# Patient Record
Sex: FEMALE | Race: ASIAN | NOT HISPANIC OR LATINO | Employment: OTHER | URBAN - METROPOLITAN AREA
[De-identification: names, ages, dates, MRNs, and addresses within clinical notes are randomized per-mention and may not be internally consistent; named-entity substitution may affect disease eponyms.]

---

## 2024-03-02 ENCOUNTER — HOSPITAL ENCOUNTER (EMERGENCY)
Facility: CLINIC | Age: 29
Discharge: HOME | End: 2024-03-02
Attending: EMERGENCY MEDICINE | Admitting: EMERGENCY MEDICINE
Payer: COMMERCIAL

## 2024-03-02 VITALS
HEART RATE: 95 BPM | BODY MASS INDEX: 26.45 KG/M2 | RESPIRATION RATE: 16 BRPM | TEMPERATURE: 97.7 F | SYSTOLIC BLOOD PRESSURE: 101 MMHG | DIASTOLIC BLOOD PRESSURE: 53 MMHG | WEIGHT: 158.73 LBS | HEIGHT: 65 IN | OXYGEN SATURATION: 98 %

## 2024-03-02 DIAGNOSIS — R11.2 NAUSEA AND VOMITING, UNSPECIFIED VOMITING TYPE: ICD-10-CM

## 2024-03-02 DIAGNOSIS — R10.13 ABDOMINAL PAIN, EPIGASTRIC: ICD-10-CM

## 2024-03-02 LAB
ALBUMIN SERPL BCG-MCNC: 4.4 G/DL (ref 3.5–5.2)
ALBUMIN UR-MCNC: NEGATIVE MG/DL
ALP SERPL-CCNC: 71 U/L (ref 40–150)
ALT SERPL W P-5'-P-CCNC: 21 U/L (ref 0–50)
ANION GAP SERPL CALCULATED.3IONS-SCNC: 11 MMOL/L (ref 7–15)
APPEARANCE UR: ABNORMAL
AST SERPL W P-5'-P-CCNC: 23 U/L (ref 0–45)
BACTERIA #/AREA URNS HPF: ABNORMAL /HPF
BASOPHILS # BLD AUTO: 0 10E3/UL (ref 0–0.2)
BASOPHILS NFR BLD AUTO: 0 %
BILIRUB SERPL-MCNC: <0.2 MG/DL
BILIRUB UR QL STRIP: NEGATIVE
BUN SERPL-MCNC: 16.5 MG/DL (ref 6–20)
CALCIUM SERPL-MCNC: 9.5 MG/DL (ref 8.6–10)
CHLORIDE SERPL-SCNC: 101 MMOL/L (ref 98–107)
COLOR UR AUTO: ABNORMAL
CREAT SERPL-MCNC: 0.83 MG/DL (ref 0.51–0.95)
DEPRECATED HCO3 PLAS-SCNC: 25 MMOL/L (ref 22–29)
EGFRCR SERPLBLD CKD-EPI 2021: >90 ML/MIN/1.73M2
EOSINOPHIL # BLD AUTO: 0.1 10E3/UL (ref 0–0.7)
EOSINOPHIL NFR BLD AUTO: 1 %
ERYTHROCYTE [DISTWIDTH] IN BLOOD BY AUTOMATED COUNT: 13.3 % (ref 10–15)
GLUCOSE SERPL-MCNC: 88 MG/DL (ref 70–99)
GLUCOSE UR STRIP-MCNC: NEGATIVE MG/DL
HCG INTACT+B SERPL-ACNC: <1 MIU/ML
HCT VFR BLD AUTO: 38.6 % (ref 35–47)
HGB BLD-MCNC: 12.3 G/DL (ref 11.7–15.7)
HGB UR QL STRIP: NEGATIVE
IMM GRANULOCYTES # BLD: 0 10E3/UL
IMM GRANULOCYTES NFR BLD: 0 %
KETONES UR STRIP-MCNC: NEGATIVE MG/DL
LEUKOCYTE ESTERASE UR QL STRIP: ABNORMAL
LIPASE SERPL-CCNC: 37 U/L (ref 13–60)
LYMPHOCYTES # BLD AUTO: 3.4 10E3/UL (ref 0.8–5.3)
LYMPHOCYTES NFR BLD AUTO: 38 %
MCH RBC QN AUTO: 27.7 PG (ref 26.5–33)
MCHC RBC AUTO-ENTMCNC: 31.9 G/DL (ref 31.5–36.5)
MCV RBC AUTO: 87 FL (ref 78–100)
MONOCYTES # BLD AUTO: 0.4 10E3/UL (ref 0–1.3)
MONOCYTES NFR BLD AUTO: 5 %
MUCOUS THREADS #/AREA URNS LPF: PRESENT /LPF
NEUTROPHILS # BLD AUTO: 5 10E3/UL (ref 1.6–8.3)
NEUTROPHILS NFR BLD AUTO: 56 %
NITRATE UR QL: NEGATIVE
NRBC # BLD AUTO: 0 10E3/UL
NRBC BLD AUTO-RTO: 0 /100
PH UR STRIP: 7 [PH] (ref 5–7)
PLATELET # BLD AUTO: 248 10E3/UL (ref 150–450)
POTASSIUM SERPL-SCNC: 3.5 MMOL/L (ref 3.4–5.3)
PROT SERPL-MCNC: 7.7 G/DL (ref 6.4–8.3)
RBC # BLD AUTO: 4.44 10E6/UL (ref 3.8–5.2)
RBC URINE: 1 /HPF
SODIUM SERPL-SCNC: 137 MMOL/L (ref 135–145)
SP GR UR STRIP: 1.02 (ref 1–1.03)
SQUAMOUS EPITHELIAL: 10 /HPF
UROBILINOGEN UR STRIP-MCNC: NORMAL MG/DL
WBC # BLD AUTO: 8.9 10E3/UL (ref 4–11)
WBC URINE: 18 /HPF

## 2024-03-02 PROCEDURE — 84702 CHORIONIC GONADOTROPIN TEST: CPT | Performed by: EMERGENCY MEDICINE

## 2024-03-02 PROCEDURE — 36415 COLL VENOUS BLD VENIPUNCTURE: CPT | Performed by: EMERGENCY MEDICINE

## 2024-03-02 PROCEDURE — 81001 URINALYSIS AUTO W/SCOPE: CPT | Performed by: EMERGENCY MEDICINE

## 2024-03-02 PROCEDURE — 85025 COMPLETE CBC W/AUTO DIFF WBC: CPT | Performed by: EMERGENCY MEDICINE

## 2024-03-02 PROCEDURE — 83690 ASSAY OF LIPASE: CPT | Performed by: EMERGENCY MEDICINE

## 2024-03-02 PROCEDURE — 96374 THER/PROPH/DIAG INJ IV PUSH: CPT | Performed by: EMERGENCY MEDICINE

## 2024-03-02 PROCEDURE — 250N000011 HC RX IP 250 OP 636: Performed by: EMERGENCY MEDICINE

## 2024-03-02 PROCEDURE — 99284 EMERGENCY DEPT VISIT MOD MDM: CPT | Mod: 25 | Performed by: EMERGENCY MEDICINE

## 2024-03-02 PROCEDURE — 258N000003 HC RX IP 258 OP 636: Performed by: EMERGENCY MEDICINE

## 2024-03-02 PROCEDURE — 96375 TX/PRO/DX INJ NEW DRUG ADDON: CPT | Performed by: EMERGENCY MEDICINE

## 2024-03-02 PROCEDURE — 99284 EMERGENCY DEPT VISIT MOD MDM: CPT | Performed by: EMERGENCY MEDICINE

## 2024-03-02 PROCEDURE — 80053 COMPREHEN METABOLIC PANEL: CPT | Performed by: EMERGENCY MEDICINE

## 2024-03-02 PROCEDURE — 96361 HYDRATE IV INFUSION ADD-ON: CPT | Performed by: EMERGENCY MEDICINE

## 2024-03-02 PROCEDURE — 87086 URINE CULTURE/COLONY COUNT: CPT | Performed by: EMERGENCY MEDICINE

## 2024-03-02 RX ORDER — ESCITALOPRAM OXALATE 20 MG/1
20 TABLET ORAL DAILY
COMMUNITY
End: 2024-04-04

## 2024-03-02 RX ORDER — KETOROLAC TROMETHAMINE 30 MG/ML
30 INJECTION, SOLUTION INTRAMUSCULAR; INTRAVENOUS ONCE
Status: COMPLETED | OUTPATIENT
Start: 2024-03-02 | End: 2024-03-02

## 2024-03-02 RX ORDER — LEVOTHYROXINE SODIUM 75 UG/1
75 TABLET ORAL DAILY
COMMUNITY
End: 2024-05-24

## 2024-03-02 RX ORDER — ONDANSETRON 2 MG/ML
4 INJECTION INTRAMUSCULAR; INTRAVENOUS ONCE
Status: COMPLETED | OUTPATIENT
Start: 2024-03-02 | End: 2024-03-02

## 2024-03-02 RX ORDER — METOCLOPRAMIDE HYDROCHLORIDE 5 MG/ML
5 INJECTION INTRAMUSCULAR; INTRAVENOUS ONCE
Status: COMPLETED | OUTPATIENT
Start: 2024-03-02 | End: 2024-03-02

## 2024-03-02 RX ORDER — ONDANSETRON 4 MG/1
4 TABLET, ORALLY DISINTEGRATING ORAL EVERY 8 HOURS PRN
Qty: 10 TABLET | Refills: 0 | Status: SHIPPED | OUTPATIENT
Start: 2024-03-02 | End: 2024-03-05

## 2024-03-02 RX ORDER — LACTOBACILLUS RHAMNOSUS GG 10B CELL
1 CAPSULE ORAL 2 TIMES DAILY
COMMUNITY
End: 2024-04-04

## 2024-03-02 RX ORDER — ACETAMINOPHEN 325 MG/1
325-650 TABLET ORAL EVERY 6 HOURS PRN
COMMUNITY

## 2024-03-02 RX ADMIN — SODIUM CHLORIDE 1000 ML: 9 INJECTION, SOLUTION INTRAVENOUS at 03:02

## 2024-03-02 RX ADMIN — ONDANSETRON 4 MG: 2 INJECTION INTRAMUSCULAR; INTRAVENOUS at 03:02

## 2024-03-02 RX ADMIN — KETOROLAC TROMETHAMINE 30 MG: 30 INJECTION, SOLUTION INTRAMUSCULAR at 04:50

## 2024-03-02 RX ADMIN — METOCLOPRAMIDE HYDROCHLORIDE 5 MG: 5 INJECTION INTRAMUSCULAR; INTRAVENOUS at 04:43

## 2024-03-02 ASSESSMENT — COLUMBIA-SUICIDE SEVERITY RATING SCALE - C-SSRS
1. IN THE PAST MONTH, HAVE YOU WISHED YOU WERE DEAD OR WISHED YOU COULD GO TO SLEEP AND NOT WAKE UP?: NO
2. HAVE YOU ACTUALLY HAD ANY THOUGHTS OF KILLING YOURSELF IN THE PAST MONTH?: NO
6. HAVE YOU EVER DONE ANYTHING, STARTED TO DO ANYTHING, OR PREPARED TO DO ANYTHING TO END YOUR LIFE?: NO

## 2024-03-02 ASSESSMENT — ACTIVITIES OF DAILY LIVING (ADL)
ADLS_ACUITY_SCORE: 35

## 2024-03-02 NOTE — ED PROVIDER NOTES
"ED Provider Note  [facility]      History     Chief Complaint   Patient presents with    Abdominal Pain    Nausea & Vomiting     HPI  Cathy Mace is a 28 year old female who presents to Emergency Department with a 1 day history of epigastric abdominal pain with associated nausea and vomiting.  Patient states that the pain is in the epigastrium and is nonradiating.  She states that it is sharp and has been worsening.  She has been vomiting gastric contents.  She had a normal bowel movement yesterday morning.  She denies any fever.  No chest pain or dyspnea.  No history of dyspepsia.  Patient denies any dysuria, urgency, or frequency.  No back pain.  Patient reports a history of an appendectomy last year.  Denies abdominal bloating.    Past Medical History  History reviewed. No pertinent past medical history.  History reviewed. No pertinent surgical history.  acetaminophen (TYLENOL) 325 MG tablet  escitalopram (LEXAPRO) 20 MG tablet  lactobacillus rhamnosus, GG, (CULTURELL) capsule  levothyroxine (SYNTHROID/LEVOTHROID) 75 MCG tablet  ondansetron (ZOFRAN ODT) 4 MG ODT tab      No Known Allergies  Family History  History reviewed. No pertinent family history.  Social History   Social History     Tobacco Use    Smoking status: Never    Smokeless tobacco: Never   Substance Use Topics    Alcohol use: Never    Drug use: Never         A medically appropriate review of systems was performed with pertinent positives and negatives noted in the HPI, and all other systems negative.    Physical Exam   BP: 118/83  Pulse: 95  Temp: 97.5  F (36.4  C)  Resp: 16  Height: 165.1 cm (5' 5\")  Weight: 72 kg (158 lb 11.7 oz)  SpO2: 98 %  Physical Exam  Vitals and nursing note reviewed.   Constitutional:       General: She is in acute distress.      Appearance: She is well-developed.   HENT:      Head: Normocephalic.   Cardiovascular:      Rate and Rhythm: Normal rate and regular rhythm.   Pulmonary:      Effort: " Pulmonary effort is normal.      Breath sounds: Normal breath sounds.   Abdominal:      General: Abdomen is flat.      Palpations: Abdomen is soft.      Tenderness: There is abdominal tenderness in the epigastric area.   Skin:     General: Skin is warm and dry.   Neurological:      General: No focal deficit present.      Mental Status: She is alert.           ED Course, Procedures, & Data      Procedures               Results for orders placed or performed during the hospital encounter of 03/02/24   Comprehensive metabolic panel     Status: Normal   Result Value Ref Range    Sodium 137 135 - 145 mmol/L    Potassium 3.5 3.4 - 5.3 mmol/L    Carbon Dioxide (CO2) 25 22 - 29 mmol/L    Anion Gap 11 7 - 15 mmol/L    Urea Nitrogen 16.5 6.0 - 20.0 mg/dL    Creatinine 0.83 0.51 - 0.95 mg/dL    GFR Estimate >90 >60 mL/min/1.73m2    Calcium 9.5 8.6 - 10.0 mg/dL    Chloride 101 98 - 107 mmol/L    Glucose 88 70 - 99 mg/dL    Alkaline Phosphatase 71 40 - 150 U/L    AST 23 0 - 45 U/L    ALT 21 0 - 50 U/L    Protein Total 7.7 6.4 - 8.3 g/dL    Albumin 4.4 3.5 - 5.2 g/dL    Bilirubin Total <0.2 <=1.2 mg/dL   Lipase     Status: Normal   Result Value Ref Range    Lipase 37 13 - 60 U/L   HCG quantitative pregnancy     Status: Normal   Result Value Ref Range    hCG Quantitative <1 <5 mIU/mL   UA with Microscopic reflex to Culture     Status: Abnormal    Specimen: Urine, Midstream   Result Value Ref Range    Color Urine Light Yellow Colorless, Straw, Light Yellow, Yellow    Appearance Urine Slightly Cloudy (A) Clear    Glucose Urine Negative Negative mg/dL    Bilirubin Urine Negative Negative    Ketones Urine Negative Negative mg/dL    Specific Gravity Urine 1.021 1.003 - 1.035    Blood Urine Negative Negative    pH Urine 7.0 5.0 - 7.0    Protein Albumin Urine Negative Negative mg/dL    Urobilinogen Urine Normal Normal, 2.0 mg/dL    Nitrite Urine Negative Negative    Leukocyte Esterase Urine Large (A) Negative    Bacteria Urine Few (A)  None Seen /HPF    Mucus Urine Present (A) None Seen /LPF    RBC Urine 1 <=2 /HPF    WBC Urine 18 (H) <=5 /HPF    Squamous Epithelials Urine 10 (H) <=1 /HPF    Narrative    Urine Culture ordered based on laboratory criteria   CBC with platelets and differential     Status: None   Result Value Ref Range    WBC Count 8.9 4.0 - 11.0 10e3/uL    RBC Count 4.44 3.80 - 5.20 10e6/uL    Hemoglobin 12.3 11.7 - 15.7 g/dL    Hematocrit 38.6 35.0 - 47.0 %    MCV 87 78 - 100 fL    MCH 27.7 26.5 - 33.0 pg    MCHC 31.9 31.5 - 36.5 g/dL    RDW 13.3 10.0 - 15.0 %    Platelet Count 248 150 - 450 10e3/uL    % Neutrophils 56 %    % Lymphocytes 38 %    % Monocytes 5 %    % Eosinophils 1 %    % Basophils 0 %    % Immature Granulocytes 0 %    NRBCs per 100 WBC 0 <1 /100    Absolute Neutrophils 5.0 1.6 - 8.3 10e3/uL    Absolute Lymphocytes 3.4 0.8 - 5.3 10e3/uL    Absolute Monocytes 0.4 0.0 - 1.3 10e3/uL    Absolute Eosinophils 0.1 0.0 - 0.7 10e3/uL    Absolute Basophils 0.0 0.0 - 0.2 10e3/uL    Absolute Immature Granulocytes 0.0 <=0.4 10e3/uL    Absolute NRBCs 0.0 10e3/uL   CBC with platelets differential     Status: None    Narrative    The following orders were created for panel order CBC with platelets differential.  Procedure                               Abnormality         Status                     ---------                               -----------         ------                     CBC with platelets and d...[[id number]]                      Final result                 Please view results for these tests on the individual orders.     Medications   sodium chloride 0.9% BOLUS 1,000 mL (0 mLs Intravenous Stopped 3/2/24 6343)   ondansetron (ZOFRAN) injection 4 mg (4 mg Intravenous $Given 3/2/24 2127)   metoclopramide (REGLAN) injection 5 mg (5 mg Intravenous $Given 3/2/24 4056)   ketorolac (TORADOL) injection 30 mg (30 mg Intravenous $Given 3/2/24 0450)     Labs Ordered and Resulted from Time of ED Arrival to Time of ED Departure    ROUTINE UA WITH MICROSCOPIC REFLEX TO CULTURE - Abnormal       Result Value    Color Urine Light Yellow      Appearance Urine Slightly Cloudy (*)     Glucose Urine Negative      Bilirubin Urine Negative      Ketones Urine Negative      Specific Gravity Urine 1.021      Blood Urine Negative      pH Urine 7.0      Protein Albumin Urine Negative      Urobilinogen Urine Normal      Nitrite Urine Negative      Leukocyte Esterase Urine Large (*)     Bacteria Urine Few (*)     Mucus Urine Present (*)     RBC Urine 1      WBC Urine 18 (*)     Squamous Epithelials Urine 10 (*)    COMPREHENSIVE METABOLIC PANEL - Normal    Sodium 137      Potassium 3.5      Carbon Dioxide (CO2) 25      Anion Gap 11      Urea Nitrogen 16.5      Creatinine 0.83      GFR Estimate >90      Calcium 9.5      Chloride 101      Glucose 88      Alkaline Phosphatase 71      AST 23      ALT 21      Protein Total 7.7      Albumin 4.4      Bilirubin Total <0.2     LIPASE - Normal    Lipase 37     HCG QUANTITATIVE PREGNANCY - Normal    hCG Quantitative <1     CBC WITH PLATELETS AND DIFFERENTIAL    WBC Count 8.9      RBC Count 4.44      Hemoglobin 12.3      Hematocrit 38.6      MCV 87      MCH 27.7      MCHC 31.9      RDW 13.3      Platelet Count 248      % Neutrophils 56      % Lymphocytes 38      % Monocytes 5      % Eosinophils 1      % Basophils 0      % Immature Granulocytes 0      NRBCs per 100 WBC 0      Absolute Neutrophils 5.0      Absolute Lymphocytes 3.4      Absolute Monocytes 0.4      Absolute Eosinophils 0.1      Absolute Basophils 0.0      Absolute Immature Granulocytes 0.0      Absolute NRBCs 0.0     URINE CULTURE     No orders to display      5:19 AM feeling better.  No ongoing abdominal pain nor nausea.  Abdomen soft and nontender.    Critical care was not performed.     Medical Decision Making  The patient's presentation was of moderate complexity (an undiagnosed new problem with uncertain diagnosis).    The patient's evaluation  involved:  ordering and/or review of 3+ test(s) in this encounter (see separate area of note for details)    The patient's management necessitated moderate risk (prescription drug management including medications given in the ED).    Assessment & Plan    28 year old female treatment department with epigastric abdominal pain associated nausea and vomiting.  Differential diagnosis includes gastritis, enteritis, gastroenteritis, acute liver pathology including biliary obstruction and hepatitis, acute pancreatitis, bowel obstruction, acute cholecystitis, perforated viscus.  Diagnostic evaluation includes normal labs including CBC, comprehensive metabolic panel, and lipase.  Do not suspect acute liver pathology, acute cholecystitis, or acute pancreatitis based on normal labs and no right upper quadrant abdominal tenderness.  Patient's urine has pyuria but she does not have any UTI symptoms.  She has resolved abdominal pain and nausea on recheck after above medications.  With no focal abdominal tenderness, do not suspect acute intra-abdominal pathology including bowel obstruction, perforated viscus, or acute appendicitis.  Patient is now asymptomatic.  She appears clinically well and appropriate for outpatient management.  She will be discharged home ondansetron.  Strict return precautions provided.  Primary care follow-up next week if ongoing symptoms.    I have reviewed the nursing notes. I have reviewed the findings, diagnosis, plan and need for follow up with the patient.    New Prescriptions    ONDANSETRON (ZOFRAN ODT) 4 MG ODT TAB    Take 1 tablet (4 mg) by mouth every 8 hours as needed       Final diagnoses:   Abdominal pain, epigastric   Nausea and vomiting, unspecified vomiting type     Chart documentation was completed with Dragon voice-recognition software. Even though reviewed, this chart may still contain some grammatical, spelling, and word errors.     Phillip Naik Md    [facility] EMERGENCY  DEPARTMENT  3/2/2024     Phillip Naik MD  03/02/24 0580

## 2024-03-02 NOTE — DISCHARGE INSTRUCTIONS
Take ondansetron as needed for nausea.  Clear liquid diet-advance as tolerated.    Return immediately if you develop persistent pain in the right lower portion of the abdomen or the right upper portion of the abdomen.  Also return if fever, vomiting not controlled by ondansetron, worse, or other concerns.    Follow-up with your primary care clinic next week as needed if not improving.

## 2024-03-02 NOTE — ED TRIAGE NOTES
Pt. states started with epigastric pain around 0900 yesterday morning.  Now pain worse with nausea and vomiting.  Pt. states took antiemetic and tylenol at home.   Triage Assessment (Adult)       Row Name 03/02/24 0215          Triage Assessment    Airway WDL WDL        Respiratory WDL    Respiratory WDL WDL        Skin Circulation/Temperature WDL    Skin Circulation/Temperature WDL WDL        Cardiac WDL    Cardiac WDL WDL        Peripheral/Neurovascular WDL    Peripheral Neurovascular WDL WDL        Cognitive/Neuro/Behavioral WDL    Cognitive/Neuro/Behavioral WDL WDL

## 2024-03-03 LAB — BACTERIA UR CULT: NO GROWTH

## 2024-03-10 ENCOUNTER — HOSPITAL ENCOUNTER (EMERGENCY)
Facility: CLINIC | Age: 29
Discharge: HOME | End: 2024-03-10
Attending: EMERGENCY MEDICINE | Admitting: EMERGENCY MEDICINE
Payer: COMMERCIAL

## 2024-03-10 VITALS
HEIGHT: 65 IN | BODY MASS INDEX: 27.66 KG/M2 | RESPIRATION RATE: 18 BRPM | OXYGEN SATURATION: 99 % | TEMPERATURE: 97.7 F | WEIGHT: 166 LBS | SYSTOLIC BLOOD PRESSURE: 103 MMHG | DIASTOLIC BLOOD PRESSURE: 66 MMHG | HEART RATE: 93 BPM

## 2024-03-10 DIAGNOSIS — K60.2 RECTAL FISSURE: ICD-10-CM

## 2024-03-10 DIAGNOSIS — K62.5 RECTAL BLEEDING: ICD-10-CM

## 2024-03-10 LAB
BASOPHILS # BLD AUTO: 0 10E3/UL (ref 0–0.2)
BASOPHILS NFR BLD AUTO: 0 %
EOSINOPHIL # BLD AUTO: 0.1 10E3/UL (ref 0–0.7)
EOSINOPHIL NFR BLD AUTO: 1 %
ERYTHROCYTE [DISTWIDTH] IN BLOOD BY AUTOMATED COUNT: 13.3 % (ref 10–15)
HCT VFR BLD AUTO: 37.4 % (ref 35–47)
HGB BLD-MCNC: 12.3 G/DL (ref 11.7–15.7)
IMM GRANULOCYTES # BLD: 0 10E3/UL
IMM GRANULOCYTES NFR BLD: 0 %
LYMPHOCYTES # BLD AUTO: 3.4 10E3/UL (ref 0.8–5.3)
LYMPHOCYTES NFR BLD AUTO: 38 %
MCH RBC QN AUTO: 27.5 PG (ref 26.5–33)
MCHC RBC AUTO-ENTMCNC: 32.9 G/DL (ref 31.5–36.5)
MCV RBC AUTO: 84 FL (ref 78–100)
MONOCYTES # BLD AUTO: 0.4 10E3/UL (ref 0–1.3)
MONOCYTES NFR BLD AUTO: 5 %
NEUTROPHILS # BLD AUTO: 4.9 10E3/UL (ref 1.6–8.3)
NEUTROPHILS NFR BLD AUTO: 56 %
NRBC # BLD AUTO: 0 10E3/UL
NRBC BLD AUTO-RTO: 0 /100
PLATELET # BLD AUTO: 222 10E3/UL (ref 150–450)
RBC # BLD AUTO: 4.48 10E6/UL (ref 3.8–5.2)
WBC # BLD AUTO: 8.9 10E3/UL (ref 4–11)

## 2024-03-10 PROCEDURE — 99283 EMERGENCY DEPT VISIT LOW MDM: CPT | Performed by: EMERGENCY MEDICINE

## 2024-03-10 PROCEDURE — 85025 COMPLETE CBC W/AUTO DIFF WBC: CPT | Performed by: EMERGENCY MEDICINE

## 2024-03-10 PROCEDURE — 99284 EMERGENCY DEPT VISIT MOD MDM: CPT | Performed by: EMERGENCY MEDICINE

## 2024-03-10 PROCEDURE — 36415 COLL VENOUS BLD VENIPUNCTURE: CPT | Performed by: EMERGENCY MEDICINE

## 2024-03-10 RX ORDER — POLYETHYLENE GLYCOL 3350 17 G/17G
1 POWDER, FOR SOLUTION ORAL DAILY PRN
Qty: 527 G | Refills: 0 | Status: SHIPPED | OUTPATIENT
Start: 2024-03-10 | End: 2024-04-09

## 2024-03-10 RX ORDER — OXYCODONE HYDROCHLORIDE 5 MG/1
5 TABLET ORAL EVERY 6 HOURS PRN
Qty: 10 TABLET | Refills: 0 | Status: SHIPPED | OUTPATIENT
Start: 2024-03-10 | End: 2024-04-04

## 2024-03-10 ASSESSMENT — COLUMBIA-SUICIDE SEVERITY RATING SCALE - C-SSRS
2. HAVE YOU ACTUALLY HAD ANY THOUGHTS OF KILLING YOURSELF IN THE PAST MONTH?: NO
6. HAVE YOU EVER DONE ANYTHING, STARTED TO DO ANYTHING, OR PREPARED TO DO ANYTHING TO END YOUR LIFE?: NO
1. IN THE PAST MONTH, HAVE YOU WISHED YOU WERE DEAD OR WISHED YOU COULD GO TO SLEEP AND NOT WAKE UP?: NO

## 2024-03-10 ASSESSMENT — ACTIVITIES OF DAILY LIVING (ADL)
ADLS_ACUITY_SCORE: 35

## 2024-03-10 NOTE — ED TRIAGE NOTES
Patient states to have had rectal bleeding for the last eight days that is bright red, she states it is getting painful and she is feeling weak and nauseas. States to have a fissure.      Triage Assessment (Adult)       Row Name 03/10/24 1822          Triage Assessment    Airway WDL WDL        Respiratory WDL    Respiratory WDL WDL        Skin Circulation/Temperature WDL    Skin Circulation/Temperature WDL WDL        Cardiac WDL    Cardiac WDL WDL        Peripheral/Neurovascular WDL    Peripheral Neurovascular WDL WDL        Cognitive/Neuro/Behavioral WDL    Cognitive/Neuro/Behavioral WDL WDL

## 2024-03-11 NOTE — ED PROVIDER NOTES
ED Provider Note  [facility]      History     Chief Complaint   Patient presents with    Rectal Bleeding     Patient states to have had rectal bleeding for the last eight days that is bright red, she states it is getting painful and she is feeling weak and nauseas. States to have a fissure.      The history is provided by the patient and medical records.     Cathy Mace is a 28-year-old female who presents to the emergency department today complaining of rectal bleeding.  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  She reports she has had a workup for this in [place] and even had a colonoscopy in 2021.  Patient states that for the past 8 days she has had increasing pain with passing stool, and is noticing bright red blood that is on top of the stool as well as on the toilet paper when she wipes.  As far as she knows she has never had a hemorrhoid in the past.  Prior to this starting about 8 days ago she did have a bout of constipation and was straining to pass a bowel movement.  Since then her stool has become softer, but she still noticing blood.  Sometimes the blood does drip in the toilet after she is finished passing stool.  Patient is currently taking Metamucil as well as a stool softener for this.  She is denying any other bleeding, denies epistaxis, gingival bleeding, or hematuria.  Patient denies any fevers or chills, nasal congestion or sore throat, no cough, shortness of breath, or chest pain.  No upper abdominal pain, has had some lower abdominal pain at times which is sort of cramping.  No diarrhea, no urinary symptoms.  She has had an appendectomy in the past, but otherwise denies previous abdominal surgeries.    Patient recently moved here in February and does not yet have a clinic.     This part of the medical record was transcribed by Sharad Worthington, Medical Scribe, from a dictation done by Shea  "MD Day.     Past Medical History  No past medical history on file.  No past surgical history on file.  oxyCODONE (ROXICODONE) 5 MG tablet  polyethylene glycol (MIRALAX) 17 GM/Dose powder  acetaminophen (TYLENOL) 325 MG tablet  escitalopram (LEXAPRO) 20 MG tablet  lactobacillus rhamnosus, GG, (CULTURELL) capsule  levothyroxine (SYNTHROID/LEVOTHROID) 75 MCG tablet      No Known Allergies  Family History  No family history on file.  Social History   Social History     Tobacco Use    Smoking status: Never    Smokeless tobacco: Never   Substance Use Topics    Alcohol use: Never    Drug use: Never         A medically appropriate review of systems was performed with pertinent positives and negatives noted in the HPI, and all other systems negative.    Physical Exam   BP: 117/79  Pulse: 97  Temp: 97.7  F (36.5  C)  Resp: 18  Height: 165.1 cm (5' 5\")  Weight: 75.3 kg (166 lb)  SpO2: 99 %  Physical Exam  Vitals and nursing note reviewed.   Constitutional:       General: She is not in acute distress.     Appearance: She is not diaphoretic.      Comments: Adult female, alert, cooperative, no acute distress   HENT:      Head: Atraumatic.      Mouth/Throat:      Mouth: Mucous membranes are moist.      Pharynx: Oropharynx is clear. No oropharyngeal exudate.   Eyes:      General: No scleral icterus.     Pupils: Pupils are equal, round, and reactive to light.   Cardiovascular:      Rate and Rhythm: Normal rate.      Pulses: Normal pulses.      Heart sounds: Normal heart sounds. No murmur heard.  Pulmonary:      Effort: No respiratory distress.      Breath sounds: Normal breath sounds.   Abdominal:      General: Bowel sounds are normal.      Palpations: Abdomen is soft.      Tenderness: There is no abdominal tenderness.   Genitourinary:     Comments: Anal skin tag noted, no sign of hemorrhoids. Digital rectal exam was very painful for the patient. No blood on gloved finger, no stool in vault, guaiac negative. No masses. Good " rectal tone  Musculoskeletal:         General: No tenderness.   Skin:     General: Skin is warm.      Findings: No rash.   Neurological:      Mental Status: She is alert.         ED Course, Procedures, & Data      Procedures            Results for orders placed or performed during the hospital encounter of 03/10/24   CBC with platelets and differential     Status: None   Result Value Ref Range    WBC Count 8.9 4.0 - 11.0 10e3/uL    RBC Count 4.48 3.80 - 5.20 10e6/uL    Hemoglobin 12.3 11.7 - 15.7 g/dL    Hematocrit 37.4 35.0 - 47.0 %    MCV 84 78 - 100 fL    MCH 27.5 26.5 - 33.0 pg    MCHC 32.9 31.5 - 36.5 g/dL    RDW 13.3 10.0 - 15.0 %    Platelet Count 222 150 - 450 10e3/uL    % Neutrophils 56 %    % Lymphocytes 38 %    % Monocytes 5 %    % Eosinophils 1 %    % Basophils 0 %    % Immature Granulocytes 0 %    NRBCs per 100 WBC 0 <1 /100    Absolute Neutrophils 4.9 1.6 - 8.3 10e3/uL    Absolute Lymphocytes 3.4 0.8 - 5.3 10e3/uL    Absolute Monocytes 0.4 0.0 - 1.3 10e3/uL    Absolute Eosinophils 0.1 0.0 - 0.7 10e3/uL    Absolute Basophils 0.0 0.0 - 0.2 10e3/uL    Absolute Immature Granulocytes 0.0 <=0.4 10e3/uL    Absolute NRBCs 0.0 10e3/uL   CBC with Platelets & Differential     Status: None    Narrative    The following orders were created for panel order CBC with Platelets & Differential.  Procedure                               Abnormality         Status                     ---------                               -----------         ------                     CBC with platelets and d...[[id number]]                      Final result                 Please view results for these tests on the individual orders.     Medications - No data to display  Labs Ordered and Resulted from Time of ED Arrival to Time of ED Departure   CBC WITH PLATELETS AND DIFFERENTIAL       Result Value    WBC Count 8.9      RBC Count 4.48      Hemoglobin 12.3      Hematocrit 37.4      MCV 84      MCH 27.5      MCHC 32.9      RDW 13.3       Platelet Count 222      % Neutrophils 56      % Lymphocytes 38      % Monocytes 5      % Eosinophils 1      % Basophils 0      % Immature Granulocytes 0      NRBCs per 100 WBC 0      Absolute Neutrophils 4.9      Absolute Lymphocytes 3.4      Absolute Monocytes 0.4      Absolute Eosinophils 0.1      Absolute Basophils 0.0      Absolute Immature Granulocytes 0.0      Absolute NRBCs 0.0       No orders to display        Results for orders placed or performed during the hospital encounter of 03/10/24   CBC with platelets and differential     Status: None   Result Value Ref Range    WBC Count 8.9 4.0 - 11.0 10e3/uL    RBC Count 4.48 3.80 - 5.20 10e6/uL    Hemoglobin 12.3 11.7 - 15.7 g/dL    Hematocrit 37.4 35.0 - 47.0 %    MCV 84 78 - 100 fL    MCH 27.5 26.5 - 33.0 pg    MCHC 32.9 31.5 - 36.5 g/dL    RDW 13.3 10.0 - 15.0 %    Platelet Count 222 150 - 450 10e3/uL    % Neutrophils 56 %    % Lymphocytes 38 %    % Monocytes 5 %    % Eosinophils 1 %    % Basophils 0 %    % Immature Granulocytes 0 %    NRBCs per 100 WBC 0 <1 /100    Absolute Neutrophils 4.9 1.6 - 8.3 10e3/uL    Absolute Lymphocytes 3.4 0.8 - 5.3 10e3/uL    Absolute Monocytes 0.4 0.0 - 1.3 10e3/uL    Absolute Eosinophils 0.1 0.0 - 0.7 10e3/uL    Absolute Basophils 0.0 0.0 - 0.2 10e3/uL    Absolute Immature Granulocytes 0.0 <=0.4 10e3/uL    Absolute NRBCs 0.0 10e3/uL   CBC with Platelets & Differential     Status: None    Narrative    The following orders were created for panel order CBC with Platelets & Differential.  Procedure                               Abnormality         Status                     ---------                               -----------         ------                     CBC with platelets and d...[[id number]]                      Final result                 Please view results for these tests on the individual orders.       Critical care was not performed.     Medical Decision Making  The patient's presentation was of moderate complexity (a  chronic illness mild to moderate exacerbation, progression, or side effect of treatment).    The patient's evaluation involved:  ordering and/or review of 1 test(s) in this encounter (see separate area of note for details)    The patient's management necessitated moderate risk (prescription drug management including medications given in the ED).    Assessment & Plan    Patient presents for the above complaints.  On my evaluation she is alert, cooperative, no acute distress.  Due to the amount of bleeding she is reporting we did check basic labs, CBC shows normal hemoglobin at 12.3, normal platelets. Rectal exam demonstrates no evidence of hemorrhoids, and exam was exquisitely painful consistent with an anal fissure.    At this point will recommend that she continue to use Metamucil to keep her stools soft, and alternatively MiraLAX if she would like to do that instead.  We will recommend that she treat herself with Tylenol, though she was given a prescription for oxycodone to use for severe pain.  Typical precautions discussed, including the potential for sedation.  She should not drink alcohol or drive a vehicle while taking this medication.  Additionally, she was advised that this can certainly cause constipation which could exacerbate her rectal symptoms, so she was instructed to be very certain to take MiraLAX especially if she is taking the oxycodone.  Colorectal clinic referral was made.  Patient verbalizes understanding.    I have reviewed the nursing notes. I have reviewed the findings, diagnosis, plan and need for follow up with the patient.    New Prescriptions    OXYCODONE (ROXICODONE) 5 MG TABLET    Take 1 tablet (5 mg) by mouth every 6 hours as needed for pain    POLYETHYLENE GLYCOL (MIRALAX) 17 GM/DOSE POWDER    Take 17 g (1 Capful) by mouth daily as needed for constipation       Final diagnoses:   Rectal bleeding   Rectal fissure       Shea Bernstein MD  [facility] EMERGENCY  DEPARTMENT  3/10/2024     Shea Bernstein MD  03/10/24 1418

## 2024-03-11 NOTE — DISCHARGE INSTRUCTIONS
You have been seen in the emergency department today for rectal bleeding.  Your blood work today is normal with a normal hemoglobin.    We do suspect you likely have a rectal fissure as you have had this before.    We recommend that you follow-up with your colorectal surgery clinic, we have placed a referral in the computer for this.  You should expect to receive a phone call in the next 1-2 business days to schedule this.    It is very important to try to keep your stool as soft as possible.  We are giving you a prescription for MiraLAX.  This is a powder that can help keep your stool soft. For pain we recommend that you take tylenol. For severe pain you can use oxycodone sparingly, you should not drink or drive a vehicle while taking this as it can make you sedated.  This can also cause constipation, so only use this when absolutely necessary and be sure to take your MiraLAX every day to help with keeping her stool soft.

## 2024-03-12 NOTE — TELEPHONE ENCOUNTER
Diagnosis, Referred by & from: Rectal Fissure   Appt date: 5/22/2024   NOTES STATUS DETAILS   OFFICE NOTE from referring provider N/A    OFFICE NOTE from other specialist N/A * Prior Records in [place]   DISCHARGE SUMMARY from hospital N/A    DISCHARGE REPORT from the ER Internal [facility]:  3/10/24 - ED OV with Dr. Bernstein  3/2/24 - ED OV with Dr. Naik   OPERATIVE REPORT N/A    MEDICATION LIST Internal    LABS N/A    DIAGNOSTIC PROCEDURES     COLONOSCOPY (most recent all time after 5 years) N/A [place]:  2021   IMAGING (DISC & REPORT) N/A

## 2024-03-29 ENCOUNTER — HOSPITAL ENCOUNTER (EMERGENCY)
Facility: CLINIC | Age: 29
Discharge: HOME | End: 2024-03-29
Attending: STUDENT IN AN ORGANIZED HEALTH CARE EDUCATION/TRAINING PROGRAM | Admitting: STUDENT IN AN ORGANIZED HEALTH CARE EDUCATION/TRAINING PROGRAM
Payer: COMMERCIAL

## 2024-03-29 VITALS
SYSTOLIC BLOOD PRESSURE: 102 MMHG | OXYGEN SATURATION: 97 % | TEMPERATURE: 98.1 F | HEART RATE: 96 BPM | RESPIRATION RATE: 16 BRPM | DIASTOLIC BLOOD PRESSURE: 65 MMHG

## 2024-03-29 DIAGNOSIS — O21.9 NAUSEA AND VOMITING DURING PREGNANCY: ICD-10-CM

## 2024-03-29 LAB
ABO/RH(D): NORMAL
ALBUMIN SERPL BCG-MCNC: 5.2 G/DL (ref 3.5–5.2)
ALBUMIN UR-MCNC: NEGATIVE MG/DL
ALP SERPL-CCNC: 73 U/L (ref 40–150)
ALT SERPL W P-5'-P-CCNC: 18 U/L (ref 0–50)
ANION GAP SERPL CALCULATED.3IONS-SCNC: 11 MMOL/L (ref 7–15)
ANTIBODY SCREEN: NEGATIVE
APPEARANCE UR: CLEAR
AST SERPL W P-5'-P-CCNC: 24 U/L (ref 0–45)
BASOPHILS # BLD AUTO: 0 10E3/UL (ref 0–0.2)
BASOPHILS NFR BLD AUTO: 0 %
BILIRUB SERPL-MCNC: 0.2 MG/DL
BILIRUB UR QL STRIP: NEGATIVE
BUN SERPL-MCNC: 5.2 MG/DL (ref 6–20)
CALCIUM SERPL-MCNC: 10.1 MG/DL (ref 8.6–10)
CHLORIDE SERPL-SCNC: 100 MMOL/L (ref 98–107)
COLOR UR AUTO: ABNORMAL
CREAT SERPL-MCNC: 0.63 MG/DL (ref 0.51–0.95)
DEPRECATED HCO3 PLAS-SCNC: 25 MMOL/L (ref 22–29)
EGFRCR SERPLBLD CKD-EPI 2021: >90 ML/MIN/1.73M2
EOSINOPHIL # BLD AUTO: 0 10E3/UL (ref 0–0.7)
EOSINOPHIL NFR BLD AUTO: 1 %
ERYTHROCYTE [DISTWIDTH] IN BLOOD BY AUTOMATED COUNT: 14.2 % (ref 10–15)
GLUCOSE SERPL-MCNC: 72 MG/DL (ref 70–99)
GLUCOSE UR STRIP-MCNC: NEGATIVE MG/DL
HCG INTACT+B SERPL-ACNC: ABNORMAL MIU/ML
HCT VFR BLD AUTO: 43.3 % (ref 35–47)
HGB BLD-MCNC: 13.8 G/DL (ref 11.7–15.7)
HGB UR QL STRIP: NEGATIVE
IMM GRANULOCYTES # BLD: 0 10E3/UL
IMM GRANULOCYTES NFR BLD: 0 %
KETONES UR STRIP-MCNC: NEGATIVE MG/DL
LEUKOCYTE ESTERASE UR QL STRIP: NEGATIVE
LIPASE SERPL-CCNC: 35 U/L (ref 13–60)
LYMPHOCYTES # BLD AUTO: 3.2 10E3/UL (ref 0.8–5.3)
LYMPHOCYTES NFR BLD AUTO: 45 %
MAGNESIUM SERPL-MCNC: 2.2 MG/DL (ref 1.7–2.3)
MCH RBC QN AUTO: 27.4 PG (ref 26.5–33)
MCHC RBC AUTO-ENTMCNC: 31.9 G/DL (ref 31.5–36.5)
MCV RBC AUTO: 86 FL (ref 78–100)
MONOCYTES # BLD AUTO: 0.4 10E3/UL (ref 0–1.3)
MONOCYTES NFR BLD AUTO: 5 %
MUCOUS THREADS #/AREA URNS LPF: PRESENT /LPF
NEUTROPHILS # BLD AUTO: 3.5 10E3/UL (ref 1.6–8.3)
NEUTROPHILS NFR BLD AUTO: 49 %
NITRATE UR QL: NEGATIVE
NRBC # BLD AUTO: 0 10E3/UL
NRBC BLD AUTO-RTO: 0 /100
PH UR STRIP: 6.5 [PH] (ref 5–7)
PHOSPHATE SERPL-MCNC: 3.4 MG/DL (ref 2.5–4.5)
PLATELET # BLD AUTO: 256 10E3/UL (ref 150–450)
POTASSIUM SERPL-SCNC: 3.3 MMOL/L (ref 3.4–5.3)
PROT SERPL-MCNC: 9 G/DL (ref 6.4–8.3)
RBC # BLD AUTO: 5.03 10E6/UL (ref 3.8–5.2)
RBC URINE: 2 /HPF
SODIUM SERPL-SCNC: 136 MMOL/L (ref 135–145)
SP GR UR STRIP: 1.01 (ref 1–1.03)
SPECIMEN EXPIRATION DATE: NORMAL
SQUAMOUS EPITHELIAL: 8 /HPF
UROBILINOGEN UR STRIP-MCNC: NORMAL MG/DL
WBC # BLD AUTO: 7.2 10E3/UL (ref 4–11)
WBC URINE: 1 /HPF

## 2024-03-29 PROCEDURE — 83735 ASSAY OF MAGNESIUM: CPT | Performed by: STUDENT IN AN ORGANIZED HEALTH CARE EDUCATION/TRAINING PROGRAM

## 2024-03-29 PROCEDURE — 81001 URINALYSIS AUTO W/SCOPE: CPT | Performed by: STUDENT IN AN ORGANIZED HEALTH CARE EDUCATION/TRAINING PROGRAM

## 2024-03-29 PROCEDURE — 258N000003 HC RX IP 258 OP 636: Performed by: STUDENT IN AN ORGANIZED HEALTH CARE EDUCATION/TRAINING PROGRAM

## 2024-03-29 PROCEDURE — 93005 ELECTROCARDIOGRAM TRACING: CPT | Performed by: STUDENT IN AN ORGANIZED HEALTH CARE EDUCATION/TRAINING PROGRAM

## 2024-03-29 PROCEDURE — 80053 COMPREHEN METABOLIC PANEL: CPT | Performed by: STUDENT IN AN ORGANIZED HEALTH CARE EDUCATION/TRAINING PROGRAM

## 2024-03-29 PROCEDURE — 99284 EMERGENCY DEPT VISIT MOD MDM: CPT | Mod: 25 | Performed by: STUDENT IN AN ORGANIZED HEALTH CARE EDUCATION/TRAINING PROGRAM

## 2024-03-29 PROCEDURE — 83690 ASSAY OF LIPASE: CPT | Performed by: STUDENT IN AN ORGANIZED HEALTH CARE EDUCATION/TRAINING PROGRAM

## 2024-03-29 PROCEDURE — 96374 THER/PROPH/DIAG INJ IV PUSH: CPT | Performed by: STUDENT IN AN ORGANIZED HEALTH CARE EDUCATION/TRAINING PROGRAM

## 2024-03-29 PROCEDURE — 84100 ASSAY OF PHOSPHORUS: CPT | Performed by: STUDENT IN AN ORGANIZED HEALTH CARE EDUCATION/TRAINING PROGRAM

## 2024-03-29 PROCEDURE — 250N000011 HC RX IP 250 OP 636: Performed by: STUDENT IN AN ORGANIZED HEALTH CARE EDUCATION/TRAINING PROGRAM

## 2024-03-29 PROCEDURE — 36415 COLL VENOUS BLD VENIPUNCTURE: CPT | Performed by: STUDENT IN AN ORGANIZED HEALTH CARE EDUCATION/TRAINING PROGRAM

## 2024-03-29 PROCEDURE — 93010 ELECTROCARDIOGRAM REPORT: CPT | Performed by: STUDENT IN AN ORGANIZED HEALTH CARE EDUCATION/TRAINING PROGRAM

## 2024-03-29 PROCEDURE — 86900 BLOOD TYPING SEROLOGIC ABO: CPT | Performed by: STUDENT IN AN ORGANIZED HEALTH CARE EDUCATION/TRAINING PROGRAM

## 2024-03-29 PROCEDURE — 93005 ELECTROCARDIOGRAM TRACING: CPT | Mod: RTG

## 2024-03-29 PROCEDURE — 250N000013 HC RX MED GY IP 250 OP 250 PS 637: Performed by: STUDENT IN AN ORGANIZED HEALTH CARE EDUCATION/TRAINING PROGRAM

## 2024-03-29 PROCEDURE — 84702 CHORIONIC GONADOTROPIN TEST: CPT | Performed by: STUDENT IN AN ORGANIZED HEALTH CARE EDUCATION/TRAINING PROGRAM

## 2024-03-29 PROCEDURE — 96361 HYDRATE IV INFUSION ADD-ON: CPT | Performed by: STUDENT IN AN ORGANIZED HEALTH CARE EDUCATION/TRAINING PROGRAM

## 2024-03-29 PROCEDURE — 85025 COMPLETE CBC W/AUTO DIFF WBC: CPT | Performed by: STUDENT IN AN ORGANIZED HEALTH CARE EDUCATION/TRAINING PROGRAM

## 2024-03-29 RX ORDER — PYRIDOXINE HCL (VITAMIN B6) 25 MG
25 TABLET ORAL ONCE
Status: COMPLETED | OUTPATIENT
Start: 2024-03-29 | End: 2024-03-29

## 2024-03-29 RX ORDER — ONDANSETRON 2 MG/ML
4 INJECTION INTRAMUSCULAR; INTRAVENOUS EVERY 30 MIN PRN
Status: DISCONTINUED | OUTPATIENT
Start: 2024-03-29 | End: 2024-03-29 | Stop reason: HOSPADM

## 2024-03-29 RX ORDER — ONDANSETRON 4 MG/1
4 TABLET, ORALLY DISINTEGRATING ORAL EVERY 8 HOURS PRN
Qty: 15 TABLET | Refills: 0 | Status: SHIPPED | OUTPATIENT
Start: 2024-03-29 | End: 2024-04-03

## 2024-03-29 RX ADMIN — SODIUM CHLORIDE, POTASSIUM CHLORIDE, SODIUM LACTATE AND CALCIUM CHLORIDE 1000 ML: 600; 310; 30; 20 INJECTION, SOLUTION INTRAVENOUS at 20:13

## 2024-03-29 RX ADMIN — Medication 25 MG: at 18:37

## 2024-03-29 RX ADMIN — ONDANSETRON 4 MG: 2 INJECTION INTRAMUSCULAR; INTRAVENOUS at 19:50

## 2024-03-29 RX ADMIN — DEXTROSE AND SODIUM CHLORIDE 500 ML: 5; 900 INJECTION, SOLUTION INTRAVENOUS at 19:51

## 2024-03-29 RX ADMIN — DOXYLAMINE SUCCINATE 25 MG: 25 TABLET ORAL at 18:37

## 2024-03-29 ASSESSMENT — ACTIVITIES OF DAILY LIVING (ADL)
ADLS_ACUITY_SCORE: 35
ADLS_ACUITY_SCORE: 33

## 2024-03-29 NOTE — ED PROVIDER NOTES
ED Provider Note  [facility]      History     Chief Complaint   Patient presents with    Emesis During Pregnancy     Increasing nausea and vomiting due to  pregnancy, 6 weeks pregnant, dizzy and headache today.     HPI  Cathy Mace is a 28 year old  female 6 weeks pregnant who presents to the emergency department for nausea, vomiting, and dizziness.    Patient notes that her last menstrual cycle was 2024.  States that by this she is supposed to be about 6 weeks pregnant.  She has been having increasing nausea, p.o. intolerance, dizziness and headache since the last week.  Has not been able to take any medications and has not tried any aside from Tylenol which was not helping.  Notes that she had very similar symptoms during her previous pregnancy, and was given a medication by her doctor back in [place] and is hoping for something similar today.  Endorses at times occasional abdominal cramping, but none at this point in time.  No vaginal bleeding or discharge.    Past Medical History  History reviewed. No pertinent past medical history.  Past Surgical History:   Procedure Laterality Date    APPENDECTOMY       ondansetron (ZOFRAN ODT) 4 MG ODT tab  acetaminophen (TYLENOL) 325 MG tablet  escitalopram (LEXAPRO) 20 MG tablet  lactobacillus rhamnosus, GG, (CULTURELL) capsule  levothyroxine (SYNTHROID/LEVOTHROID) 75 MCG tablet  oxyCODONE (ROXICODONE) 5 MG tablet  polyethylene glycol (MIRALAX) 17 GM/Dose powder      No Known Allergies  Family History  No family history on file.  Social History   Social History     Tobacco Use    Smoking status: Never    Smokeless tobacco: Never   Substance Use Topics    Alcohol use: Never    Drug use: Never         A medically appropriate review of systems was performed with pertinent positives and negatives noted in the HPI, and all other systems negative.    Physical Exam   BP: 96/66  Pulse: 87  Temp: 97.6  F (36.4  C)  Resp: 16  SpO2: 100  %  Physical Exam  GEN: Well appearing, non toxic, cooperative  HEENT: normocephalic and atraumatic, PERRLA, EOMI, mildly dry tacky mucous membranes  CV: well-perfused, normal skin color for ethnicity, regular rate and rhythm  PULM: breathing comfortably, in no respiratory distress, clear to auscultation upper and lower lung fields  ABD: nondistended, nontender, nonperitonitic  EXT: Full range of motion.  No edema.  NEURO: awake, conversant, grossly normal bilateral upper and lower extremity strength & ROM   SKIN: No rashes, ecchymosis, or lacerations  PSYCH: Calm and cooperative, interactive      ED Course, Procedures, & Data      Procedures            EKG Interpretation:      Interpreted by Maggi Sainz MD  Time reviewed: 1808  Symptoms at time of EKG: nausea   Rhythm: normal sinus   Rate: normal  Axis: normal  Ectopy: none  Conduction: normal  ST Segments/ T Waves: No ST-T wave changes  Q Waves: none  Comparison to prior: Unchanged    Clinical Impression: normal EKG          Results for orders placed or performed during the hospital encounter of 03/29/24   UA with Microscopic reflex to Culture     Status: Abnormal    Specimen: Urine, Midstream   Result Value Ref Range    Color Urine Light Yellow Colorless, Straw, Light Yellow, Yellow    Appearance Urine Clear Clear    Glucose Urine Negative Negative mg/dL    Bilirubin Urine Negative Negative    Ketones Urine Negative Negative mg/dL    Specific Gravity Urine 1.006 1.003 - 1.035    Blood Urine Negative Negative    pH Urine 6.5 5.0 - 7.0    Protein Albumin Urine Negative Negative mg/dL    Urobilinogen Urine Normal Normal, 2.0 mg/dL    Nitrite Urine Negative Negative    Leukocyte Esterase Urine Negative Negative    Mucus Urine Present (A) None Seen /LPF    RBC Urine 2 <=2 /HPF    WBC Urine 1 <=5 /HPF    Squamous Epithelials Urine 8 (H) <=1 /HPF    Narrative    Urine Culture not indicated   Comprehensive metabolic panel     Status: Abnormal   Result Value Ref Range     Sodium 136 135 - 145 mmol/L    Potassium 3.3 (L) 3.4 - 5.3 mmol/L    Carbon Dioxide (CO2) 25 22 - 29 mmol/L    Anion Gap 11 7 - 15 mmol/L    Urea Nitrogen 5.2 (L) 6.0 - 20.0 mg/dL    Creatinine 0.63 0.51 - 0.95 mg/dL    GFR Estimate >90 >60 mL/min/1.73m2    Calcium 10.1 (H) 8.6 - 10.0 mg/dL    Chloride 100 98 - 107 mmol/L    Glucose 72 70 - 99 mg/dL    Alkaline Phosphatase 73 40 - 150 U/L    AST 24 0 - 45 U/L    ALT 18 0 - 50 U/L    Protein Total 9.0 (H) 6.4 - 8.3 g/dL    Albumin 5.2 3.5 - 5.2 g/dL    Bilirubin Total 0.2 <=1.2 mg/dL   Lipase     Status: Normal   Result Value Ref Range    Lipase 35 13 - 60 U/L   Magnesium     Status: Normal   Result Value Ref Range    Magnesium 2.2 1.7 - 2.3 mg/dL   Phosphorus     Status: Normal   Result Value Ref Range    Phosphorus 3.4 2.5 - 4.5 mg/dL   HCG quantitative pregnancy (blood)     Status: Abnormal   Result Value Ref Range    hCG Quantitative 70,946 (H) <5 mIU/mL   CBC with platelets and differential     Status: None   Result Value Ref Range    WBC Count 7.2 4.0 - 11.0 10e3/uL    RBC Count 5.03 3.80 - 5.20 10e6/uL    Hemoglobin 13.8 11.7 - 15.7 g/dL    Hematocrit 43.3 35.0 - 47.0 %    MCV 86 78 - 100 fL    MCH 27.4 26.5 - 33.0 pg    MCHC 31.9 31.5 - 36.5 g/dL    RDW 14.2 10.0 - 15.0 %    Platelet Count 256 150 - 450 10e3/uL    % Neutrophils 49 %    % Lymphocytes 45 %    % Monocytes 5 %    % Eosinophils 1 %    % Basophils 0 %    % Immature Granulocytes 0 %    NRBCs per 100 WBC 0 <1 /100    Absolute Neutrophils 3.5 1.6 - 8.3 10e3/uL    Absolute Lymphocytes 3.2 0.8 - 5.3 10e3/uL    Absolute Monocytes 0.4 0.0 - 1.3 10e3/uL    Absolute Eosinophils 0.0 0.0 - 0.7 10e3/uL    Absolute Basophils 0.0 0.0 - 0.2 10e3/uL    Absolute Immature Granulocytes 0.0 <=0.4 10e3/uL    Absolute NRBCs 0.0 10e3/uL   EKG 12-lead, tracing only     Status: None (Preliminary result)   Result Value Ref Range    Systolic Blood Pressure  mmHg    Diastolic Blood Pressure  mmHg    Ventricular Rate 76 BPM     Atrial Rate 76 BPM    MD Interval 168 ms    QRS Duration 86 ms     ms    QTc 393 ms    P Axis 53 degrees    R AXIS 56 degrees    T Axis 31 degrees    Interpretation ECG Sinus rhythm  Low voltage QRS  Borderline ECG      Adult Type and Screen     Status: None   Result Value Ref Range    ABO/RH(D) A POS     Antibody Screen Negative Negative    SPECIMEN EXPIRATION DATE [id number]    CBC with platelets differential     Status: None    Narrative    The following orders were created for panel order CBC with platelets differential.  Procedure                               Abnormality         Status                     ---------                               -----------         ------                     CBC with platelets and d...[[id number]]                      Final result                 Please view results for these tests on the individual orders.   ABO/Rh type and screen     Status: None    Narrative    The following orders were created for panel order ABO/Rh type and screen.  Procedure                               Abnormality         Status                     ---------                               -----------         ------                     Adult Type and Screen[[id number]]                            Final result                 Please view results for these tests on the individual orders.     Medications   lactated ringers BOLUS 1,000 mL (1,000 mLs Intravenous $New Bag 3/29/24 2013)   ondansetron (ZOFRAN) injection 4 mg (4 mg Intravenous $Given 3/29/24 1950)   pyridOXINE (VITAMIN B6) tablet 25 mg (25 mg Oral $Given 3/29/24 1837)   doxylamine (UNISOM) tablet 25 mg (25 mg Oral $Given 3/29/24 1837)   dextrose 5% and 0.9% NaCl BOLUS 500 mL (0 mLs Intravenous Stopped 3/29/24 2012)     Labs Ordered and Resulted from Time of ED Arrival to Time of ED Departure   ROUTINE UA WITH MICROSCOPIC REFLEX TO CULTURE - Abnormal       Result Value    Color Urine Light Yellow      Appearance Urine Clear      Glucose  Urine Negative      Bilirubin Urine Negative      Ketones Urine Negative      Specific Gravity Urine 1.006      Blood Urine Negative      pH Urine 6.5      Protein Albumin Urine Negative      Urobilinogen Urine Normal      Nitrite Urine Negative      Leukocyte Esterase Urine Negative      Mucus Urine Present (*)     RBC Urine 2      WBC Urine 1      Squamous Epithelials Urine 8 (*)    COMPREHENSIVE METABOLIC PANEL - Abnormal    Sodium 136      Potassium 3.3 (*)     Carbon Dioxide (CO2) 25      Anion Gap 11      Urea Nitrogen 5.2 (*)     Creatinine 0.63      GFR Estimate >90      Calcium 10.1 (*)     Chloride 100      Glucose 72      Alkaline Phosphatase 73      AST 24      ALT 18      Protein Total 9.0 (*)     Albumin 5.2      Bilirubin Total 0.2     HCG QUANTITATIVE PREGNANCY - Abnormal    hCG Quantitative 70,946 (*)    LIPASE - Normal    Lipase 35     MAGNESIUM - Normal    Magnesium 2.2     PHOSPHORUS - Normal    Phosphorus 3.4     CBC WITH PLATELETS AND DIFFERENTIAL    WBC Count 7.2      RBC Count 5.03      Hemoglobin 13.8      Hematocrit 43.3      MCV 86      MCH 27.4      MCHC 31.9      RDW 14.2      Platelet Count 256      % Neutrophils 49      % Lymphocytes 45      % Monocytes 5      % Eosinophils 1      % Basophils 0      % Immature Granulocytes 0      NRBCs per 100 WBC 0      Absolute Neutrophils 3.5      Absolute Lymphocytes 3.2      Absolute Monocytes 0.4      Absolute Eosinophils 0.0      Absolute Basophils 0.0      Absolute Immature Granulocytes 0.0      Absolute NRBCs 0.0     TYPE AND SCREEN, ADULT    ABO/RH(D) A POS      Antibody Screen Negative      SPECIMEN EXPIRATION DATE [id number]     ABO/RH TYPE AND SCREEN     No orders to display          Critical care was not performed.     Medical Decision Making  The patient's presentation was of moderate complexity (an undiagnosed new problem with uncertain diagnosis).    The patient's evaluation involved:  review of external note(s) from 3+ sources  (see separate area of note for details)  review of 3+ test result(s) ordered prior to this encounter (see separate area of note for details)  ordering and/or review of 3+ test(s) in this encounter (see separate area of note for details)    The patient's management necessitated high risk (a decision regarding hospitalization).    Assessment & Plan    28-year-old  female currently approximately 6 weeks pregnant by last menstrual cycle with positive pregnancy urine in clinic 9 days ago presents emergency department due to 1 week of nausea, vomiting, headache and dizziness similar to what she had during her last pregnancy.  Primarily concerned that she may be dehydrated here, and is hoping for medications to help her with her early pregnancy nausea.    Clinically vitally stable.  Some mildly dry tachycardia) consistent with mild dehydration.  Will plan for IV fluids, and multiple antiemetics including vitamin B6, Unisom, and Zofran.  Will plan for initial fluid bolus with regular IV fluids, and second bolus with D5 due to pipe possible hyperemesis, however early in pregnancy for this to be as expected.  Will evaluate with quantitative pregnancy test, however abdominal exam is relatively benign right now, with no significant vaginal bleeding with low suspicion of ectopic pregnancy.    8:52 PM lab work reassuring.  Patient is overall feeling quite a bit better.  With no ongoing vaginal bleeding, abdominal pain, or lightheadedness, will continue to hold on ultrasound for now.  Will plan for her to follow-up with her OB/GYN, and patient is aware of return precautions for ectopic pregnancy    I have reviewed the nursing notes. I have reviewed the findings, diagnosis, plan and need for follow up with the patient.    New Prescriptions    ONDANSETRON (ZOFRAN ODT) 4 MG ODT TAB    Take 1 tablet (4 mg) by mouth every 8 hours as needed       Final diagnoses:   Nausea and vomiting during pregnancy       MD FABIAN White  [facility] EMERGENCY DEPARTMENT  3/29/2024     Maggi Sainz MD  03/29/24 2052

## 2024-03-30 LAB
ATRIAL RATE - MUSE: 76 BPM
DIASTOLIC BLOOD PRESSURE - MUSE: NORMAL MMHG
INTERPRETATION ECG - MUSE: NORMAL
P AXIS - MUSE: 53 DEGREES
PR INTERVAL - MUSE: 168 MS
QRS DURATION - MUSE: 86 MS
QT - MUSE: 350 MS
QTC - MUSE: 393 MS
R AXIS - MUSE: 56 DEGREES
SYSTOLIC BLOOD PRESSURE - MUSE: NORMAL MMHG
T AXIS - MUSE: 31 DEGREES
VENTRICULAR RATE- MUSE: 76 BPM

## 2024-03-30 NOTE — DISCHARGE INSTRUCTIONS
You were seen in the department due to morning sickness during early pregnancy.  You had lab work here, fluids, and medications all of which helped you feel better, and your lab work is reassuring.    For your symptoms we would recommend that you do a number of different things to help you.  You can take Tylenol for pain.  If you are noticing that you are having more nausea at certain parts of the day, we would recommend having small snacks available for you to eat and drink at that time including some ginger tea, ginger ale.    You could take multiple medications for nausea and the following order:  Vitamin B6 10 to 25 mg every 6-8 hours (over the counter)     2. Unisom 20 mg extended release at bedtime with an option of adding an additional 10 mg in the morning (over the counter, might make you a little sleepy)    3. Zofran 4mg dissolvable tablet (prescription)    Follow-up with your OB/GYN as previously planned next week

## 2024-04-02 PROBLEM — K60.2 RECTAL FISSURE: Status: ACTIVE | Noted: 2024-03-20

## 2024-04-02 PROBLEM — E03.9 HYPOTHYROIDISM (ACQUIRED): Status: ACTIVE | Noted: 2024-03-20

## 2024-04-02 RX ORDER — VITAMIN A ACETATE, BETA CAROTENE, ASCORBIC ACID, CHOLECALCIFEROL, .ALPHA.-TOCOPHEROL ACETATE, DL-, THIAMINE MONONITRATE, RIBOFLAVIN, NIACINAMIDE, PYRIDOXINE HYDROCHLORIDE, FOLIC ACID, CYANOCOBALAMIN, CALCIUM CARBONATE, FERROUS FUMARATE, ZINC OXIDE, CUPRIC OXIDE 3080; 12; 120; 400; 1; 1.84; 3; 20; 22; 920; 25; 200; 27; 10; 2 [IU]/1; UG/1; MG/1; [IU]/1; MG/1; MG/1; MG/1; MG/1; MG/1; [IU]/1; MG/1; MG/1; MG/1; MG/1; MG/1
1 TABLET, FILM COATED ORAL DAILY
COMMUNITY
Start: 2024-03-20 | End: 2024-06-26

## 2024-04-04 ENCOUNTER — OFFICE VISIT (OUTPATIENT)
Dept: FAMILY MEDICINE | Facility: CLINIC | Age: 29
End: 2024-04-04
Payer: COMMERCIAL

## 2024-04-04 VITALS
OXYGEN SATURATION: 97 % | WEIGHT: 166 LBS | SYSTOLIC BLOOD PRESSURE: 110 MMHG | HEART RATE: 84 BPM | HEIGHT: 66 IN | TEMPERATURE: 97.6 F | DIASTOLIC BLOOD PRESSURE: 76 MMHG | BODY MASS INDEX: 26.68 KG/M2

## 2024-04-04 DIAGNOSIS — Z34.81 ENCOUNTER FOR SUPERVISION OF OTHER NORMAL PREGNANCY IN FIRST TRIMESTER: ICD-10-CM

## 2024-04-04 DIAGNOSIS — E03.9 HYPOTHYROIDISM (ACQUIRED): ICD-10-CM

## 2024-04-04 DIAGNOSIS — Z00.00 ENCOUNTER FOR MEDICAL EXAMINATION TO ESTABLISH CARE: Primary | ICD-10-CM

## 2024-04-04 DIAGNOSIS — K60.2 RECTAL FISSURE: ICD-10-CM

## 2024-04-04 RX ORDER — FAMOTIDINE 20 MG/1
20 TABLET, FILM COATED ORAL 2 TIMES DAILY
COMMUNITY

## 2024-04-04 NOTE — PROGRESS NOTES
"CC: Establish Care (Referral to OB)      SUBJECTIVE: Cathy is a 28 year old female who comes in to establish care. She is here with her  and her son. She recently moved from [place] for her 's job as an . Concerns today: Early pregnancy, referral to Ob/Gyn.     Pregnancy.  at 7w1d. She is taking a prenatal vitamin. 7 weeks 1 day. Symptoms include - Acid reflux and nausea, famotidine 20 mg twice daily, Unisom 25 mg at bedtime. Went to ED for symptoms on 3/29/2024 (note reviewed). Still has nausea, but no vomiting. Has fatigue. No vaginal bleeding, cramping. First pregnancy c/b  labor at 36w1d, . Has had back pain since epidural. She did PT in [place], but prefers to hold off on resuming PT at this point. Pain sometimes radiates down the left leg, but no numbness, tingling, or weakness in the leg.     Hypothyroidism. Long-standing diagnosis (about 3 years).   - Medication: Has been on stable dosing of levothyroxine 75 mcg daily.   - Most recent TSH reviewed today - 3/20/2024: TSH 2.08 ([facility]  - Since last visit, patient states:               - No palpitations, sweating, diarrhea, hair loss, temperature intolerance              - No leg swelling, dry skin, brittle nails    History of post-partum depression. Long-standing diagnosis.   - Medication: Previously on escitalopram 20 mg daily, but tapered off and yesterday was her last pill (reviewed note from [facility] on 3/20/2024).    - Mood feels good currently.     Rectal fissure. Has been taking Miralax which has helped. Has an apt with colon and rectal surgery next month.      PMH, PSH, FH, allergies, and medications reviewed and updated in Epic.       OBJECTIVE:   /76   Pulse 84   Temp 97.6  F (36.4  C)   Ht 1.683 m (5' 6.26\")   Wt 75.3 kg (166 lb)   LMP 2024   SpO2 97%   BMI 26.58 kg/m    General: Alert and oriented in no acute distress.  Skin: Clear without lesions or rashes. "   Lymph: No anterior cervical lymphadenopathy.   Eyes: PERRL. EOMI.   ENT: TMs intact and pearly gray. Oropharynx moist without lesions or exudate. Supple neck.  Neuro: Grossly intact, nonfocal.  Cardio: RRR, normal S1 and S2, without murmurs, rubs, or gallops appreciated.    Resp: CTA bilaterally. Normal respiratory effort.   MSK: Distal pulses 2+ and symmetric, extremities without deformity, edema. Strength and sensation equal in bilateral lower extremities. Patellar reflexes 2+.   Psych: Mood and affect appropriate.      ASSESSMENT/PLAN:   Cathy was seen today for establish care.    Encounter for medical examination to establish care  PMH, PSH, FH, SH, medications, and allergies reviewed and updated in [facility]. Clinic fax # provided so that she can send over records from [place].     Encounter for supervision of other normal pregnancy in first trimester  Referral to Ob/Gyn. Discussed dosing of B6 and Unisom for nausea.   -     Ob/Gyn  Referral    Hypothyroidism (acquired)  Chronic, stable. Discussed typically needing to adjust dose in pregnancy, had labs 2 weeks ago which showed TSH within range, so will monitor on current levothyroxine 75 mcg daily. Recheck with prenatal labs in the next few weeks and then check each trimester as long as levels within trimester-specific reference ranges.     Post partum depression  Recently tapered off escitalopram. Currently feels that symptoms are stable. Discussed reaching out if symptoms worsen off medication, and we can certainly resume a selective serotonin reuptake inhibitor.     Rectal fissure  Has appt scheduled with colon and rectal surgery.       Aurora Anders MD  Family Medicine

## 2024-04-04 NOTE — PATIENT INSTRUCTIONS
"[facility] [address]  [address] [place] [id number]  [phone] Appointments    Weeks 6 to 10 of Your Pregnancy: Care Instructions  During these weeks of pregnancy, your body goes through many changes. You may start to feel different, both in your body and your emotions. Each pregnancy is different, so there's no \"right\" way to feel. These early weeks are a time to make healthy choices for you and your pregnancy.    Take a daily prenatal vitamin. Choose one with folic acid in it.    Avoid alcohol, tobacco, and drugs (including marijuana). If you need help quitting, talk to your doctor.    Drink plenty of liquids.  Be sure to drink enough water. And limit sodas, other sweetened drinks, and caffeine.     Choose foods that are good sources of calcium, iron, and folate.  You can try dairy products, dark leafy greens, fortified orange juice and cereals, almonds, broccoli, dried fruit, and beans.     Avoid foods that may be harmful.  Don't eat raw meat, deli meat, raw seafood, or raw eggs. Avoid soft cheese and unpasteurized dairy, like Brie and blue cheese. And don't eat fish that contains a lot of mercury, like shark and swordfish.     Don't touch tasneem litter or cat poop.  They can cause an infection that could be harmful during pregnancy.     Avoid things that can make your body too hot.  For example, avoid hot tubs and saunas.     Soothe morning sickness.  Try eating 5 or 6 small meals a day, getting some fresh air, or using nusrat to control symptoms.     Ask your doctor about flu and COVID-19 shots.  Getting them can help protect against infection.   Follow-up care is a key part of your treatment and safety. Be sure to make and go to all appointments, and call your doctor if you are having problems. It's also a good idea to know your test results and keep a list of the medicines you take.  Where can you learn more?  Go to https://www.healthwise.net/patiented  Enter G112 in the search " "box to learn more about \"Weeks 6 to 10 of Your Pregnancy: Care Instructions.\"  Current as of: July 10, 2023               Content Version: 14.0 2006-2024 [employer].   Care instructions adapted under license by your healthcare professional. If you have questions about a medical condition or this instruction, always ask your healthcare professional. [employer] disclaims any warranty or liability for your use of this information.         Managing Morning Sickness (01:55)  Your health professional recommends that you watch this short online health video.  Learn tips for dealing with morning sickness, no matter what time of day you have it.  Purpose:  Gives tips for managing morning sickness, including eating small low-fat meals and avoiding caffeine and spicy food.  Goal:  The user will learn tips for dealing with morning sickness during pregnancy.     How to watch the video    Scan the QR code   OR Visit the website    [email or web address]   Current as of: July 10, 2023               Content Version: 14.0 2006-2024 [employer].   Care instructions adapted under license by your healthcare professional. If you have questions about a medical condition or this instruction, always ask your healthcare professional. [employer] disclaims any warranty or liability for your use of this information.      Pregnancy and Heartburn: Care Instructions  Overview     Heartburn is a common problem during pregnancy.  Heartburn happens when stomach acid backs up into the tube that carries food to the stomach. This tube is called the esophagus. Early in pregnancy, heartburn is caused by hormone changes that slow down digestion. Later on, it's also caused by the large uterus pushing up on the stomach.  Even though you can't fix the cause, there are things you can do to get relief. Treating heartburn during pregnancy focuses first on making lifestyle changes, like " "changing what and how you eat, and on taking medicines.  Heartburn usually improves or goes away after childbirth.  Follow-up care is a key part of your treatment and safety. Be sure to make and go to all appointments, and call your doctor if you are having problems. It's also a good idea to know your test results and keep a list of the medicines you take.  How can you care for yourself at home?  Eat small, frequent meals.  Avoid foods that make your symptoms worse, such as chocolate, peppermint, and spicy foods. Avoid drinks with caffeine, such as coffee, tea, and sodas.  Avoid bending over or lying down after meals.  Take a short walk after you eat.  If heartburn is a problem at night, do not eat for 2 hours before bedtime.  Take antacids like Mylanta, Maalox, Rolaids, or Tums. Do not take antacids that have sodium bicarbonate, magnesium trisilicate, or aspirin. Be careful when you take over-the-counter antacid medicines. Many of these medicines have aspirin in them. While you are pregnant, do not take aspirin or medicines that contain aspirin unless your doctor says it is okay.  If you're not getting relief, talk to your doctor. You may be able to take a stronger acid-reducing medicine.  When should you call for help?   Call your doctor now or seek immediate medical care if:    You have new or worse belly pain.     You are vomiting.   Watch closely for changes in your health, and be sure to contact your doctor if:    You have new or worse symptoms of reflux.     You are losing weight.     You have trouble or pain swallowing.     You do not get better as expected.   Where can you learn more?  Go to https://www.[employer].net/patiented  Enter U946 in the search box to learn more about \"Pregnancy and Heartburn: Care Instructions.\"  Current as of: July 10, 2023               Content Version: 14.0    3029-3581 [employer].   Care instructions adapted under license by your healthcare professional. If you " have questions about a medical condition or this instruction, always ask your healthcare professional. Healthwise, [facility] disclaims any warranty or liability for your use of this information.      Constipation: Care Instructions  Overview     Constipation means that you have a hard time passing stools (bowel movements). People pass stools from 3 times a day to once every 3 days. What is normal for you may be different. Constipation may occur with pain in the rectum and cramping. The pain may get worse when you try to pass stools. Sometimes there are small amounts of bright red blood on toilet paper or the surface of stools. This is because of enlarged veins near the rectum (hemorrhoids).  A few changes in your diet and lifestyle may help you avoid ongoing constipation. Your doctor may also prescribe medicine to help loosen your stool.  Some medicines can cause constipation. These include pain medicines and antidepressants. Tell your doctor about all the medicines you take. Your doctor may want to make a medicine change to ease your symptoms.  Follow-up care is a key part of your treatment and safety. Be sure to make and go to all appointments, and call your doctor if you are having problems. It's also a good idea to know your test results and keep a list of the medicines you take.  How can you care for yourself at home?  Drink plenty of fluids. If you have kidney, heart, or liver disease and have to limit fluids, talk with your doctor before you increase the amount of fluids you drink.  Include high-fiber foods in your diet each day. These include fruits, vegetables, beans, and whole grains.  Get at least 30 minutes of exercise on most days of the week. Walking is a good choice. You also may want to do other activities, such as running, swimming, cycling, or playing tennis or team sports.  Take a fiber supplement, such as Citrucel or Metamucil, every day. Read and follow all instructions on the  "label.  Schedule time each day for a bowel movement. A daily routine may help. Take your time having a bowel movement, but don't sit for more than 10 minutes at a time. And don't strain too much.  Support your feet with a small step stool when you sit on the toilet. This helps flex your hips and places your pelvis in a squatting position.  Your doctor may recommend an over-the-counter laxative to relieve your constipation. Examples are Milk of Magnesia and MiraLax. Read and follow all instructions on the label. Do not use laxatives on a long-term basis.  When should you call for help?   Call your doctor now or seek immediate medical care if:    You have new or worse belly pain.     You have new or worse nausea or vomiting.     You have blood in your stools.   Watch closely for changes in your health, and be sure to contact your doctor if:    Your constipation is getting worse.     You do not get better as expected.   Where can you learn more?  Go to https://www.[employer].net/patiented  Enter P343 in the search box to learn more about \"Constipation: Care Instructions.\"  Current as of: October 19, 2023               Content Version: 14.0    6444-3517 [employer].   Care instructions adapted under license by your healthcare professional. If you have questions about a medical condition or this instruction, always ask your healthcare professional. [employer] disclaims any warranty or liability for your use of this information.      "

## 2024-04-08 ENCOUNTER — VIRTUAL VISIT (OUTPATIENT)
Dept: OBGYN | Facility: CLINIC | Age: 29
End: 2024-04-08
Payer: COMMERCIAL

## 2024-04-08 DIAGNOSIS — Z32.01 PREGNANCY TEST POSITIVE: Primary | ICD-10-CM

## 2024-04-08 RX ORDER — ONDANSETRON 4 MG/1
1 TABLET, ORALLY DISINTEGRATING ORAL
COMMUNITY
Start: 2024-04-06

## 2024-04-08 NOTE — PROGRESS NOTES
[facility] RN Prenatal Intake note  Subjective     28 year old female newly pregnant.  LMP: 2024 (exact)  Pregnancy is planned.    Partner/support person name and relationship -  Braydon.        Symptoms since LMP include nausea, vomiting, constipation, and fatigue. Patient has tried these relief measures: Zofran, vitamin B-6, Unisom, small frequent meals, and increased rest.    OB HISTORY  OB History    Para Term  AB Living   2 1 0 1 0 1   SAB IAB Ectopic Multiple Live Births   0 0 0 0 1      # Outcome Date GA Lbr Raymond/2nd Weight Sex Delivery Anes PTL Lv   2 Current            1  21 36w4d   M Vag-Spont EPI  CARMEN     OB COMPLICATIONS  First Pregnancy No  GDM No  HTN No  Preeclampsia No   labor Yes: Water broke on own   birth Yes: Water broke on own   birth No  PP hemorrhage No  Retained placenta No  PP mood disorder Yes: postpartum depression, started right after delivery d/t pain (was diagnosed with a UTI that was causing severe pain), Lexapro from  to   (good results)  Fetal anomaly No  FGR No  Macrosomia  No  3rd or 4th degree laceration  No  Shoulder dystocia No  NICU admit No    PERSONAL/SOCIAL HISTORY  *updated all tabs in history   and lives with their family.  Employment: Unemployed.  Her partner works as a shah[employer] assistant design.     MENTAL HEALTH HISTORY:  past  mood disorder and past medication    - Current Medications   Current Outpatient Medications   Medication Sig Dispense Refill    acetaminophen (TYLENOL) 325 MG tablet Take 325-650 mg by mouth every 6 hours as needed for mild pain      doxylamine (UNISOM) 25 MG TABS tablet Take 20 mg by mouth at bedtime      famotidine (PEPCID) 20 MG tablet Take 20 mg by mouth 2 times daily      levothyroxine (SYNTHROID/LEVOTHROID) 75 MCG tablet Take 75 mcg by mouth daily      ondansetron (ZOFRAN ODT) 4 MG ODT tab Take 1 tablet by mouth 3 times daily      polyethylene glycol (MIRALAX) 17  GM/Dose powder Take 17 g (1 Capful) by mouth daily as needed for constipation 527 g 0    Prenatal Vit-Fe Fumarate-FA (PRENATAL PLUS) 27-1 MG TABS Take 1 tablet by mouth daily             - Co-morbids   Past Medical History:   Diagnosis Date    Hypothyroidism (acquired) 03/20/2024    Post partum depression 03/20/2024    was on Lexapro, weaned from 20 mg to 5 mg and ended 04/03    Rectal fissure 03/20/2024     - Genetic/Infection questionnaire completed,  no risks on questionnaire. Discussed genetic screening options, patient unsure, needs more info on first trimester genetic screening  Pt does not have a recent known exposure to Parvo or CMV so IgG/IgM testing WILL NOT be ordered.   Vaccines: June/July received a large amount of vaccines - will bring card for documentation purposes  - Discussed expectations for routine prenatal care and scheduling.    -Reconciled and reviewed problem list    -Pt scheduled for dating US and NOB on 04/25/2024    Divine PICHARDO RN

## 2024-04-08 NOTE — PATIENT INSTRUCTIONS
Thank you for trusting us with your care!     If you need to contact us for questions about:  Symptoms, Scheduling & Medical Questions; Non-urgent (2-3 day response) Alberjonathankeegan message, Urgent (needing response today) [phone] (if after 3:30pm next day response)   Prescriptions: Please call your Pharmacy   Billing: Chris [phone] or [facility]:[phone]   Learning About Pregnancy  Your Care Instructions     Your health in the early weeks of your pregnancy is particularly important for your baby's health. Take good care of yourself. Anything you do that harms your body can also harm your baby.  Make sure to go to all of your doctor appointments. Regular checkups will help keep you and your baby healthy.  How can you care for yourself at home?  Diet    Choose healthy foods like fruits, vegetables, whole grains, lean proteins, and healthy fats.     Choose foods that are good sources of calcium, iron, and folate. You can try dairy products, dark leafy greens, fortified orange juice and cereals, almonds, broccoli, dried fruit, and beans.     Do not skip meals or go for many hours without eating. If you are nauseated, try to eat a small, healthy snack every 2 to 3 hours.     Avoid fish that are high in mercury. These include shark, swordfish, samuel mackerel, marlin, orange roughy, and bigeye tuna, as well as tilefish from the [facility].     It's okay to eat up to 8 to 12 ounces a week of fish that are low in mercury or up to 4 ounces a week of fish that have medium levels of mercury. Some fish that are low in mercury are salmon, shrimp, canned light tuna, cod, and tilapia. Some fish that have medium levels of mercury are halibut and white albacore tuna.     Drink plenty of fluids. If you have kidney, heart, or liver disease and have to limit fluids, talk with your doctor before you increase the amount of fluids you drink.     Limit caffeine to about 200 to 300 mg per day. On average, a cup of brewed  coffee has around 80 to 100 mg of caffeine.     Do not drink alcohol, such as beer, wine, or hard liquor.     Take a multivitamin that contains at least 400 micrograms (mcg) of folic acid to help prevent birth defects. Fortified cereal and whole wheat bread are good additional sources of folic acid.     Increase the calcium in your diet. Try to drink a quart of skim milk each day. You may also take calcium supplements and choose foods such as cheese and yogurt.   Lifestyle    Make sure you go to your follow-up appointments.     Get plenty of rest. You may be unusually tired while you are pregnant.     Get at least 30 minutes of exercise on most days of the week. Walking is a good choice. If you have not exercised in the past, start out slowly. Take several short walks each day.     Do not smoke. If you need help quitting, talk to your doctor about stop-smoking programs. These can increase your chances of quitting for good.     Do not touch cat feces or litter boxes. Also, wash your hands after you handle raw meat, and fully cook all meat before you eat it. Wear gloves when you work in the yard or garden, and wash your hands well when you are done. Cat feces, raw or undercooked meat, and contaminated dirt can cause an infection that may harm your baby or lead to a miscarriage.     Avoid things that can make your body too hot and may be harmful to your baby, such as a hot tub or sauna. Or talk with your doctor before doing anything that raises your body temperature. Your doctor can tell you if it's safe.     Avoid chemical fumes, paint fumes, or poisons.     Do not use illegal drugs, marijuana, or alcohol.   Medicines    Review all of your medicines with your doctor. Some of your routine medicines may need to be changed to protect your baby.     Use acetaminophen (Tylenol) to relieve minor problems, such as a mild headache or backache or a mild fever with cold symptoms. Do not use nonsteroidal anti-inflammatory drugs  "(NSAIDs), such as ibuprofen (Advil, Motrin) or naproxen (Aleve), unless your doctor says it is okay.     Do not take two or more pain medicines at the same time unless the doctor told you to. Many pain medicines have acetaminophen, which is Tylenol. Too much acetaminophen (Tylenol) can be harmful.     Take your medicines exactly as prescribed. Call your doctor if you think you are having a problem with your medicine.   To manage morning sickness    Keep food in your stomach, but not too much at once. Try eating five or six small meals a day instead of three large meals.     For nausea when you wake up, eat a small snack, such as a couple of crackers or pretzels, before rising. Allow a few minutes for your stomach to settle before you slowly get up.     Try to avoid smells and foods that make you feel nauseated. High-fat or greasy foods, milk, and coffee may make nausea worse. Some foods that may be easier to tolerate include cold, spicy, sour, and salty foods.     Drink enough fluids. Water and other caffeine-free drinks are good choices.     Take your prenatal vitamins at night on a full stomach.     Try foods and drinks made with margy. Margy may help with nausea.     Get lots of rest. Morning sickness may be worse when you are tired.     Talk to your doctor about over-the-counter products, such as vitamin B6 or doxylamine, to help relieve symptoms.     Try a P6 acupressure wrist band. These anti-nausea wristbands help some people.   Follow-up care is a key part of your treatment and safety. Be sure to make and go to all appointments, and call your doctor if you are having problems. It's also a good idea to know your test results and keep a list of the medicines you take.  Where can you learn more?  Go to https://www.healthwise.net/patiented  Enter E868 in the search box to learn more about \"Learning About Pregnancy.\"  Current as of: July 10, 2023               Content Version: 14.0    6062-2577 Healthwise, " "Incorporated.   Care instructions adapted under license by your healthcare professional. If you have questions about a medical condition or this instruction, always ask your healthcare professional. [employer] disclaims any warranty or liability for your use of this information.      Weeks 6 to 10 of Your Pregnancy: Care Instructions  During these weeks of pregnancy, your body goes through many changes. You may start to feel different, both in your body and your emotions. Each pregnancy is different, so there's no \"right\" way to feel. These early weeks are a time to make healthy choices for you and your pregnancy.    Take a daily prenatal vitamin. Choose one with folic acid in it.    Avoid alcohol, tobacco, and drugs (including marijuana). If you need help quitting, talk to your doctor.    Drink plenty of liquids.  Be sure to drink enough water. And limit sodas, other sweetened drinks, and caffeine.     Choose foods that are good sources of calcium, iron, and folate.  You can try dairy products, dark leafy greens, fortified orange juice and cereals, almonds, broccoli, dried fruit, and beans.     Avoid foods that may be harmful.  Don't eat raw meat, deli meat, raw seafood, or raw eggs. Avoid soft cheese and unpasteurized dairy, like Brie and blue cheese. And don't eat fish that contains a lot of mercury, like shark and swordfish.     Don't touch tasneem litter or cat poop.  They can cause an infection that could be harmful during pregnancy.     Avoid things that can make your body too hot.  For example, avoid hot tubs and saunas.     Soothe morning sickness.  Try eating 5 or 6 small meals a day, getting some fresh air, or using nusrat to control symptoms.     Ask your doctor about flu and COVID-19 shots.  Getting them can help protect against infection.   Follow-up care is a key part of your treatment and safety. Be sure to make and go to all appointments, and call your doctor if you are having problems. " "It's also a good idea to know your test results and keep a list of the medicines you take.  Where can you learn more?  Go to https://www.StoredIQ.net/patiented  Enter G112 in the search box to learn more about \"Weeks 6 to 10 of Your Pregnancy: Care Instructions.\"  Current as of: July 10, 2023               Content Version: 14.0    1108-3019 [employer].   Care instructions adapted under license by your healthcare professional. If you have questions about a medical condition or this instruction, always ask your healthcare professional. [employer] disclaims any warranty or liability for your use of this information.         Managing Morning Sickness (01:55)  Your health professional recommends that you watch this short online health video.  Learn tips for dealing with morning sickness, no matter what time of day you have it.  Purpose:  Gives tips for managing morning sickness, including eating small low-fat meals and avoiding caffeine and spicy food.  Goal:  The user will learn tips for dealing with morning sickness during pregnancy.     How to watch the video    Scan the QR code   OR Visit the website    [email or web address]   Current as of: July 10, 2023               Content Version: 14.0    9733-1144 [employer].   Care instructions adapted under license by your healthcare professional. If you have questions about a medical condition or this instruction, always ask your healthcare professional. [employer] disclaims any warranty or liability for your use of this information.      Pregnancy and Heartburn: Care Instructions  Overview     Heartburn is a common problem during pregnancy.  Heartburn happens when stomach acid backs up into the tube that carries food to the stomach. This tube is called the esophagus. Early in pregnancy, heartburn is caused by hormone changes that slow down digestion. Later on, it's also caused by the large uterus pushing up " "on the stomach.  Even though you can't fix the cause, there are things you can do to get relief. Treating heartburn during pregnancy focuses first on making lifestyle changes, like changing what and how you eat, and on taking medicines.  Heartburn usually improves or goes away after childbirth.  Follow-up care is a key part of your treatment and safety. Be sure to make and go to all appointments, and call your doctor if you are having problems. It's also a good idea to know your test results and keep a list of the medicines you take.  How can you care for yourself at home?  Eat small, frequent meals.  Avoid foods that make your symptoms worse, such as chocolate, peppermint, and spicy foods. Avoid drinks with caffeine, such as coffee, tea, and sodas.  Avoid bending over or lying down after meals.  Take a short walk after you eat.  If heartburn is a problem at night, do not eat for 2 hours before bedtime.  Take antacids like Mylanta, Maalox, Rolaids, or Tums. Do not take antacids that have sodium bicarbonate, magnesium trisilicate, or aspirin. Be careful when you take over-the-counter antacid medicines. Many of these medicines have aspirin in them. While you are pregnant, do not take aspirin or medicines that contain aspirin unless your doctor says it is okay.  If you're not getting relief, talk to your doctor. You may be able to take a stronger acid-reducing medicine.  When should you call for help?   Call your doctor now or seek immediate medical care if:    You have new or worse belly pain.     You are vomiting.   Watch closely for changes in your health, and be sure to contact your doctor if:    You have new or worse symptoms of reflux.     You are losing weight.     You have trouble or pain swallowing.     You do not get better as expected.   Where can you learn more?  Go to https://www.healthwise.net/patiented  Enter U946 in the search box to learn more about \"Pregnancy and Heartburn: Care " "Instructions.\"  Current as of: July 10, 2023               Content Version: 14.0    5096-0389 [employer].   Care instructions adapted under license by your healthcare professional. If you have questions about a medical condition or this instruction, always ask your healthcare professional. [employer] disclaims any warranty or liability for your use of this information.      Constipation: Care Instructions  Overview     Constipation means that you have a hard time passing stools (bowel movements). People pass stools from 3 times a day to once every 3 days. What is normal for you may be different. Constipation may occur with pain in the rectum and cramping. The pain may get worse when you try to pass stools. Sometimes there are small amounts of bright red blood on toilet paper or the surface of stools. This is because of enlarged veins near the rectum (hemorrhoids).  A few changes in your diet and lifestyle may help you avoid ongoing constipation. Your doctor may also prescribe medicine to help loosen your stool.  Some medicines can cause constipation. These include pain medicines and antidepressants. Tell your doctor about all the medicines you take. Your doctor may want to make a medicine change to ease your symptoms.  Follow-up care is a key part of your treatment and safety. Be sure to make and go to all appointments, and call your doctor if you are having problems. It's also a good idea to know your test results and keep a list of the medicines you take.  How can you care for yourself at home?  Drink plenty of fluids. If you have kidney, heart, or liver disease and have to limit fluids, talk with your doctor before you increase the amount of fluids you drink.  Include high-fiber foods in your diet each day. These include fruits, vegetables, beans, and whole grains.  Get at least 30 minutes of exercise on most days of the week. Walking is a good choice. You also may want to do other " "activities, such as running, swimming, cycling, or playing tennis or team sports.  Take a fiber supplement, such as Citrucel or Metamucil, every day. Read and follow all instructions on the label.  Schedule time each day for a bowel movement. A daily routine may help. Take your time having a bowel movement, but don't sit for more than 10 minutes at a time. And don't strain too much.  Support your feet with a small step stool when you sit on the toilet. This helps flex your hips and places your pelvis in a squatting position.  Your doctor may recommend an over-the-counter laxative to relieve your constipation. Examples are Milk of Magnesia and MiraLax. Read and follow all instructions on the label. Do not use laxatives on a long-term basis.  When should you call for help?   Call your doctor now or seek immediate medical care if:    You have new or worse belly pain.     You have new or worse nausea or vomiting.     You have blood in your stools.   Watch closely for changes in your health, and be sure to contact your doctor if:    Your constipation is getting worse.     You do not get better as expected.   Where can you learn more?  Go to https://www.Consumr.net/patiented  Enter P343 in the search box to learn more about \"Constipation: Care Instructions.\"  Current as of: October 19, 2023               Content Version: 14.0    1437-8776 [employer].   Care instructions adapted under license by your healthcare professional. If you have questions about a medical condition or this instruction, always ask your healthcare professional. [employer] disclaims any warranty or liability for your use of this information.      Learning About High-Iron Foods  What foods are high in iron?     The foods you eat contain nutrients, such as vitamins and minerals. Iron is a nutrient. Your body needs the right amount to stay healthy and work as it should. You can use the list below to help you make choices " "about which foods to eat.  Here are some foods that contain iron. They have 1 to 2 milligrams of iron per serving.  Fruits  Figs (dried), 5 figs  Vegetables  Asparagus (canned), 6 olsen  [place], beet, Swiss chard, or turnip greens, 1 cup  Dried peas, cooked,   cup  Seaweed, spirulina (dried),   cup  Spinach, (cooked)   cup or (raw) 1 cup  Grains  Cereals, fortified with iron, 1 cup  Grits (instant, cooked), fortified with iron,   cup  Meats and other protein foods  Beans (kidney, lima, navy, white), canned or cooked,   cup  Beef or lamb, 3 oz  Chicken giblets, 3 oz  Chickpeas (garbanzo beans),   cup  Liver of beef, lamb, or pork, 3 oz  Oysters (cooked), 3 oz  Sardines (canned), 3 oz  Soybeans (boiled),   cup  Tofu (firm),   cup  Work with your doctor to find out how much of this nutrient you need. Depending on your health, you may need more or less of it in your diet.  Where can you learn more?  Go to https://www.Nanameue.net/patiented  Enter R005 in the search box to learn more about \"Learning About High-Iron Foods.\"  Current as of: September 20, 2023               Content Version: 14.0    8309-8864 [employer].   Care instructions adapted under license by your healthcare professional. If you have questions about a medical condition or this instruction, always ask your healthcare professional. [employer] disclaims any warranty or liability for your use of this information.      Rh Antibodies Screening During Pregnancy: About This Test  What is it?     The Rh antibodies screening test is a blood test. It checks your blood for Rh antibodies. If you have Rh-negative blood and have been exposed to Rh-positive blood, your immune system may make antibodies to attack the Rh-positive blood. When a pregnant woman has these antibodies, it is called Rh sensitization.  Why is this test done?  The Rh antibodies screening test is done during pregnancy to find out if your baby is at risk for Rh " "disease. This can happen if you have Rh-negative blood and your baby has Rh-positive blood. If your Rh-negative blood mixes with Rh-positive blood, your immune system will make antibodies to attack the Rh-positive blood.  During pregnancy, these antibodies could attach to the baby's red blood cells. This can cause your baby to have serious health problems. The results of this test will help your doctor know how to best care for you and your baby during your pregnancy.  How do you prepare for the test?  In general, there's nothing you have to do before this test, unless your doctor tells you to.  How is the test done?  A health professional uses a needle to take a blood sample, usually from the arm.  What happens after the test?  You will probably be able to go home right away. It depends on the reason for the test.  You can go back to your usual activities right away.  Follow-up care is a key part of your treatment and safety. Be sure to make and go to all appointments, and call your doctor if you are having problems. It's also a good idea to keep a list of the medicines you take. Ask your doctor when you can expect to have your test results.  Where can you learn more?  Go to https://www.RemCare.net/patiented  Enter P722 in the search box to learn more about \"Rh Antibodies Screening During Pregnancy: About This Test.\"  Current as of: July 10, 2023               Content Version: 14.0    8017-8025 [employer].   Care instructions adapted under license by your healthcare professional. If you have questions about a medical condition or this instruction, always ask your healthcare professional. [employer] disclaims any warranty or liability for your use of this information.      Learning About Preventing Rh Disease  What is Rh disease?     Rh disease can be a serious problem in pregnancy. It happens when substances called antibodies in the mother's blood cause red blood cells in her baby's " blood to be destroyed. This can occur when the blood types of a mother and her baby do not match.  All blood has an Rh factor. This is what makes a blood type positive or negative. When you are Rh-negative, your baby may be Rh-negative or Rh-positive. If your baby has Rh-positive blood and it mixes with yours, your body will make antibodies. This is called Rh sensitization.  Most of the time, this is not a problem in a first pregnancy. But in future pregnancies, it could cause Rh disease.  A  with Rh disease has mild anemia and may have jaundice. In severe cases, anemia, jaundice, and swelling can be very dangerous or fatal. Some babies need to be delivered early. Some need special care in the NICU. A very sick baby will need a blood transfusion before or after birth.  Fortunately, Rh sensitization is usually easy to prevent.  That's why it's important to get your Rh status checked in your first trimester. It doesn't cause any warning signs. A blood test is the only way to know if you are Rh-sensitive or are at risk for it.  How can you prevent Rh disease?  If you are Rh-negative, your doctor gives you an Rh immune globulin shot (such as RhoGAM). It helps prevent your body from making the antibodies that attack your baby's red blood cells.  Timing is important. You need the shot at certain times during your pregnancy. And you need one anytime there is a chance that your baby's blood might mix with yours. That can happen with certain prenatal tests or when you have pregnancy bleeding, such as:  Right after any pregnancy loss, amniocentesis, or CVS testing.  After turning of a breech baby.  Before and maybe after childbirth. Your doctor gives you a shot around week 28. If your  is Rh-positive, you will have another shot.  Follow-up care is a key part of your treatment and safety. Be sure to make and go to all appointments, and call your doctor if you are having problems. It's also a good idea to know  "your test results and keep a list of the medicines you take.  Where can you learn more?  Go to [email or web address]! Messenger.net/patiented  Enter W177 in the search box to learn more about \"Learning About Preventing Rh Disease.\"  Current as of: July 10, 2023               Content Version: 14.0    9982-8422 [employer].   Care instructions adapted under license by your healthcare professional. If you have questions about a medical condition or this instruction, always ask your healthcare professional. [employer] disclaims any warranty or liability for your use of this information.      Learning About Rh Immunoglobulin Shots  Introduction     An Rh immunoglobulin shot is given to pregnant women who have Rh-negative blood.  You may have Rh-negative blood, and your baby may have Rh-positive blood. If the two types of blood mix, your body will make antibodies. This is called Rh sensitization. Most of the time, this is not a problem the first time you're pregnant. But it could cause problems in future pregnancies.  This shot keeps your body from making the antibodies. You get the shot around 28 weeks of pregnancy. After the birth, your baby's blood is tested. If the blood is Rh positive, you will get another shot. You may also get the shot if you have vaginal bleeding while you are pregnant or if you have a miscarriage. These shots protect future pregnancies.  Women with Rh negative blood will need this shot each time they get pregnant.  Example  Rh immunoglobulin (HypRho-D, MICRhoGAM, and RhoGAM)  Possible side effects  Rare side effects may include:  Some mild pain where you got the shot.  A slight fever.  An allergic reaction.  You may have other side effects not listed here. Check the information that comes with your medicine.  What to know about taking this medicine  You may need more than one shot. You may need the shot again:  After amniocentesis, fetal blood sampling, or chorionic villus " "sampling tests.  If you have bleeding in your second or third trimester.  After turning of a breech baby.  After an injury to the belly while you are pregnant.  After a miscarriage or an .  Before or right after treatment for an ectopic or a partial molar pregnancy.  Tell your doctor if you have any allergies or have had a bad response to medicines in the past.  If you get this shot within 3 months of getting a live-virus vaccine, the vaccine may not work. Your doctor will tell you if you need more vaccine.  Check with your doctor or pharmacist before you use any other medicines. This includes over-the-counter medicines. Make sure your doctor knows all of the medicines, vitamins, herbs, and supplements you take. Taking some medicines at the same time can cause problems.  Where can you learn more?  Go to [email or web address]  Enter V615 in the search box to learn more about \"Learning About Rh Immunoglobulin Shots.\"  Current as of: July 10, 2023               Content Version: 14.0    4097-4340 [employer].   Care instructions adapted under license by your healthcare professional. If you have questions about a medical condition or this instruction, always ask your healthcare professional. [employer] disclaims any warranty or liability for your use of this information.      Rubella (Cro[place]ian Measles): Care Instructions  Overview  Rubella, also called Croatian measles or 3-day measles, is a disease caused by a virus. It spreads by coughs, sneezes, and close contact. Rubella usually is mild and does not cause long-term problems. But if you are pregnant and get it, you can give the disease to your unborn baby. This can cause serious birth defects.  While you have rubella, you may get a rash and a mild fever, and the lymph glands in your neck may swell. Older children often have a fever, eye pain, a sore throat, and body aches. You can relieve most symptoms with care at home. " Avoid being around others, especially pregnant people, until your rash has been gone for at least 4 days. People who have not had this disease before or have not had the vaccine have the greatest chance of getting the virus.  Follow-up care is a key part of your treatment and safety. Be sure to make and go to all appointments, and call your doctor if you are having problems. It's also a good idea to know your test results and keep a list of the medicines you take.  How can you care for yourself at home?  Drink plenty of fluids. If you have kidney, heart, or liver disease and have to limit fluids, talk with your doctor before you increase the amount of fluids you drink.  Get plenty of rest to help your body heal.  Take an over-the-counter pain medicine, such as acetaminophen (Tylenol), ibuprofen (Advil, Motrin), or naproxen (Aleve), to reduce fever and discomfort. Read and follow all instructions on the label. Do not give aspirin to anyone younger than 20. It has been linked to Reye syndrome, a serious illness.  Do not take two or more pain medicines at the same time unless the doctor told you to. Many pain medicines have acetaminophen, which is Tylenol. Too much acetaminophen (Tylenol) can be harmful.  Try not to scratch the rash. Put cold, wet cloths on the rash to reduce itching.  Do not smoke. Smoking can make your symptoms worse. If you need help quitting, talk to your doctor about stop-smoking programs and medicines. These can increase your chances of quitting for good.  Avoid contact with people who have never had rubella and who have not been immunized.  When should you call for help?   Call your doctor now or seek immediate medical care if:    You have a fever with a stiff neck or a severe headache.     You are sensitive to light or feel very sleepy or confused.   Watch closely for changes in your health, and be sure to contact your doctor if:    You do not get better as expected.   Where can you learn  "more?  Go to [email or web address]/patiented  Enter B812 in the search box to learn more about \"Rubella ([place]oven[place] Measles): Care Instructions.\"  Current as of: 2023               Content Version: 14.0    6749-0334 [employer].   Care instructions adapted under license by your healthcare professional. If you have questions about a medical condition or this instruction, always ask your healthcare professional. [employer] disclaims any warranty or liability for your use of this information.      Gonorrhea and Chlamydia: About These Tests  What is it?  These tests use a sample of urine or other body fluid to look for the bacteria that cause these sexually transmitted infections (STIs). The fluid sample can come from the cervix, vagina, rectum, throat, or eyes.  Why is this test done?  These tests may be done to:  Find out if symptoms are caused by gonorrhea or chlamydia.  Check people who are at high risk of being infected with gonorrhea or chlamydia.  Retest people several months after they have been treated for gonorrhea or chlamydia.  Check for infection in your  if you had a gonorrhea or chlamydia infection at the time of delivery.  How can you prepare for the test?  If you are going to have a urine test, do not urinate for at least 1 hour before the test.  If you think you may have chlamydia or gonorrhea, don't have sexual intercourse until you get your test results. And you may want to have tests for other STIs, such as HIV.  How is the test done?  For a direct sample, a swab is used to collect body fluid from the cervix, vagina, rectum, throat, or eyes. Your doctor may collect the sample. Or you may be given instructions on how to collect your own sample.  For a urine sample, you will collect the urine that comes out when you first start to urinate. Don't wipe the genital area clean before you urinate.  How long does the test take?  The test will take a few " "minutes.  What happens after the test?  You will be able to go home right away.  You can go back to your usual activities right away.  If you do have an infection, don't have sexual intercourse for 7 days after you start treatment. And your sex partner(s) should also be treated.  Follow-up care is a key part of your treatment and safety. Be sure to make and go to all appointments, and call your doctor if you are having problems. It's also a good idea to keep a list of the medicines you take. Ask your doctor when you can expect to have your test results.  Where can you learn more?  Go to [email or web address].net/[email or web address]  Enter K976 in the search box to learn more about \"Gonorrhea and Chlamydia: About These Tests.\"  Current as of: November 27, 2023               Content Version: 14.0    0205-8728 [employer].   Care instructions adapted under license by your healthcare professional. If you have questions about a medical condition or this instruction, always ask your healthcare professional. [employer] disclaims any warranty or liability for your use of this information.      Trichomoniasis: About This Test  What is it?     This test uses a sample of urine or other body fluid to look for the tiny parasite that causes trichomoniasis (also called trich). The fluid sample can come from the vagina, cervix, or urethra. Your doctor may choose to use one or more of many available tests.  Why is it done?  A trich test may be done to:  Find out if symptoms are caused by trich.  Check people who are at high risk for being infected with trich.  Check after treatment to make sure that the infection is gone.  How do you prepare for the test?  If you are going to have a urine test, do not urinate for at least 1 hour before the test.  How is the test done?  For a direct sample, a swab is used to collect body fluid from the cervix, vagina, or urethra. Your doctor may collect the sample. Or you may be " given instructions on how to collect your own sample.  For a urine sample, you will collect the urine that comes out when you first start to urinate. Don't wipe the area clean before you urinate.  How long does the test take?  It will take a few minutes to collect a sample.  What happens after the test?  You can go home right away.  You can go back to your usual activities right away.  You may get the test results the same day or several days later. It depends on the test used.  If you do have an infection, don't have sexual intercourse for 7 days after you start treatment. Your sex partner or partners should also be treated.  Follow-up care is a key part of your treatment and safety. Be sure to make and go to all appointments, and call your doctor if you are having problems. Ask your doctor when you can expect to have your test results.  Current as of: November 27, 2023               Content Version: 14.0    3330-0561 [employer].   Care instructions adapted under license by your healthcare professional. If you have questions about a medical condition or this instruction, always ask your healthcare professional. [employer] disclaims any warranty or liability for your use of this information.      HIV Testing: Care Instructions  Overview  You can get tested for the human immunodeficiency virus (HIV). Most doctors use a blood test to check for HIV antibodies and antigens in your blood. It may also check for the genetic material (RNA) of HIV. Some tests use saliva to check for HIV antibodies. But these aren't as accurate. For example, they may give a false result if you've just been infected.  What do the results mean?    Normal (negative)    No HIV antibodies, antigens, or RNA were found.  You may need more testing. It can make sure your test results are correct.    Uncertain (indeterminate)    Test results didn't clearly show if you have an HIV infection.  HIV antibodies or antigens may  "not have formed yet.  Some other type of antibody or antigen may have affected the results.  You will need another test to be sure.    Abnormal (positive)    HIV antibodies, antigens, or RNA were found.  If you haven't had an RNA test yet, one will be done. If it's positive, you have HIV.  If your test result is positive, your doctor will talk to you. You will discuss starting treatment.  Follow-up care is a key part of your treatment and safety. Be sure to make and go to all appointments, and call your doctor if you are having problems. It's also a good idea to know your test results and keep a list of the medicines you take.  Where can you learn more?  Go to [email or web address]/[email or web address]  Enter T792 in the search box to learn more about \"HIV Testing: Care Instructions.\"  Current as of: June 12, 2023               Content Version: 14.0    4755-9958 [employer].   Care instructions adapted under license by your healthcare professional. If you have questions about a medical condition or this instruction, always ask your healthcare professional. [employer] disclaims any warranty or liability for your use of this information.      Hepatitis C Virus Tests: About These Tests  What are they?     Hepatitis C virus tests are blood tests that check for substances in the blood that show whether you have hepatitis C now or had it in the past. The tests can also tell you what type of hepatitis C you have and how severe the disease is. This can help your doctor with treatment.  If the tests show that you have long-term hepatitis C, you need to take steps to prevent spreading the disease.  Why are these tests done?  You may need these tests if:  You have symptoms of hepatitis.  You may have been exposed to the virus. You are more likely to have been exposed to the virus if you inject drugs or are exposed to body fluids (such as if you are a health care worker).  You've had other tests that show " "you have liver problems.  You are 18 to 79 years old.  You have an HIV infection.  The tests also are done to help your doctor decide about your treatment and see how well it works.  How do you prepare for the test?  In general, there's nothing you have to do before this test, unless your doctor tells you to.  How is the test done?  A health professional uses a needle to take a blood sample, usually from the arm.  What happens after these tests?  You will probably be able to go home right away.  You can go back to your usual activities right away.  Follow-up care is a key part of your treatment and safety. Be sure to make and go to all appointments, and call your doctor if you are having problems. It's also a good idea to keep a list of the medicines you take. Ask your doctor when you can expect to have your test results.  Where can you learn more?  Go to [email or web address]/[email or web address]  Enter W551 in the search box to learn more about \"Hepatitis C Virus Tests: About These Tests.\"  Current as of: June 12, 2023               Content Version: 14.0    8336-5034 [employer].   Care instructions adapted under license by your healthcare professional. If you have questions about a medical condition or this instruction, always ask your healthcare professional. [employer] disclaims any warranty or liability for your use of this information.      Learning About Fetal Ultrasound Results  What is a fetal ultrasound?     Fetal ultrasound is a test that lets your doctor see an image of your baby. Your doctor learns information about your baby from this picture. You may find out, for example, if you are having a boy or a girl. But the main reason you have this test is to get information about your baby's health.  (You may hear your baby called a fetus. This is a common medical term for a baby that's growing in the mother's uterus.)  What kind of information can you learn from this test?  The " findings of an ultrasound fall into two categories, normal and abnormal.  Normal  The fetus is the right size for its age.  The placenta is the expected size and does not cover the cervix.  There is enough amniotic fluid in the uterus.  No birth defects can be seen.  Abnormal  The fetus is small or large for its age.  The placenta covers the cervix.  There is too much or too little amniotic fluid in the uterus.  The fetus may have a birth defect.  What does an abnormal result mean?  Abnormal seems to imply that something is wrong with your baby. But what it means is that the test has shown something the doctor wants to take a closer look at.  And that's what happens next. Your doctor will talk to you about what further test or tests you may need.  What do the results mean?  Some of the things your doctor may see on an abnormal ultrasound include:  Echogenic bowel.  The bowel looks very bright on the screen. This could mean that there's blood in the bowel. Or it could mean that something is blocking the small bowel.  Increased nuchal translucency.  The ultrasound measures the thickness at the back of the baby's neck. An increase in thickness is sometimes an early sign of Down syndrome.  Increased or decreased amniotic fluid.  The doctor will look for a reason for the level of amniotic fluid and will watch the pregnancy closely as it progresses.  Large ventricles.  Ventricles in the brain look larger than they should. Your doctor may take a closer look at the brain.  Renal pyelectasis/hydronephrosis.  The ultrasound measures the fluid around the kidney. If there is more fluid than expected, there is a chance of urinary tract or kidney problems.  Short long bones.  The ultrasound measures certain arm and leg bones. A long bone (humerus or femur) that is shorter than average could be a sign of Down syndrome.  Subchorionic hemorrhage.  An ultrasound can show bleeding under one of the membranes that surrounds the fetus.  "Some women don't have symptoms of bleeding. The ultrasound can find this problem when women are not bleeding from their vagina. Women who have this condition have a slightly higher chance of miscarriage.  What do you do now?  Take a deep breath, and let it out. Keep in mind that an abnormal finding on an ultrasound, after it's coupled with more information, may:  Turn out to be nothing.  Turn out to be something mild that won't affect the baby.  Turn out to be something more serious. But if this happens, early diagnosis helps you and your doctor plan treatment options sooner rather than later.  Your medical team is there for you. So are your family and friends. Ask questions, and get the help and support you need.  Follow-up care is a key part of your treatment and safety. Be sure to make and go to all appointments, and call your doctor if you are having problems. It's also a good idea to know your test results and keep a list of the medicines you take.  Where can you learn more?  Go to [email or web address].[employer].net/patiented  Enter K451 in the search box to learn more about \"Learning About Fetal Ultrasound Results.\"  Current as of: July 10, 2023               Content Version: 14.0    4548-2422 [employer].   Care instructions adapted under license by your healthcare professional. If you have questions about a medical condition or this instruction, always ask your healthcare professional. [employer] disclaims any warranty or liability for your use of this information.      Learning About Prenatal Visits  Overview     Regular prenatal visits are very important during any pregnancy. These quick office visits may seem simple and routine. But they can help you have a safe and healthy pregnancy. Your doctor is watching for problems that can only be found through regular checkups. The visits also give you and your doctor time to build a good relationship.  After your first visit, you will most " likely start on a schedule of monthly visits. In your third trimester, the visits will get more frequent. Based on your health, your age, and if you've had a normal, full-term pregnancy before, your doctor may want to see you more or less often.  At different times in your pregnancy, you will have exams and tests. Some are routine. Others are done only when there is a chance of a problem. Everything healthy you do for your body helps you have a healthy pregnancy. Rest when you need it. Eat well, drink plenty of water, and exercise regularly.  What happens during a prenatal visit?  You will have blood pressure checks, along with urine tests. You also may have blood tests. If you need to go to the bathroom while waiting for the doctor, tell the nurse. You will be given a sample cup so your urine can be tested.  You will be weighed and have your belly measured.  Your doctor may listen to the fetal heartbeat with a special device.  At about 24 weeks, and possibly earlier in your pregnancy, your doctor will check your blood sugar (glucose tolerance test) for diabetes that can occur during pregnancy. This is gestational diabetes, which can be harmful.  You will have tests to check for infections that could harm your . These include group B streptococcus and hepatitis B.  Your doctor may do ultrasounds to check for problems. This also checks the position of the fetus. An ultrasound uses sound waves to produce a picture of the fetus.  You may get your vaccines updated.  Your doctor may ask you questions to check for signs of anxiety or depression. Tell your doctor if you feel sad, anxious, or hopeless for more than a few days.  You may have other tests at any time during your pregnancy.  Use your visits to discuss with your doctor any concerns you have.  How can you care for yourself at home?  Get plenty of rest.  Try to exercise every day, if your doctor says it is okay. If you have not exercised in the past, start  "out slowly. For example, you can take short walks each day.  Choose healthy foods, such as fruits, vegetables, whole grains, lean proteins, low-fat dairy, and healthy fats.  Drink plenty of fluids. Cut down on drinks with caffeine, such as coffee, tea, and cola. If you have kidney, heart, or liver disease and have to limit fluids, talk with your doctor before you increase the amount of fluids you drink.  Try to avoid chemical fumes, paint fumes, and poisons.  If you smoke, vape, or use alcohol, marijuana, or other drugs, quit or cut back as much as you can. Talk to your doctor if you need help quitting.  Review all of your medicines, including over-the-counter medicines and supplements, with your doctor. Some of your routine medicines may need to be changed. Do not stop or start taking any medicines without talking to your doctor first.  Follow-up care is a key part of your treatment and safety. Be sure to make and go to all appointments, and call your doctor if you are having problems. It's also a good idea to know your test results and keep a list of the medicines you take.  Where can you learn more?  Go to [email or web address]/patiented  Enter J502 in the search box to learn more about \"Learning About Prenatal Visits.\"  Current as of: July 10, 2023               Content Version: 14.0    0433-7392 [employer].   Care instructions adapted under license by your healthcare professional. If you have questions about a medical condition or this instruction, always ask your healthcare professional. [employer] disclaims any warranty or liability for your use of this information.      Intimate Partner Violence: Care Instructions  Overview     If you want to save this information but don't think it is safe to take it home, see if a trusted friend can keep it for you. Plan ahead. Know who you can call for help, and memorize the phone number.   Be careful online too. Your online activity may " be seen by others. Do not use your personal computer or device to read about this topic. Use a safe computer, such as one at work, a friend's home, or a library.    Intimate partner violence--a type of domestic abuse--is different from an argument now and then. It is a pattern of abuse that one person may use to control another person's behavior. It may start with threats and name-calling. Then, it may lead to more serious acts, like pushing and slapping. The abuse also may occur in other areas. For example, the abuser may withhold money or spend a partner's money without their knowledge.  Abuse can cause serious harm. You are more likely to have a long-term health problem from the injuries and stress of living in a violent relationship. People who are sexually abused by their partners have more sexually transmitted infections and unplanned pregnancies. Anyone who is abused also faces emotional pain. Anyone can be abused in relationships. In some relationships, both people use abusive behavior.  If you are pregnant, abuse can cause problems such as poor weight gain, infections, and bleeding. Abuse during this time may increase your baby's risk of low birth weight, premature birth, and death.  Follow-up care is a key part of your treatment and safety. Be sure to make and go to all appointments, and call your doctor if you are having problems. It's also a good idea to know your test results and keep a list of the medicines you take.  How can you care for yourself at home?  If you do not have a safe place to stay, discuss this with your doctor before you leave.  Have a plan for where to go, how to leave your home, and where to stay in case of an emergency. Do not tell your partner about your plan. Contact:  The National Domestic Violence Hotline toll-free at 1-226.102.1813. They can help you find resources in your area.  Your local police department, hospital, or clinic for information about shelters and safe homes  "near you.  Talk to a trusted friend or neighbor, a counselor, or a fanny leader. Do not feel that you have to hide what happened.  Teach your children how to call for help in an emergency.  Be alert to warning signs, such as threats, heavy alcohol use, or drug use. This can help you avoid danger.  If you can, make sure that there are no guns or other weapons in your home.  When should you call for help?   Call 911 anytime you think you may need emergency care. For example, call if:    You or someone else has just been abused.     You think you or someone else is in danger of being abused.   Watch closely for changes in your health, and be sure to contact your doctor if you have any problems.  Where can you learn more?  Go to https://www.[employer].net/patiented  Enter G282 in the search box to learn more about \"Intimate Partner Violence: Care Instructions.\"  Current as of: June 24, 2023               Content Version: 14.0    1047-4885 [employer].   Care instructions adapted under license by your healthcare professional. If you have questions about a medical condition or this instruction, always ask your healthcare professional. [employer] disclaims any warranty or liability for your use of this information.      Intimate Partner Violence Safety Instructions: Care Instructions  Overview     If you want to save this information but don't think it is safe to take it home, see if a trusted friend can keep it for you. Plan ahead. Know who you can call for help, and memorize the phone number.   Be careful online too. Your online activity may be seen by others. Do not use your personal computer or device to read about this topic. Use a safe computer, such as one at work, a friend's home, or a library.    When you are abused by a spouse or partner, you can take actions to protect yourself and your children.  You can increase your safety whether you decide to stay with your spouse or partner or " you decide to leave. You may want to make a safety plan and pack a bag ahead of time. This will help you leave quickly when you decide to. Remember, you cannot change your partner's actions, but you can find help for you and your children. No one deserves to be abused.  Follow-up care is a key part of your treatment and safety. Be sure to make and go to all appointments, and call your doctor if you are having problems. It's also a good idea to know your test results and keep a list of the medicines you take.  How can you care for yourself at home?  Make a plan for your safety   If you decide to stay with your abusive spouse or partner, you can do the following to increase your safety:  Decide what works best to keep you safe in an emergency.  Know who you can call to help you in an emergency.  Decide if you will call the police if you get hurt again. If you can, agree on a signal with your children or neighbor to call the police for you if you need help. You can flash lights or hang something out of a window.  Choose a safe place to go for a short time if you need to leave home. Memorize the address and phone number.  Learn escape routes out of your home in case you need to leave in a hurry. Teach your children different ways to get out of your home quickly if they need to.  If you can, hide or lock up things that can be used as weapons (guns, knives, hammers).  Learn the number of a domestic violence shelter. Talk to the people there about how they can help.  Find out about other community resources that can help you.  Take pictures of bruises or other injuries if you can. You can also take pictures of things your abuser has broken.  Teach your children that violence is never okay. Tell them that they do not deserve to be hurt.  Pack a bag   Prepare a bag with things you will need if you leave suddenly. Leave it with a friend, a relative, or someone else you trust. You could include the following items in the  "bag:  A set of keys to your home and car.  Emergency phone numbers and addresses.  Money such as cash or checks. You can also ask a friend, a relative, or someone else you trust to hold money for you.  Copies of legal documents such as house and car titles or rent receipts, birth certificates, Social Security card, voter registration, marriage and 's licenses, and your children's health records.  Personal items you would need for a few days, such as clothes, a toothbrush, toothpaste, and any medicines you or your children need.  A favorite toy or book for your child or children.  Diapers and bottles, if you have very young children.  Pictures that show signs of abuse and violence. You may also add pictures of your abuser.  If you leave   If you decide to leave, you can take the following steps:  Go to the emergency room at a hospital if you have been hurt.  Think about asking the police to be with you as you leave. They can protect you as you leave your home.  If you decide to leave secretly, remember that activities can be tracked. Your abuser may still have access to your cell phone, email, and credit cards. It may be possible for these to be traced. Always be aware of your surroundings.  If this is an emergency, do not worry about gathering up anything. Just leave--your safety is most important.  If your abuser moves out, change the locks on the doors. If you have a security system, change the access code.  When should you call for help?   Call 911 anytime you think you may need emergency care. For example, call if:    You or someone else has just been abused.     You think you or someone else is in danger of being abused.   Watch closely for changes in your health, and be sure to contact your doctor if you have any problems.  Where can you learn more?  Go to https://www.healthwise.net/patiented  Enter A752 in the search box to learn more about \"Intimate Partner Violence Safety Instructions: Care " "Instructions.\"  Current as of: June 24, 2023               Content Version: 14.0    9669-8207 [employer].   Care instructions adapted under license by your healthcare professional. If you have questions about a medical condition or this instruction, always ask your healthcare professional. [employer] disclaims any warranty or liability for your use of this information.      Learning About Intimate Partner Violence  What is intimate partner violence?  Intimate partner violence is a type of domestic abuse. It's threatening, emotionally harmful, or violent behavior in a personal relationship. It can happen between past or current partners or spouses. In some relationships both people abuse each other. One partner may be more abusive. Or the abuse may be equal.  Abuse can affect people of any ethnic group, race, or Sikh. It can affect teens, adults, or the elderly. And it can happen to people of any sexual orientation, gender, or social status.  Abusers use fear, bullying, and threats to control their partners. They may control what their partners do. They may control where their partners go or who they see. They may act jealous, controlling, or possessive. These early signs of abuse may happen soon after the start of the relationship. Sometimes it can be hard to notice abuse at first. But after the relationship becomes more serious, the abuse may get worse.  If you are being abused in your relationship, it's important to get help. The abuse is not your fault. You don't have to face it alone.  Be careful  It may not be safe to take home domestic abuse information like this handout. Some people ask a trusted friend to keep it for them. It's also important to plan ahead and to memorize the phone number of places you can go for help. If you are concerned about your safety, do not use your computer, smartphone, or tablet to read about domestic abuse.   What are the types of intimate partner " violence?  Abuse can happen in different ways. Each type can happen on its own or in combination with others.  Emotional abuse  Emotional abuse is a pattern of threats, insults, or controlling behavior. It includes verbal abuse. It goes beyond healthy disagreements in a relationship. It's a sign of an unhealthy relationship.  Do you feel threatened, intimidated, or controlled?  Does your partner:  Threaten your children, other family members, or pets?  Use jokes meant to embarrass or shame you?  Call you names?  Tell you that you are a bad parent?  Threaten to take away your children?  Threaten to have you or your family members deported?  Control your access to money or other basic needs?  Control what you do, who you see or talk to, or where you go?  Another form of emotional abuse is denying that it is happening. Or the abuser may act like the abuse is no big deal or is your fault.  Sexual abuse  With sexual abuse, abusers may try to convince or force you to have sex. They may force you into sex acts you're not comfortable with. Or they may sexually assault you. Sexual abuse can happen even if you are in a committed relationship.  Physical abuse  Physical abuse means that a partner hits, kicks, or does something else to physically hurt you. Physical abuse that starts with a slap might lead to kicking, shoving, and choking over time. The abuser may also threaten to hurt or kill you.  Stalking  Stalking means that an abuser gives you attention that you do not want and that causes you fear. Examples of stalking include:  Following you.  Showing up at places where the abuser isn't invited, such as at your work or school.  Constantly calling or texting you.  What problems can  to?  Intimate partner violence can be very dangerous. It can cause serious, repeated injury. It can even lead to death.  All forms of abuse can cause long-term health problems from the stress of a violent relationship. Verbal abuse can  "lead to sexual and physical abuse.  Abuse causes:  Emotional pain.  Depression.  Anxiety.  Post-traumatic stress.  Sexual abuse can lead to sexually transmitted infections (such as HIV/AIDS) and unplanned pregnancy.  Pregnancy can be a very dangerous time for people in abusive relationships. Abuse can cause or increase the risk of problems during pregnancy. These include low weight gain, anemia, infections, and bleeding. Abuse may also increase your baby's risk of low birth weight, premature birth, and death.  It can be hard for some victims of abuse to ask for help or to leave their relationship. You may feel scared, stuck, or not sure what steps to take. But it's important not to ignore abuse. Talking to someone you trust could be the first step to ending the abuse and taking care of your own health and happiness again. There are resources available that can help keep you safe.  Where can you get help?  Talk to a trusted friend. Find a local advocacy group, or talk to your doctor about the abuse.  Contact the National Domestic Violence Hotline at 8-898-178-SWML (1-775.284.4681) for more safety tips. They can guide you to groups in your area that can help. Or go to the National Coalition Against Domestic Violence website at www.thehotlDECA.org to learn more.  Domestic violence groups or a counselor in your area can help you make a safety plan for yourself and your children.  When to call for help  Call 911 anytime you think you may need emergency care. For example, call if:  You think that you or someone you know is in danger of being abused.  You have been hurt and can't have someone safely take you to emergency care.  You have just been abused.  A family member has just been abused.  Where can you learn more?  Go to https://www.healthwise.net/patiented  Enter S665 in the search box to learn more about \"Learning About Intimate Partner Violence.\"  Current as of: June 24, 2023               Content Version: 14.0    " 9724-4664 [employer].   Care instructions adapted under license by your healthcare professional. If you have questions about a medical condition or this instruction, always ask your healthcare professional. [employer] disclaims any warranty or liability for your use of this information.      Vaginal Bleeding During Pregnancy: Care Instructions  Overview     It's common to have some vaginal spotting when you are pregnant. In some cases, the bleeding isn't serious. And there aren't any more problems with the pregnancy.  But sometimes bleeding is a sign of a more serious problem. This is more common if the bleeding is heavy or painful. Examples of more serious problems include miscarriage, an ectopic pregnancy, and a problem with the placenta.  You may have to see your doctor again to be sure everything is okay. You may also need more tests to find the cause of the bleeding.  Home treatment may be all you need. But it depends on what is causing the bleeding. Be sure to tell your doctor if you have any new symptoms or if your symptoms get worse.  The doctor has checked you carefully, but problems can develop later. If you notice any problems or new symptoms, get medical treatment right away.  Follow-up care is a key part of your treatment and safety. Be sure to make and go to all appointments, and call your doctor if you are having problems. It's also a good idea to know your test results and keep a list of the medicines you take.  How can you care for yourself at home?  If your doctor prescribed medicines, take them exactly as directed. Call your doctor if you think you are having a problem with your medicine.  Do not have vaginal sex until your doctor says it's okay.  Do not put anything in your vagina until your doctor says it's okay.  Ask your doctor about other activities you can or can't do.  Get a lot of rest. Being pregnant can make you tired.  Do not use nonsteroidal  "anti-inflammatory drugs (NSAIDs), such as ibuprofen (Advil, Motrin), naproxen (Aleve), or aspirin, unless your doctor says it is okay.  When should you call for help?   Call 911 anytime you think you may need emergency care. For example, call if:    You passed out (lost consciousness).     You have severe vaginal bleeding. This means you are soaking through a pad each hour for 2 or more hours.     You have sudden, severe pain in your belly or pelvis.   Call your doctor now or seek immediate medical care if:    You have new or worse vaginal bleeding.     You are dizzy or lightheaded, or you feel like you may faint.     You have pain in your belly, pelvis, or lower back.     You think that you are in labor.     You have a sudden release of fluid from your vagina.     You've been having regular contractions for an hour. This means that you've had at least 8 contractions within 1 hour or at least 4 contractions within 20 minutes, even after you change your position and drink fluids.     You notice that your baby has stopped moving or is moving much less than normal.   Watch closely for changes in your health, and be sure to contact your doctor if you have any problems.  Where can you learn more?  Go to https://www.[employer].net/patiented  Enter N829 in the search box to learn more about \"Vaginal Bleeding During Pregnancy: Care Instructions.\"  Current as of: July 10, 2023               Content Version: 14.0    3867-6855 [employer].   Care instructions adapted under license by your healthcare professional. If you have questions about a medical condition or this instruction, always ask your healthcare professional. [employer] disclaims any warranty or liability for your use of this information.      "

## 2024-04-16 ENCOUNTER — TELEPHONE (OUTPATIENT)
Dept: OBGYN | Facility: CLINIC | Age: 29
End: 2024-04-16
Payer: COMMERCIAL

## 2024-04-16 DIAGNOSIS — O21.9 NAUSEA AND VOMITING IN PREGNANCY: Primary | ICD-10-CM

## 2024-04-16 RX ORDER — METOCLOPRAMIDE 5 MG/1
10 TABLET ORAL 4 TIMES DAILY PRN
Qty: 60 TABLET | Refills: 1 | Status: SHIPPED | OUTPATIENT
Start: 2024-04-16 | End: 2024-05-24

## 2024-04-16 NOTE — TELEPHONE ENCOUNTER
Received call from patient via red-flag line for concerns of sciatica and a request for additional anti-nausea medications. Reviewed signs of sciatica (pain starts in left buttocks and travels to foot) and conservative interventions are recommended initially (such as stretching).     Patient also requested an additional anti-nausea medication as the Zofran does not seem to be controlling her nausea. She also reports it is causing constipation that has resulted in bleeding form an anal fissure (per patient).

## 2024-04-16 NOTE — TELEPHONE ENCOUNTER
Caller reporting the following red-flag symptom(s): severe vomiting and pain in leg    Per the system red-flag symptom policy, patient was instructed to:  speak with a Registered Nurse    Action:  Patient warm transferred to a Registered Nurse

## 2024-04-24 LAB
ABO/RH(D): NORMAL
ANTIBODY SCREEN: NEGATIVE
SPECIMEN EXPIRATION DATE: NORMAL

## 2024-04-25 ENCOUNTER — PRENATAL OFFICE VISIT (OUTPATIENT)
Dept: OBGYN | Facility: CLINIC | Age: 29
End: 2024-04-25
Attending: FAMILY MEDICINE
Payer: COMMERCIAL

## 2024-04-25 ENCOUNTER — LAB (OUTPATIENT)
Dept: LAB | Facility: CLINIC | Age: 29
End: 2024-04-25
Attending: FAMILY MEDICINE
Payer: COMMERCIAL

## 2024-04-25 ENCOUNTER — TRANSCRIBE ORDERS (OUTPATIENT)
Dept: MATERNAL FETAL MEDICINE | Facility: CLINIC | Age: 29
End: 2024-04-25
Payer: COMMERCIAL

## 2024-04-25 ENCOUNTER — ANCILLARY PROCEDURE (OUTPATIENT)
Dept: ULTRASOUND IMAGING | Facility: CLINIC | Age: 29
End: 2024-04-25
Attending: FAMILY MEDICINE
Payer: COMMERCIAL

## 2024-04-25 VITALS
DIASTOLIC BLOOD PRESSURE: 70 MMHG | HEIGHT: 66 IN | HEART RATE: 81 BPM | BODY MASS INDEX: 26.37 KG/M2 | WEIGHT: 164.1 LBS | SYSTOLIC BLOOD PRESSURE: 98 MMHG

## 2024-04-25 DIAGNOSIS — E03.9 HYPOTHYROIDISM (ACQUIRED): ICD-10-CM

## 2024-04-25 DIAGNOSIS — Z34.81 ENCOUNTER FOR SUPERVISION OF OTHER NORMAL PREGNANCY IN FIRST TRIMESTER: Primary | ICD-10-CM

## 2024-04-25 DIAGNOSIS — Z86.59 HX OF POSTPARTUM DEPRESSION, CURRENTLY PREGNANT: ICD-10-CM

## 2024-04-25 DIAGNOSIS — Z34.81 ENCOUNTER FOR SUPERVISION OF OTHER NORMAL PREGNANCY IN FIRST TRIMESTER: ICD-10-CM

## 2024-04-25 DIAGNOSIS — O99.891 HX OF POSTPARTUM DEPRESSION, CURRENTLY PREGNANT: ICD-10-CM

## 2024-04-25 DIAGNOSIS — O26.90 PREGNANCY RELATED CONDITION, ANTEPARTUM: Primary | ICD-10-CM

## 2024-04-25 DIAGNOSIS — Z32.01 PREGNANCY TEST POSITIVE: ICD-10-CM

## 2024-04-25 DIAGNOSIS — K60.2 RECTAL FISSURE: ICD-10-CM

## 2024-04-25 LAB
ERYTHROCYTE [DISTWIDTH] IN BLOOD BY AUTOMATED COUNT: 13.8 % (ref 10–15)
HBA1C MFR BLD: 5.6 %
HCT VFR BLD AUTO: 40.4 % (ref 35–47)
HGB BLD-MCNC: 13.1 G/DL (ref 11.7–15.7)
MCH RBC QN AUTO: 27.9 PG (ref 26.5–33)
MCHC RBC AUTO-ENTMCNC: 32.4 G/DL (ref 31.5–36.5)
MCV RBC AUTO: 86 FL (ref 78–100)
PLATELET # BLD AUTO: 202 10E3/UL (ref 150–450)
RBC # BLD AUTO: 4.69 10E6/UL (ref 3.8–5.2)
RUBV IGG SERPL QL IA: 21.1 INDEX
RUBV IGG SERPL QL IA: POSITIVE
T PALLIDUM AB SER QL: NONREACTIVE
TSH SERPL DL<=0.005 MIU/L-ACNC: 3.89 UIU/ML (ref 0.3–4.2)
VZV IGG SER QL IA: 1693 INDEX
VZV IGG SER QL IA: POSITIVE
WBC # BLD AUTO: 6.3 10E3/UL (ref 4–11)

## 2024-04-25 PROCEDURE — 86803 HEPATITIS C AB TEST: CPT

## 2024-04-25 PROCEDURE — 82306 VITAMIN D 25 HYDROXY: CPT

## 2024-04-25 PROCEDURE — 86780 TREPONEMA PALLIDUM: CPT

## 2024-04-25 PROCEDURE — 87591 N.GONORRHOEAE DNA AMP PROB: CPT | Performed by: ADVANCED PRACTICE MIDWIFE

## 2024-04-25 PROCEDURE — 86787 VARICELLA-ZOSTER ANTIBODY: CPT

## 2024-04-25 PROCEDURE — 87340 HEPATITIS B SURFACE AG IA: CPT

## 2024-04-25 PROCEDURE — 87389 HIV-1 AG W/HIV-1&-2 AB AG IA: CPT

## 2024-04-25 PROCEDURE — 83036 HEMOGLOBIN GLYCOSYLATED A1C: CPT

## 2024-04-25 PROCEDURE — 84439 ASSAY OF FREE THYROXINE: CPT

## 2024-04-25 PROCEDURE — 36415 COLL VENOUS BLD VENIPUNCTURE: CPT

## 2024-04-25 PROCEDURE — 86706 HEP B SURFACE ANTIBODY: CPT

## 2024-04-25 PROCEDURE — 87491 CHLMYD TRACH DNA AMP PROBE: CPT | Performed by: ADVANCED PRACTICE MIDWIFE

## 2024-04-25 PROCEDURE — 86762 RUBELLA ANTIBODY: CPT

## 2024-04-25 PROCEDURE — 76801 OB US < 14 WKS SINGLE FETUS: CPT

## 2024-04-25 PROCEDURE — 85041 AUTOMATED RBC COUNT: CPT

## 2024-04-25 PROCEDURE — 87086 URINE CULTURE/COLONY COUNT: CPT | Performed by: ADVANCED PRACTICE MIDWIFE

## 2024-04-25 PROCEDURE — 84443 ASSAY THYROID STIM HORMONE: CPT

## 2024-04-25 PROCEDURE — 76801 OB US < 14 WKS SINGLE FETUS: CPT | Mod: 26 | Performed by: OBSTETRICS & GYNECOLOGY

## 2024-04-25 PROCEDURE — 99215 OFFICE O/P EST HI 40 MIN: CPT | Mod: 25 | Performed by: ADVANCED PRACTICE MIDWIFE

## 2024-04-25 PROCEDURE — 99207 PR PRENATAL VISIT: CPT | Performed by: ADVANCED PRACTICE MIDWIFE

## 2024-04-25 PROCEDURE — 86900 BLOOD TYPING SEROLOGIC ABO: CPT

## 2024-04-25 RX ORDER — CIPROFLOXACIN AND DEXAMETHASONE 3; 1 MG/ML; MG/ML
SUSPENSION/ DROPS AURICULAR (OTIC)
COMMUNITY
Start: 2024-04-18 | End: 2024-07-17

## 2024-04-25 ASSESSMENT — ANXIETY QUESTIONNAIRES
1. FEELING NERVOUS, ANXIOUS, OR ON EDGE: NOT AT ALL
6. BECOMING EASILY ANNOYED OR IRRITABLE: SEVERAL DAYS
GAD7 TOTAL SCORE: 1
2. NOT BEING ABLE TO STOP OR CONTROL WORRYING: NOT AT ALL
3. WORRYING TOO MUCH ABOUT DIFFERENT THINGS: NOT AT ALL
5. BEING SO RESTLESS THAT IT IS HARD TO SIT STILL: NOT AT ALL
7. FEELING AFRAID AS IF SOMETHING AWFUL MIGHT HAPPEN: NOT AT ALL
GAD7 TOTAL SCORE: 1

## 2024-04-25 ASSESSMENT — PATIENT HEALTH QUESTIONNAIRE - PHQ9
SUM OF ALL RESPONSES TO PHQ QUESTIONS 1-9: 2
5. POOR APPETITE OR OVEREATING: NOT AT ALL

## 2024-04-25 NOTE — PATIENT INSTRUCTIONS
Thank you for trusting us with your care!     If you need to contact us for questions about:  Symptoms, Scheduling & Medical Questions; Non-urgent (2-3 day response) Risa message, Urgent (needing response today) [phone] (if after 3:30pm next day response)   Prescriptions: Please call your Pharmacy   Billing: Chris [phone] or [facility]:[phone]

## 2024-04-25 NOTE — PROGRESS NOTES
S Provider New OB Note    Reviewed RN intake note.    Cathy is a 29 year old female who presents to clinic for a new OB visit.   at 10w1d with Estimated Date of Delivery: 2024 based on LMP and ultrasound. Feels well. Has started PNV.   She has not had bleeding since her LMP.   She has had nausea resulting in non-bilious Weight loss has not occurred. She currently has zofran if needed, however it has been making her constipated. Is not taking reglan currently, the taste of it makes her vomit.   This was a planned pregnancy.   Partner is involved,  Franky   OTHER CONCERNS: ear infection- antibiotic, 24, improving    Has no breast or vaginal concerns. Reports breast tenderness.    PSYCHIATRIC:  Denies mood changes.  Has History of post partum depression, had been on Lexapro 20mg. Is not currently on medications at this time.    PHQ-9 score:        2024     2:46 PM   PHQ-9 SCORE   PHQ-9 Total Score 2             2024     2:46 PM   DAVID-7 SCORE   Total Score 1       Past History:  Her past medical history   Past Medical History:   Diagnosis Date    Hypothyroidism (acquired) 2024    Post partum depression 2024    was on Lexapro, weaned from 20 mg to 5 mg and ended     Rectal fissure 2024   .   Her past pregnancies have been uncomplicated  Since her last LMP she denies use of alcohol, tobacco and street drugs.  HISTORY:  Family History   Problem Relation Age of Onset    No Known Problems Mother     No Known Problems Father     No Known Problems Sister     No Known Problems Brother     No Known Problems Brother     No Known Problems Maternal Grandmother         unknown    No Known Problems Maternal Grandfather         unknown    No Known Problems Paternal Grandmother         unknown    No Known Problems Paternal Grandfather         unknown     Social History     Socioeconomic History    Marital status:      Spouse name: Braydon    Number of children: 1    Years of  education: None    Highest education level: None   Tobacco Use    Smoking status: Never    Smokeless tobacco: Never   Substance and Sexual Activity    Alcohol use: Never    Drug use: Never    Sexual activity: Yes     Partners: Male     Social Determinants of Health     Financial Resource Strain: Low Risk  (4/4/2024)    Financial Resource Strain     Within the past 12 months, have you or your family members you live with been unable to get utilities (heat, electricity) when it was really needed?: No   Food Insecurity: Low Risk  (4/4/2024)    Food Insecurity     Within the past 12 months, did you worry that your food would run out before you got money to buy more?: No     Within the past 12 months, did the food you bought just not last and you didn t have money to get more?: No   Transportation Needs: Low Risk  (4/4/2024)    Transportation Needs     Within the past 12 months, has lack of transportation kept you from medical appointments, getting your medicines, non-medical meetings or appointments, work, or from getting things that you need?: No   Housing Stability: Low Risk  (4/4/2024)    Housing Stability     Do you have housing? : Yes     Are you worried about losing your housing?: No     Current Outpatient Medications   Medication Sig Dispense Refill    acetaminophen (TYLENOL) 325 MG tablet Take 325-650 mg by mouth every 6 hours as needed for mild pain      amoxicillin-clavulanate (AUGMENTIN) 875-125 MG tablet Take 1 tablet by mouth 2 times daily      ciprofloxacin-dexAMETHasone (CIPRODEX) 0.3-0.1 % otic suspension INSTILL 4 DROPS INTO LEFT EAR TWICE A DAY FOR 7 DAYS      doxylamine (UNISOM) 25 MG TABS tablet Take 20 mg by mouth at bedtime      famotidine (PEPCID) 20 MG tablet Take 20 mg by mouth 2 times daily      levothyroxine (SYNTHROID/LEVOTHROID) 75 MCG tablet Take 75 mcg by mouth daily      metoclopramide (REGLAN) 5 MG tablet Take 2 tablets (10 mg) by mouth 4 times daily as needed (nasuea/vomiting in  "pregnancy) 60 tablet 1    ondansetron (ZOFRAN ODT) 4 MG ODT tab Take 1 tablet by mouth 3 times daily      Prenatal Vit-Fe Fumarate-FA (PRENATAL PLUS) 27-1 MG TABS Take 1 tablet by mouth daily       No current facility-administered medications for this visit.     No Known Allergies    ============================================  MEDICAL HISTORY  Past Medical History:   Diagnosis Date    Hypothyroidism (acquired) 2024    Post partum depression 2024    was on Lexapro, weaned from 20 mg to 5 mg and ended     Rectal fissure 2024     Past Surgical History:   Procedure Laterality Date    APPENDECTOMY         OB History    Para Term  AB Living   2 1 0 1 0 1   SAB IAB Ectopic Multiple Live Births   0 0 0 0 1      # Outcome Date GA Lbr Raymond/2nd Weight Sex Type Anes PTL Lv   2 Current            1  21 36w4d   M Vag-Spont EPI  CARMEN       Reviewed genetic history form     Last pap patient recalls was  postpartum in [place]  GYN History- No Abnormal Pap Smears, per patient                        Cervical procedures: None                        History of STI: None    I personally reviewed the past social/family/medical and surgical history on the date of service.     OBJECTIVE:  BP 98/70   Pulse 81   Ht 1.683 m (5' 6.26\")   Wt 74.4 kg (164 lb 1.6 oz)   LMP 2024 (Exact Date)   BMI 26.28 kg/m    ROS: 10 point ROS neg other than the symptoms noted above in the HPI.    EXAM:  BP 98/70   Pulse 81   Ht 1.683 m (5' 6.26\")   Wt 74.4 kg (164 lb 1.6 oz)   LMP 2024 (Exact Date)   BMI 26.28 kg/m     EXAM:  GENERAL:  Pleasant pregnant female, alert, cooperative  and well groomed.  NECK:  Thyroid without enlargement and nodules.    LUNGS:  Clear to auscultation.  BREAST:  deferred  HEART:  RRR without murmur.  ABDOMEN: Soft without masses , tenderness or organomegaly.    PELVIC EXAM: deferred    No results found for: \"PAP\"     ASSESSMENT:  29 year old , 10w1d " weeks of pregnancy with VALENTINA of Nov 20, 2024 by LMP, and first trimester ultrasound confirms  Genetic Screening: First Trimester Screen    Patient Active Problem List    Diagnosis Date Noted    Encounter for supervision of other normal pregnancy in first trimester 04/25/2024     Priority: Medium    Hypothyroidism (acquired) 03/20/2024     Priority: Medium    Post partum depression 03/20/2024     Priority: Medium    Rectal fissure 03/20/2024     Priority: Medium         ICD-10-CM    1. Encounter for supervision of other normal pregnancy in first trimester  Z34.81 ABO/Rh type and screen     Rubella Antibody IgG     Hepatitis B Surface Antibody     Hepatitis B surface antigen     Hepatitis C antibody     Vitamin D Deficiency     HIV Antigen Antibody Combo Cascade     CBC with platelets     Treponema Abs w Reflex to RPR and Titer     Varicella Zoster Virus Antibody IgG     Hemoglobin A1c     Chlamydia trachomatis PCR     Neisseria gonorrhoeae PCR     Urine Culture     TSH with free T4 reflex     [facility] Referral - Pregnancy      2. Hypothyroidism (acquired)  E03.9 TSH with free T4 reflex     [facility] Referral - Pregnancy          Aiman was seen today for prenatal care.    Diagnoses and all orders for this visit:    Encounter for supervision of other normal pregnancy in first trimester  -     ABO/Rh type and screen; Future  -     Rubella Antibody IgG; Future  -     Hepatitis B Surface Antibody; Future  -     Hepatitis B surface antigen; Future  -     Hepatitis C antibody; Future  -     Vitamin D Deficiency; Future  -     HIV Antigen Antibody Combo Cascade; Future  -     CBC with platelets; Future  -     Treponema Abs w Reflex to RPR and Titer; Future  -     Varicella Zoster Virus Antibody IgG; Future  -     Hemoglobin A1c; Future  -     Chlamydia trachomatis PCR  -     Neisseria gonorrhoeae PCR  -     Urine Culture  -     TSH with free T4 reflex; Future  -     [facility] Referral - Pregnancy;  Future    Hypothyroidism (acquired)  -     TSH with free T4 reflex; Future  -     [facility] Referral - Pregnancy; Future          Orders Placed This Encounter   Procedures    Rubella Antibody IgG    Hepatitis B Surface Antibody    Hepatitis B surface antigen    Hepatitis C antibody    Vitamin D Deficiency    HIV Antigen Antibody Combo Cascade    CBC with platelets    Treponema Abs w Reflex to RPR and Titer    Varicella Zoster Virus Antibody IgG    Hemoglobin A1c    TSH with free T4 reflex    [facility] Referral - Pregnancy    Chlamydia trachomatis PCR    Neisseria gonorrhoeae PCR    Urine Culture    ABO/Rh type and screen        PLAN:  - Reviewed use of triage nurse line and contacting the on-call provider after hours for an urgent need such as fever, vagina bleeding, bladder or vaginal infection, rupture of membranes,  or term labor.    -Ordered routine prenatal lab work.   -TSH ordered due to patients history of hypothyroidism  -Patient would like to defer PAP until postpartum    - Reviewed best evidence for: weight gain for her weight and height for pregnancy:  Based on pre-pregnancy Body mass index is 26.28 kg/m . RECOMMENDED WEIGHT GAIN: 15-25 lbs.    - Reviewed healthy diet and foods to avoid, exercise and activity during pregnancy; avoiding exposure to toxoplasmosis; and maintenance of a generally healthy lifestyle.   - Discussed the harms, benefits, side effects and alternative therapies for current prescribed and OTC medications. Patient was encouraged to start/continue prenatal vitamins as tolerated.    - Oriented to Practice, types of care, and how to reach a provider.  Pt prefers Undecided between CNM and MD, will continue to consider.     - Patient received 1st trimester new OB education packet complete with aide of [facility] Expectant Family booklet including information on genetic screening test options.  - Patient desires 1st trimester screening which was ordered.    - Reviewed risk  for gestational diabetes d/t No known risk factors for GDM,     - Reviewed risk for Pre Eclampsia d/t No known risk factors of High risk for Pre E or meets two or more of the moderate risk factors including No moderate risk factors    - The patient  does have a history of spontaneous  birth so  WILL NOT consider serial transvaginal cervical length ultrasounds from 16-24 weeks. Has a history of PPROM and was induced at 36.5.    -The patient does not have a history of immunosuppresion or HIV so Toxoplasma IgG/IgM WILL NOT be ordered.    -Assess risk for asymptomatic latent TB (prior infection, recent immigrant from epidemic areas, immunosuppression, living in overcrowded environment):   WILL NOT have PPD skin test or Quantiferon-TB Gold Plus blood draw. *both options valid*      - All pt's and partner's questions discussed and answered.  Pt verbalized understanding of and agreement to plan of care.     - Continue scheduled prenatal care and prn if questions or concerns    Brianna BABCOCK DNP student    I, SNM, completed the PFSH and ROS. I then acted as a scribe for CNM for the remainder of the visit. - Brianna BABCOCK DNP student  I agree with the PFSH and ROS as completed by the student, except for changes made by me. The remainder of the encounter scribed by the student. The scribed note accurately reflects my personal services and decisions made by me.  Hafsa Choi, SIRENA, CNM, APRN

## 2024-04-26 ENCOUNTER — TRANSFERRED RECORDS (OUTPATIENT)
Dept: HEALTH INFORMATION MANAGEMENT | Facility: CLINIC | Age: 29
End: 2024-04-26
Payer: COMMERCIAL

## 2024-04-26 ENCOUNTER — TELEPHONE (OUTPATIENT)
Dept: OBGYN | Facility: CLINIC | Age: 29
End: 2024-04-26
Payer: COMMERCIAL

## 2024-04-26 DIAGNOSIS — E03.9 HYPOTHYROIDISM (ACQUIRED): Primary | ICD-10-CM

## 2024-04-26 LAB
C TRACH DNA SPEC QL NAA+PROBE: NEGATIVE
HBV SURFACE AB SERPL IA-ACNC: >1000 M[IU]/ML
HBV SURFACE AB SERPL IA-ACNC: REACTIVE M[IU]/ML
HBV SURFACE AG SERPL QL IA: NONREACTIVE
HCV AB SERPL QL IA: NONREACTIVE
HIV 1+2 AB+HIV1 P24 AG SERPL QL IA: NONREACTIVE
N GONORRHOEA DNA SPEC QL NAA+PROBE: NEGATIVE
VIT D+METAB SERPL-MCNC: 27 NG/ML (ref 20–50)

## 2024-04-26 NOTE — TELEPHONE ENCOUNTER
[facility] Call Center    Phone Message    May a detailed message be left on voicemail: yes     Reason for Call: Requesting Results     Name/type of test: Labs  Date of test: 4/25  Was test done at a location other than [facility] (Please fill in the location if not [facility])?: No    Action Taken: Other: OBGYN    Travel Screening: Not Applicable

## 2024-04-27 LAB
BACTERIA UR CULT: NO GROWTH
T4 FREE SERPL-MCNC: 1.25 NG/DL (ref 0.9–1.7)

## 2024-04-29 ENCOUNTER — TELEPHONE (OUTPATIENT)
Dept: OBGYN | Facility: CLINIC | Age: 29
End: 2024-04-29
Payer: COMMERCIAL

## 2024-04-29 NOTE — TELEPHONE ENCOUNTER
S-(situation): Clarktheresa calls in with concerns about continuing symptoms of ear infection.    B-(background):  at 10w5d GA. Pt was seen 10 days ago at [facility] for ear pain/fullness and was diagnosed with an ear infection. At this time, she was prescribed oral Augmentin and Ciprodex ear drops. She has completed her course of abx as of yesterday.    A-(assessment): Pt is experiencing symptoms of ear fullness and minor ear pain. No discharge from ear, fevers, or signs of systemic infection. These symptoms were improving while taking the abx, but returned last night.     R-(recommendations): Routing to CNM team to see how they would like to proceed.

## 2024-04-30 PROBLEM — O99.891 HX OF POSTPARTUM DEPRESSION, CURRENTLY PREGNANT: Status: ACTIVE | Noted: 2024-03-20

## 2024-04-30 PROBLEM — Z86.59 HX OF POSTPARTUM DEPRESSION, CURRENTLY PREGNANT: Status: ACTIVE | Noted: 2024-03-20

## 2024-04-30 NOTE — TELEPHONE ENCOUNTER
Called Cathy and went over CNM's recommendations. Pt verbalized understanding and will seek care with [facility].

## 2024-04-30 NOTE — TELEPHONE ENCOUNTER
Attempted to call pt to relay CNM's recommendation to [facility]; no answer, LVM letting pt know they [facility] and asked her to call us back.   Client Name: Umm Mackey       Client YOB: 2009    ChasEzekiel Safety Plan      Creation Date: 2/7/24 Update Date: 2/7/25   Created By: MANINDER Mcfarlane Last Updated By: MANINDER Mcfarlane      Step 1: Warning Signs:   Warning Signs   Stuttering more   Not responding   Feeling like can't talk or voice being very quiet            Step 2: Internal Coping Strategies:   Internal Coping Strategies   Look around to distract myself   use headphones   be at home and squeeze a stuffed animal            Step 3: People and social settings that provide distraction:   Name Contact Information   Parents at home/usually with them/mother in phone    Places   Home   [place]           Step 4: People whom I can ask for help during a crisis:      Name Contact Information    Parents at home/usually with them/number in phone      Step 5: Professionals or agencies I can contact during a crisis:      Clinican/Agency Name Phone Emergency Contact    Kristyn Yanes [phone]       Local Emergency Department Emergency Department Phone Emergency Department Address    [facility] [phone] [address] [place] [id number]        Crisis Phone Numbers:   Suicide Prevention Lifeline: Call or Text  032 Crisis Text Line: Text HOME to 077-968   Please note: Some [facility] do not have a separate number for Child/Adolescent specific crisis. If your county is not listed under Child/Adolescent, please call the adult number for your county      Adult Crisis Numbers: Child/Adolescent Crisis Numbers   [facility]: [phone] [facility]: [phone]   [facility]: [phone] [facility]: [phone]   [facility]: [phone] [place]: [phone]   [facility]: [phone] [place] [facility]: [phone]   [place] [facility]: [phone]   [facility]: [phone]   [facility]: [phone]   [facility]: [phone] (daytime)  [phone] (after hours, weekends, holidays)      Step 6: Making the environment safer (plan for lethal means safety):   Patient did not identify any lethal methods: Yes     Optional: What is most important to me and worth living for?      [place] Safety Plan. Maureen Doherty and Bjorn Falcon. Used with permission of the authors.

## 2024-04-30 NOTE — TELEPHONE ENCOUNTER
[facility] Call Center    Phone Message    May a detailed message be left on voicemail: yes     Reason for Call: Other: Patient returned call and asked to speak with YANG Juarez. Writer sent secure chat but was told to send a TE. Please contact patient in regards to below message. Thank you      Action Taken: Other: Whs    Travel Screening: Not Applicable

## 2024-05-03 ENCOUNTER — APPOINTMENT (OUTPATIENT)
Dept: ULTRASOUND IMAGING | Facility: CLINIC | Age: 29
End: 2024-05-03
Attending: EMERGENCY MEDICINE
Payer: COMMERCIAL

## 2024-05-03 ENCOUNTER — HOSPITAL ENCOUNTER (EMERGENCY)
Facility: CLINIC | Age: 29
Discharge: HOME | End: 2024-05-04
Attending: EMERGENCY MEDICINE | Admitting: EMERGENCY MEDICINE
Payer: COMMERCIAL

## 2024-05-03 VITALS
DIASTOLIC BLOOD PRESSURE: 75 MMHG | TEMPERATURE: 97.9 F | SYSTOLIC BLOOD PRESSURE: 109 MMHG | RESPIRATION RATE: 14 BRPM | OXYGEN SATURATION: 97 % | HEART RATE: 91 BPM

## 2024-05-03 DIAGNOSIS — R10.13 EPIGASTRIC PAIN: ICD-10-CM

## 2024-05-03 LAB
ALBUMIN SERPL BCG-MCNC: 3.9 G/DL (ref 3.5–5.2)
ALP SERPL-CCNC: 66 U/L (ref 40–150)
ALT SERPL W P-5'-P-CCNC: 10 U/L (ref 0–50)
ANION GAP SERPL CALCULATED.3IONS-SCNC: 13 MMOL/L (ref 7–15)
AST SERPL W P-5'-P-CCNC: 22 U/L (ref 0–45)
BASOPHILS # BLD AUTO: 0 10E3/UL (ref 0–0.2)
BASOPHILS NFR BLD AUTO: 0 %
BILIRUB SERPL-MCNC: 0.2 MG/DL
BUN SERPL-MCNC: 4.4 MG/DL (ref 6–20)
CALCIUM SERPL-MCNC: 9.4 MG/DL (ref 8.6–10)
CHLORIDE SERPL-SCNC: 105 MMOL/L (ref 98–107)
CREAT SERPL-MCNC: 0.45 MG/DL (ref 0.51–0.95)
DEPRECATED HCO3 PLAS-SCNC: 18 MMOL/L (ref 22–29)
EGFRCR SERPLBLD CKD-EPI 2021: >90 ML/MIN/1.73M2
EOSINOPHIL # BLD AUTO: 0.1 10E3/UL (ref 0–0.7)
EOSINOPHIL NFR BLD AUTO: 1 %
ERYTHROCYTE [DISTWIDTH] IN BLOOD BY AUTOMATED COUNT: 14 % (ref 10–15)
GLUCOSE SERPL-MCNC: 88 MG/DL (ref 70–99)
HCT VFR BLD AUTO: 35.9 % (ref 35–47)
HGB BLD-MCNC: 12.1 G/DL (ref 11.7–15.7)
IMM GRANULOCYTES # BLD: 0 10E3/UL
IMM GRANULOCYTES NFR BLD: 0 %
LIPASE SERPL-CCNC: 40 U/L (ref 13–60)
LYMPHOCYTES # BLD AUTO: 2.7 10E3/UL (ref 0.8–5.3)
LYMPHOCYTES NFR BLD AUTO: 38 %
MCH RBC QN AUTO: 28.4 PG (ref 26.5–33)
MCHC RBC AUTO-ENTMCNC: 33.7 G/DL (ref 31.5–36.5)
MCV RBC AUTO: 84 FL (ref 78–100)
MONOCYTES # BLD AUTO: 0.3 10E3/UL (ref 0–1.3)
MONOCYTES NFR BLD AUTO: 5 %
NEUTROPHILS # BLD AUTO: 4.1 10E3/UL (ref 1.6–8.3)
NEUTROPHILS NFR BLD AUTO: 56 %
NRBC # BLD AUTO: 0 10E3/UL
NRBC BLD AUTO-RTO: 0 /100
PLATELET # BLD AUTO: 181 10E3/UL (ref 150–450)
POTASSIUM SERPL-SCNC: 3.6 MMOL/L (ref 3.4–5.3)
PROT SERPL-MCNC: 7.2 G/DL (ref 6.4–8.3)
RBC # BLD AUTO: 4.26 10E6/UL (ref 3.8–5.2)
SODIUM SERPL-SCNC: 136 MMOL/L (ref 135–145)
WBC # BLD AUTO: 7.2 10E3/UL (ref 4–11)

## 2024-05-03 PROCEDURE — 76815 OB US LIMITED FETUS(S): CPT | Mod: 26 | Performed by: EMERGENCY MEDICINE

## 2024-05-03 PROCEDURE — 96375 TX/PRO/DX INJ NEW DRUG ADDON: CPT | Performed by: EMERGENCY MEDICINE

## 2024-05-03 PROCEDURE — 83690 ASSAY OF LIPASE: CPT | Performed by: EMERGENCY MEDICINE

## 2024-05-03 PROCEDURE — 99284 EMERGENCY DEPT VISIT MOD MDM: CPT | Mod: 25 | Performed by: EMERGENCY MEDICINE

## 2024-05-03 PROCEDURE — 36415 COLL VENOUS BLD VENIPUNCTURE: CPT | Performed by: EMERGENCY MEDICINE

## 2024-05-03 PROCEDURE — 96361 HYDRATE IV INFUSION ADD-ON: CPT | Performed by: EMERGENCY MEDICINE

## 2024-05-03 PROCEDURE — 99285 EMERGENCY DEPT VISIT HI MDM: CPT | Mod: 25 | Performed by: EMERGENCY MEDICINE

## 2024-05-03 PROCEDURE — 76705 ECHO EXAM OF ABDOMEN: CPT

## 2024-05-03 PROCEDURE — 80053 COMPREHEN METABOLIC PANEL: CPT | Performed by: EMERGENCY MEDICINE

## 2024-05-03 PROCEDURE — 96374 THER/PROPH/DIAG INJ IV PUSH: CPT | Performed by: EMERGENCY MEDICINE

## 2024-05-03 PROCEDURE — 85025 COMPLETE CBC W/AUTO DIFF WBC: CPT | Performed by: EMERGENCY MEDICINE

## 2024-05-03 PROCEDURE — 76815 OB US LIMITED FETUS(S): CPT | Performed by: EMERGENCY MEDICINE

## 2024-05-03 PROCEDURE — 76705 ECHO EXAM OF ABDOMEN: CPT | Mod: 26 | Performed by: RADIOLOGY

## 2024-05-03 PROCEDURE — 250N000013 HC RX MED GY IP 250 OP 250 PS 637: Performed by: EMERGENCY MEDICINE

## 2024-05-03 RX ORDER — MAGNESIUM HYDROXIDE/ALUMINUM HYDROXICE/SIMETHICONE 120; 1200; 1200 MG/30ML; MG/30ML; MG/30ML
15 SUSPENSION ORAL ONCE
Status: COMPLETED | OUTPATIENT
Start: 2024-05-03 | End: 2024-05-03

## 2024-05-03 RX ORDER — ONDANSETRON 2 MG/ML
4 INJECTION INTRAMUSCULAR; INTRAVENOUS ONCE
Status: COMPLETED | OUTPATIENT
Start: 2024-05-03 | End: 2024-05-04

## 2024-05-03 RX ADMIN — ALUMINUM HYDROXIDE, MAGNESIUM HYDROXIDE, AND SIMETHICONE 15 ML: 200; 200; 20 SUSPENSION ORAL at 23:07

## 2024-05-03 ASSESSMENT — ACTIVITIES OF DAILY LIVING (ADL): ADLS_ACUITY_SCORE: 33

## 2024-05-04 PROCEDURE — 250N000011 HC RX IP 250 OP 636: Performed by: EMERGENCY MEDICINE

## 2024-05-04 PROCEDURE — 999N000248 HC STATISTIC IV INSERT WITH US BY RN

## 2024-05-04 PROCEDURE — 258N000003 HC RX IP 258 OP 636: Performed by: EMERGENCY MEDICINE

## 2024-05-04 RX ADMIN — ONDANSETRON 4 MG: 2 INJECTION INTRAMUSCULAR; INTRAVENOUS at 00:58

## 2024-05-04 RX ADMIN — SODIUM CHLORIDE 1000 ML: 9 INJECTION, SOLUTION INTRAVENOUS at 01:09

## 2024-05-04 RX ADMIN — FAMOTIDINE 20 MG: 10 INJECTION, SOLUTION INTRAVENOUS at 00:58

## 2024-05-04 ASSESSMENT — ACTIVITIES OF DAILY LIVING (ADL)
ADLS_ACUITY_SCORE: 35
ADLS_ACUITY_SCORE: 35

## 2024-05-04 NOTE — ED PROVIDER NOTES
ED Provider Note  [facility]      History     Chief Complaint   Patient presents with    Abdominal Pain     HPI  Cathy Mace is a 29 year old female who presents for abdominal pain.  The patient states that a few weeks ago she had gotten prescribed antibiotics for a left ear infection.  She completed this first course of antibiotics but her symptoms and not completely resolved.  Then earlier this week she saw the clinic again and was prescribed a second course of antibiotics, this time azithromycin.  She started taking this antibiotic.  Then over this morning after taking a dose she developed some epigastric pain with nausea and vomiting.  Pain has been on and off since then and is located primarily epigastric area.  She tried some antiemetics at home but still felt ill so came to the ED.  She denies any fevers, chills.  No changes in urination or bowel movements.  No vaginal discharge or bleeding.    Past Medical History  Past Medical History:   Diagnosis Date    Hypothyroidism (acquired) 03/20/2024    Post partum depression 03/20/2024    was on Lexapro, weaned from 20 mg to 5 mg and ended 04/03    Rectal fissure 03/20/2024     Past Surgical History:   Procedure Laterality Date    APPENDECTOMY  2023     acetaminophen (TYLENOL) 325 MG tablet  amoxicillin-clavulanate (AUGMENTIN) 875-125 MG tablet  ciprofloxacin-dexAMETHasone (CIPRODEX) 0.3-0.1 % otic suspension  doxylamine (UNISOM) 25 MG TABS tablet  famotidine (PEPCID) 20 MG tablet  levothyroxine (SYNTHROID/LEVOTHROID) 75 MCG tablet  metoclopramide (REGLAN) 5 MG tablet  ondansetron (ZOFRAN ODT) 4 MG ODT tab  Prenatal Vit-Fe Fumarate-FA (PRENATAL PLUS) 27-1 MG TABS      No Known Allergies  Family History  Family History   Problem Relation Age of Onset    No Known Problems Mother     No Known Problems Father     No Known Problems Sister     No Known Problems Brother     No Known Problems Brother     No Known Problems Maternal Grandmother          unknown    No Known Problems Maternal Grandfather         unknown    No Known Problems Paternal Grandmother         unknown    No Known Problems Paternal Grandfather         unknown     Social History   Social History     Tobacco Use    Smoking status: Never    Smokeless tobacco: Never   Substance Use Topics    Alcohol use: Never    Drug use: Never         A medically appropriate review of systems was performed with pertinent positives and negatives noted in the HPI, and all other systems negative.    Physical Exam   BP: 109/75  Pulse: 91  Temp: 97.9  F (36.6  C)  Resp: 14  SpO2: 97 %  Physical Exam  Constitutional:       General: She is not in acute distress.     Appearance: She is not toxic-appearing.   HENT:      Head: Normocephalic and atraumatic.   Cardiovascular:      Rate and Rhythm: Normal rate and regular rhythm.      Heart sounds: No murmur heard.     No gallop.   Pulmonary:      Effort: Pulmonary effort is normal. No respiratory distress.      Breath sounds: No wheezing or rales.   Abdominal:      General: There is no distension.      Palpations: Abdomen is soft.      Tenderness: There is no guarding or rebound.      Comments: Mild epigastric tenderness   Musculoskeletal:         General: Normal range of motion.   Skin:     General: Skin is warm and dry.   Neurological:      General: No focal deficit present.      Mental Status: She is alert and oriented to person, place, and time.           ED Course, Procedures, & Data      Procedures  Results for orders placed during the hospital encounter of 05/03/24    POC US OB TRANSABDOMINAL LIMITED    Narrative  Limited Bedside Transabdominal ultrasound for evaluation of IUP  Performed any interpreted by me.    Indication:  abdominal pain  Findings:  The lower abdomen was interrogated with a curvilinear probe. The uterus was identified.  Within the uterus there is live IUP with movement, FHR of ~165 when measured with M mode    Impression: Live intrauterine  pregnancy    Impression  Impression: Live intrauterine pregnancy               Results for orders placed or performed during the hospital encounter of 05/03/24   POC US OB TRANSABDOMINAL LIMITED     Status: None    Narrative    Limited Bedside Transabdominal ultrasound for evaluation of IUP        Performed any interpreted by me.    Indication:  abdominal pain  Findings:  The lower abdomen was interrogated with a curvilinear probe. The uterus was identified.   Within the uterus there is live IUP with movement, FHR of ~165 when measured with M mode    Impression: Live intrauterine pregnancy       Impression    Impression: Live intrauterine pregnancy   US Abdomen Limited     Status: None (Preliminary result)    Impression    RESIDENT PRELIMINARY INTERPRETATION  IMPRESSION:   Unremarkable right upper quadrant ultrasound.   Comprehensive metabolic panel     Status: Abnormal   Result Value Ref Range    Sodium 136 135 - 145 mmol/L    Potassium 3.6 3.4 - 5.3 mmol/L    Carbon Dioxide (CO2) 18 (L) 22 - 29 mmol/L    Anion Gap 13 7 - 15 mmol/L    Urea Nitrogen 4.4 (L) 6.0 - 20.0 mg/dL    Creatinine 0.45 (L) 0.51 - 0.95 mg/dL    GFR Estimate >90 >60 mL/min/1.73m2    Calcium 9.4 8.6 - 10.0 mg/dL    Chloride 105 98 - 107 mmol/L    Glucose 88 70 - 99 mg/dL    Alkaline Phosphatase 66 40 - 150 U/L    AST 22 0 - 45 U/L    ALT 10 0 - 50 U/L    Protein Total 7.2 6.4 - 8.3 g/dL    Albumin 3.9 3.5 - 5.2 g/dL    Bilirubin Total 0.2 <=1.2 mg/dL   Lipase     Status: Normal   Result Value Ref Range    Lipase 40 13 - 60 U/L   CBC with platelets and differential     Status: None   Result Value Ref Range    WBC Count 7.2 4.0 - 11.0 10e3/uL    RBC Count 4.26 3.80 - 5.20 10e6/uL    Hemoglobin 12.1 11.7 - 15.7 g/dL    Hematocrit 35.9 35.0 - 47.0 %    MCV 84 78 - 100 fL    MCH 28.4 26.5 - 33.0 pg    MCHC 33.7 31.5 - 36.5 g/dL    RDW 14.0 10.0 - 15.0 %    Platelet Count 181 150 - 450 10e3/uL    % Neutrophils 56 %    % Lymphocytes 38 %    % Monocytes  5 %    % Eosinophils 1 %    % Basophils 0 %    % Immature Granulocytes 0 %    NRBCs per 100 WBC 0 <1 /100    Absolute Neutrophils 4.1 1.6 - 8.3 10e3/uL    Absolute Lymphocytes 2.7 0.8 - 5.3 10e3/uL    Absolute Monocytes 0.3 0.0 - 1.3 10e3/uL    Absolute Eosinophils 0.1 0.0 - 0.7 10e3/uL    Absolute Basophils 0.0 0.0 - 0.2 10e3/uL    Absolute Immature Granulocytes 0.0 <=0.4 10e3/uL    Absolute NRBCs 0.0 10e3/uL   CBC with platelets differential     Status: None    Narrative    The following orders were created for panel order CBC with platelets differential.  Procedure                               Abnormality         Status                     ---------                               -----------         ------                     CBC with platelets and d...[[id number]]                      Final result                 Please view results for these tests on the individual orders.     Medications   sodium chloride 0.9% BOLUS 1,000 mL (has no administration in time range)   ondansetron (ZOFRAN) injection 4 mg (has no administration in time range)   famotidine (PEPCID) injection 20 mg (has no administration in time range)   alum & mag hydroxide-simethicone (MAALOX) suspension 15 mL (15 mLs Oral $Given 5/3/24 0159)     Labs Ordered and Resulted from Time of ED Arrival to Time of ED Departure   COMPREHENSIVE METABOLIC PANEL - Abnormal       Result Value    Sodium 136      Potassium 3.6      Carbon Dioxide (CO2) 18 (*)     Anion Gap 13      Urea Nitrogen 4.4 (*)     Creatinine 0.45 (*)     GFR Estimate >90      Calcium 9.4      Chloride 105      Glucose 88      Alkaline Phosphatase 66      AST 22      ALT 10      Protein Total 7.2      Albumin 3.9      Bilirubin Total 0.2     LIPASE - Normal    Lipase 40     CBC WITH PLATELETS AND DIFFERENTIAL    WBC Count 7.2      RBC Count 4.26      Hemoglobin 12.1      Hematocrit 35.9      MCV 84      MCH 28.4      MCHC 33.7      RDW 14.0      Platelet Count 181      % Neutrophils 56       % Lymphocytes 38      % Monocytes 5      % Eosinophils 1      % Basophils 0      % Immature Granulocytes 0      NRBCs per 100 WBC 0      Absolute Neutrophils 4.1      Absolute Lymphocytes 2.7      Absolute Monocytes 0.3      Absolute Eosinophils 0.1      Absolute Basophils 0.0      Absolute Immature Granulocytes 0.0      Absolute NRBCs 0.0       US Abdomen Limited   Preliminary Result   RESIDENT PRELIMINARY INTERPRETATION   IMPRESSION:    Unremarkable right upper quadrant ultrasound.      POC US OB TRANSABDOMINAL LIMITED   Final Result   Impression: Live intrauterine pregnancy             Critical care was not performed.     Medical Decision Making  The patient's presentation was of moderate complexity (an undiagnosed new problem with uncertain diagnosis).    The patient's evaluation involved:  review of external note(s) from 3+ sources (OB note, telephone encounter, family medicine note)  review of 3+ test result(s) ordered prior to this encounter (cbc, cmp, lipase)  ordering and/or review of 3+ test(s) in this encounter (see separate area of note for details)    The patient's management necessitated moderate risk (prescription drug management including medications given in the ED).    Assessment & Plan    This is a 29-year-old female presenting with abdominal pain, nausea.  Overall she is well-appearing.  She was afebrile and vitals are reassuring.  Her abdomen was soft and she does significant tenderness.  Onset of symptoms following taking antibiotics suggestive of possible gastritis.  A bedside ultrasound was done which showed a live IUP.  A right upper quadrant ultrasound was done and negative for acute process.  Blood work was overall reassuring.  CBC showed no leukocytosis or anemia.  Lipase was normal.    The patient received Maalox and did have some improvement in pain following this.  There was difficulty obtaining an IV and so the patient is signed out pending fluids, Zofran, Pepcid administration.    I  have reviewed the nursing notes. I have reviewed the findings, diagnosis, plan and need for follow up with the patient.    New Prescriptions    No medications on file       Final diagnoses:   None       David Ramos  [facility] EMERGENCY DEPARTMENT  5/3/2024     David Ramos MD  05/04/24 0006

## 2024-05-04 NOTE — ED NOTES
Sign out from Dr. Ramos at 1AM    Situation: 28 yo F with epigastric pain, currently pregnant.     Plan: labs reassuring, awaiting medication administration. Will need reassessment.     Events:   2:10 AM  Reassessed. Has improved with the treatment. Has gasivscon and pepcid at home. Recommended follow up with Primary Care. Questions answered and pt discharged.     MD Bob Lopez, Lanette Villavicencio MD  05/05/24 0581

## 2024-05-04 NOTE — PROGRESS NOTES
Patient comes in c/o ear infection of which she finished the first course of abx. She had started the second course and started having 10/10 abdominal pain on day 3 of 4 of tx. 4-5 episodes of emesis today. 11 weeks pregnant with 2nd child.

## 2024-05-04 NOTE — PROGRESS NOTES
Patient ambulatory on discharge. All discharge reviewed with the patient including follow up with primary care and if needed prescription refill with primary care team. Patient departed alert and responsive. PIV removed. Patient verbalized understanding.

## 2024-05-04 NOTE — DISCHARGE INSTRUCTIONS
Thank you for coming to the [facility] Emergency Department.     You may use maalox or a similar over the counter liquid antacid for epigastric discomfort and heartburn in pregnancy.     Please follow up with your OB/gyn or primary care provider if not feeling better in a few days.

## 2024-05-09 ENCOUNTER — APPOINTMENT (OUTPATIENT)
Dept: ULTRASOUND IMAGING | Facility: CLINIC | Age: 29
End: 2024-05-09
Attending: EMERGENCY MEDICINE
Payer: COMMERCIAL

## 2024-05-09 ENCOUNTER — PRE VISIT (OUTPATIENT)
Dept: MATERNAL FETAL MEDICINE | Facility: CLINIC | Age: 29
End: 2024-05-09
Payer: COMMERCIAL

## 2024-05-09 ENCOUNTER — HOSPITAL ENCOUNTER (EMERGENCY)
Facility: CLINIC | Age: 29
Discharge: HOME | End: 2024-05-09
Attending: EMERGENCY MEDICINE | Admitting: EMERGENCY MEDICINE
Payer: COMMERCIAL

## 2024-05-09 ENCOUNTER — TELEPHONE (OUTPATIENT)
Dept: OBGYN | Facility: CLINIC | Age: 29
End: 2024-05-09
Payer: COMMERCIAL

## 2024-05-09 VITALS
TEMPERATURE: 97.9 F | OXYGEN SATURATION: 100 % | DIASTOLIC BLOOD PRESSURE: 57 MMHG | RESPIRATION RATE: 16 BRPM | HEIGHT: 64 IN | HEART RATE: 72 BPM | WEIGHT: 165 LBS | BODY MASS INDEX: 28.17 KG/M2 | SYSTOLIC BLOOD PRESSURE: 97 MMHG

## 2024-05-09 DIAGNOSIS — M79.631 PAIN OF RIGHT FOREARM: ICD-10-CM

## 2024-05-09 PROCEDURE — 93971 EXTREMITY STUDY: CPT | Mod: RT

## 2024-05-09 PROCEDURE — 99284 EMERGENCY DEPT VISIT MOD MDM: CPT | Mod: 25 | Performed by: EMERGENCY MEDICINE

## 2024-05-09 PROCEDURE — 99284 EMERGENCY DEPT VISIT MOD MDM: CPT | Performed by: EMERGENCY MEDICINE

## 2024-05-09 ASSESSMENT — ACTIVITIES OF DAILY LIVING (ADL)
ADLS_ACUITY_SCORE: 35
ADLS_ACUITY_SCORE: 35

## 2024-05-09 ASSESSMENT — COLUMBIA-SUICIDE SEVERITY RATING SCALE - C-SSRS
1. IN THE PAST MONTH, HAVE YOU WISHED YOU WERE DEAD OR WISHED YOU COULD GO TO SLEEP AND NOT WAKE UP?: NO
6. HAVE YOU EVER DONE ANYTHING, STARTED TO DO ANYTHING, OR PREPARED TO DO ANYTHING TO END YOUR LIFE?: NO
2. HAVE YOU ACTUALLY HAD ANY THOUGHTS OF KILLING YOURSELF IN THE PAST MONTH?: NO

## 2024-05-09 NOTE — DISCHARGE INSTRUCTIONS
Please follow-up with your primary care provider in the next 2 to 3 days for further evaluation and follow-up.    Please apply ice or heat to the area to help with discomfort.  You may take Tylenol every 6 hours for pain.  Please return to the emergency department if you develop severe pain, persistent high fever, redness, inability to move your arm, new or worsening symptoms.  It was a pleasure taking care of you today.  We hope you feel better soon.

## 2024-05-09 NOTE — ED PROVIDER NOTES
ED Provider Note  [facility]      History     Chief Complaint   Patient presents with    Arm Pain     Right lower arm pain. Was seen in [place] and had difficult IV start.  Now pain has increased. Tried Tylenol and cold pack with no relief.     HPI  Cathy Mace is a 29 year old female who resents to the emergency department for evaluation of right lower arm pain.  Patient reports that she was seen in the [place] emergency department approximately 6 days ago on 5/3/2024 for abdominal pain and at that time they attempted to start an IV on her right arm near her wrist.  Patient reports that they missed a IV and ended up putting another IV in higher up in her forearm.  States that initially she did have some discomfort to that area and does have some swelling and bruising.  Patient reports that woke up this morning with increased pain, swelling.  Patient has been taking Tylenol and cold pack with no relief.  She reports pain that is worse with range of motion.  Denies any fever, chills, chest pain, shortness of breath.  Patient denies any other trauma or injury.  No other complaint.  She is currently pregnant approximately 12 weeks.    Past Medical History  Past Medical History:   Diagnosis Date    Hypothyroidism (acquired) 03/20/2024    Post partum depression 03/20/2024    was on Lexapro, weaned from 20 mg to 5 mg and ended 04/03    Rectal fissure 03/20/2024     Past Surgical History:   Procedure Laterality Date    APPENDECTOMY  2023     levothyroxine (SYNTHROID/LEVOTHROID) 75 MCG tablet  Prenatal Vit-Fe Fumarate-FA (PRENATAL PLUS) 27-1 MG TABS  acetaminophen (TYLENOL) 325 MG tablet  amoxicillin-clavulanate (AUGMENTIN) 875-125 MG tablet  ciprofloxacin-dexAMETHasone (CIPRODEX) 0.3-0.1 % otic suspension  doxylamine (UNISOM) 25 MG TABS tablet  famotidine (PEPCID) 20 MG tablet  metoclopramide (REGLAN) 5 MG tablet  ondansetron (ZOFRAN ODT) 4 MG ODT tab      No Known Allergies  Family  "History  Family History   Problem Relation Age of Onset    No Known Problems Mother     No Known Problems Father     No Known Problems Sister     No Known Problems Brother     No Known Problems Brother     No Known Problems Maternal Grandmother         unknown    No Known Problems Maternal Grandfather         unknown    No Known Problems Paternal Grandmother         unknown    No Known Problems Paternal Grandfather         unknown     Social History   Social History     Tobacco Use    Smoking status: Never    Smokeless tobacco: Never   Substance Use Topics    Alcohol use: Never    Drug use: Never      Past medical history, past surgical history, medications, allergies, family history, and social history were reviewed with the patient. No additional pertinent items.      A medically appropriate review of systems was performed with pertinent positives and negatives noted in the HPI, and all other systems negative.    Physical Exam   BP: 121/81  Pulse: 80  Temp: 97.9  F (36.6  C)  Resp: 16  Height: 162.6 cm (5' 4\")  Weight: 74.8 kg (165 lb)  SpO2: 100 %  Physical Exam  General: Afebrile, moderate distress   HEENT: Normocephalic, atraumatic, conjunctivae normal. MMM  Neck: non-tender, supple  Cardio: regular rate. regular rhythm   Resp: Normal work of breathing, no respiratory distress, lungs clear bilaterally, no wheezing, rhonchi, rales  Chest/Back: no visual signs of trauma, no CVA tenderness   Abdomen: soft, non distension, no tenderness, no peritoneal signs   Neuro: alert and fully oriented. CN II-XII grossly intact. Grossly normal strength and sensation in all extremities.   MSK: +right lower arm pain/right wrist with decreased ROM at wrist and hand 2/2 pain. No focal motor or sensory deficit. Cap refill < 3 seconds, compartments soft. There is swelling and ecchymosis noted to area of pain. Patient reports missed IV attempt here on last ED visit.   Integumentary/Skin: no rash visualized, normal color  Psych: " normal affect, normal behavior      ED Course, Procedures, & Data      Procedures       Results for orders placed or performed during the hospital encounter of 05/09/24   US Upper Extremity Venous Duplex Right     Status: None    Narrative    EXAM: US UPPER EXTREMITY VENOUS DUPLEX RIGHT  LOCATION: [facility]  DATE: 5/9/2024    INDICATION: pain, swelling, currently pregnant, r o dvt  COMPARISON: None.  TECHNIQUE: Venous Duplex ultrasound of the right upper extremity with (when possible) and without compression, augmentation, and duplex. Color flow and spectral Doppler with waveform analysis performed.    FINDINGS: Ultrasound includes evaluation of the internal jugular vein, innominate vein, subclavian vein, axillary vein, and brachial vein. The superficial cephalic and basilic veins were also evaluated where seen.     RIGHT: No deep venous thrombosis. No superficial thrombophlebitis.      Impression    IMPRESSION:  1.  No deep venous thrombosis in the right upper extremity.     Medications - No data to display  Labs Ordered and Resulted from Time of ED Arrival to Time of ED Departure - No data to display  US Upper Extremity Venous Duplex Right   Final Result   IMPRESSION:   1.  No deep venous thrombosis in the right upper extremity.             Critical care was not performed.     Medical Decision Making  The patient's presentation was of moderate complexity (an undiagnosed new problem with uncertain diagnosis).    The patient's evaluation involved:  review of external note(s) from 1 sources (most recent ED notes)  strong consideration of a test (xray) that was ultimately deferred  ordering and/or review of 1 test(s) in this encounter (see separate area of note for details)  independent interpretation of testing performed by another health professional ([place])    The patient's management necessitated only low risk treatment.    Assessment & Plan    Cathy Mace is a 29 year  old female who resents to the emergency department for evaluation of right lower arm pain.  Upon arrival patient is nontoxic-appearing, afebrile, moderate distress.  Patient here with right lower arm pain near her wrist at the site of a recent missed IV attempt.  There is swelling, ecchymosis to the area.  Patient does have full passive range of motion however does report worsening pain with range of motion.  Compartments soft, cap refill less than 3 seconds, no focal motor or sensory deficit.  Patient took Tylenol prior to arrival.  Plan to hold off on anti-inflammatories given patient's current state of pregnancy.  Stronger pain medication offered, patient declined at this time due to state of pregnancy.     Low suspicious for acute fracture/dislocation given no history of trauma.  Considered x-ray however will hold off at this time given pregnancy status as well as no history of trauma.  Patient reports pain started since missed IV attempt -is localized swelling and hematoma to the area so suspect likely pain secondary to hematoma/contusion.  Ultrasound was performed to rule out DVT.  Discussed at length with patient results, possible etiology of symptoms.  At this time plan for supportive care over the next 3 to 5 days with Tylenol, ice.  Recommend close outpatient follow-up but if no improvement of symptoms recommend further evaluation, possibly x-ray.  Patient understands and agrees with the plan.    I have reviewed the nursing notes. I have reviewed the findings, diagnosis, plan and need for follow up with the patient.    Discharge Medication List as of 5/9/2024  6:53 AM          Final diagnoses:   Pain of right forearm       April Baptiste MD  [facility] EMERGENCY DEPARTMENT  5/9/2024     April Baptiste MD  05/10/24 0114

## 2024-05-09 NOTE — TELEPHONE ENCOUNTER
Patient was seen in ED for fluids 6 days ago.  She states that nurse was unable to start IV, and had to call for someone else to start it.  She had minor pain at that time.    Yesterday, pain increased and she is noting it is now swollen.  She is tearful on the phone.  States that she went to the ED, they did US of whole arm and neck.  No DVT, no superficial thrombophlebitis noted.    They encouraged tylenol and warm/cold packs.  States that the pain is not manageable with these interventions.    Schedule an in clinic appointment for tomorrow, but she is hoping for some relief sooner.

## 2024-05-10 NOTE — TELEPHONE ENCOUNTER
"Writer called patient to follow up. Pt states she thought her  had cancelled the appt this morning dt her feeling nauseous. Pt reports she's using Tylenol and ice packs to help with her pain and reports \"it is feeling better\". Writer let pt know to call our clinic if symptoms get worse or persist for a visit, also let pt know that per Magalis De León, \"if she is still in significant pain she should return to ED\". Passed along to patient. Pt verbalized understand and ok with that plan at this time.   "

## 2024-05-13 ENCOUNTER — APPOINTMENT (OUTPATIENT)
Dept: GENERAL RADIOLOGY | Facility: CLINIC | Age: 29
End: 2024-05-13
Attending: STUDENT IN AN ORGANIZED HEALTH CARE EDUCATION/TRAINING PROGRAM
Payer: COMMERCIAL

## 2024-05-13 ENCOUNTER — APPOINTMENT (OUTPATIENT)
Dept: ULTRASOUND IMAGING | Facility: CLINIC | Age: 29
End: 2024-05-13
Attending: STUDENT IN AN ORGANIZED HEALTH CARE EDUCATION/TRAINING PROGRAM
Payer: COMMERCIAL

## 2024-05-13 ENCOUNTER — HOSPITAL ENCOUNTER (EMERGENCY)
Facility: CLINIC | Age: 29
Discharge: HOME | End: 2024-05-13
Attending: STUDENT IN AN ORGANIZED HEALTH CARE EDUCATION/TRAINING PROGRAM | Admitting: STUDENT IN AN ORGANIZED HEALTH CARE EDUCATION/TRAINING PROGRAM
Payer: COMMERCIAL

## 2024-05-13 VITALS
OXYGEN SATURATION: 99 % | DIASTOLIC BLOOD PRESSURE: 71 MMHG | SYSTOLIC BLOOD PRESSURE: 102 MMHG | TEMPERATURE: 97.9 F | RESPIRATION RATE: 16 BRPM | BODY MASS INDEX: 27.64 KG/M2 | HEART RATE: 103 BPM | WEIGHT: 161 LBS

## 2024-05-13 DIAGNOSIS — M65.4 DE QUERVAIN'S TENOSYNOVITIS: ICD-10-CM

## 2024-05-13 LAB
ANION GAP SERPL CALCULATED.3IONS-SCNC: 11 MMOL/L (ref 7–15)
BASOPHILS # BLD AUTO: 0 10E3/UL (ref 0–0.2)
BASOPHILS NFR BLD AUTO: 0 %
BUN SERPL-MCNC: 5.3 MG/DL (ref 6–20)
CALCIUM SERPL-MCNC: 9.7 MG/DL (ref 8.6–10)
CHLORIDE SERPL-SCNC: 101 MMOL/L (ref 98–107)
CREAT SERPL-MCNC: 0.44 MG/DL (ref 0.51–0.95)
CRP SERPL-MCNC: 20.14 MG/L
DEPRECATED HCO3 PLAS-SCNC: 22 MMOL/L (ref 22–29)
EGFRCR SERPLBLD CKD-EPI 2021: >90 ML/MIN/1.73M2
EOSINOPHIL # BLD AUTO: 0 10E3/UL (ref 0–0.7)
EOSINOPHIL NFR BLD AUTO: 0 %
ERYTHROCYTE [DISTWIDTH] IN BLOOD BY AUTOMATED COUNT: 13.8 % (ref 10–15)
ERYTHROCYTE [SEDIMENTATION RATE] IN BLOOD BY WESTERGREN METHOD: 46 MM/HR (ref 0–20)
GLUCOSE SERPL-MCNC: 84 MG/DL (ref 70–99)
HCT VFR BLD AUTO: 41 % (ref 35–47)
HGB BLD-MCNC: 13.6 G/DL (ref 11.7–15.7)
IMM GRANULOCYTES # BLD: 0 10E3/UL
IMM GRANULOCYTES NFR BLD: 0 %
LYMPHOCYTES # BLD AUTO: 1.8 10E3/UL (ref 0.8–5.3)
LYMPHOCYTES NFR BLD AUTO: 34 %
MCH RBC QN AUTO: 28.3 PG (ref 26.5–33)
MCHC RBC AUTO-ENTMCNC: 33.2 G/DL (ref 31.5–36.5)
MCV RBC AUTO: 85 FL (ref 78–100)
MONOCYTES # BLD AUTO: 0.3 10E3/UL (ref 0–1.3)
MONOCYTES NFR BLD AUTO: 6 %
NEUTROPHILS # BLD AUTO: 3.3 10E3/UL (ref 1.6–8.3)
NEUTROPHILS NFR BLD AUTO: 60 %
NRBC # BLD AUTO: 0 10E3/UL
NRBC BLD AUTO-RTO: 0 /100
PLATELET # BLD AUTO: 220 10E3/UL (ref 150–450)
POTASSIUM SERPL-SCNC: 5.2 MMOL/L (ref 3.4–5.3)
RBC # BLD AUTO: 4.8 10E6/UL (ref 3.8–5.2)
SODIUM SERPL-SCNC: 134 MMOL/L (ref 135–145)
WBC # BLD AUTO: 5.5 10E3/UL (ref 4–11)

## 2024-05-13 PROCEDURE — 76882 US LMTD JT/FCL EVL NVASC XTR: CPT | Mod: 50

## 2024-05-13 PROCEDURE — 85652 RBC SED RATE AUTOMATED: CPT | Performed by: STUDENT IN AN ORGANIZED HEALTH CARE EDUCATION/TRAINING PROGRAM

## 2024-05-13 PROCEDURE — 99284 EMERGENCY DEPT VISIT MOD MDM: CPT | Mod: 25 | Performed by: STUDENT IN AN ORGANIZED HEALTH CARE EDUCATION/TRAINING PROGRAM

## 2024-05-13 PROCEDURE — 36415 COLL VENOUS BLD VENIPUNCTURE: CPT | Performed by: STUDENT IN AN ORGANIZED HEALTH CARE EDUCATION/TRAINING PROGRAM

## 2024-05-13 PROCEDURE — 73110 X-RAY EXAM OF WRIST: CPT | Mod: RT

## 2024-05-13 PROCEDURE — 80048 BASIC METABOLIC PNL TOTAL CA: CPT | Performed by: STUDENT IN AN ORGANIZED HEALTH CARE EDUCATION/TRAINING PROGRAM

## 2024-05-13 PROCEDURE — 85025 COMPLETE CBC W/AUTO DIFF WBC: CPT | Performed by: STUDENT IN AN ORGANIZED HEALTH CARE EDUCATION/TRAINING PROGRAM

## 2024-05-13 PROCEDURE — 99284 EMERGENCY DEPT VISIT MOD MDM: CPT | Performed by: STUDENT IN AN ORGANIZED HEALTH CARE EDUCATION/TRAINING PROGRAM

## 2024-05-13 PROCEDURE — 86140 C-REACTIVE PROTEIN: CPT | Performed by: STUDENT IN AN ORGANIZED HEALTH CARE EDUCATION/TRAINING PROGRAM

## 2024-05-13 ASSESSMENT — ACTIVITIES OF DAILY LIVING (ADL)
ADLS_ACUITY_SCORE: 35
ADLS_ACUITY_SCORE: 33
ADLS_ACUITY_SCORE: 35

## 2024-05-13 ASSESSMENT — COLUMBIA-SUICIDE SEVERITY RATING SCALE - C-SSRS
6. HAVE YOU EVER DONE ANYTHING, STARTED TO DO ANYTHING, OR PREPARED TO DO ANYTHING TO END YOUR LIFE?: NO
1. IN THE PAST MONTH, HAVE YOU WISHED YOU WERE DEAD OR WISHED YOU COULD GO TO SLEEP AND NOT WAKE UP?: NO
2. HAVE YOU ACTUALLY HAD ANY THOUGHTS OF KILLING YOURSELF IN THE PAST MONTH?: NO

## 2024-05-13 NOTE — CONSULTS
"Consult received for Vascular access care. For additional needs place \"Nursing to Consult for Vascular Access\" [id number] order in EPIC.  "

## 2024-05-13 NOTE — ED TRIAGE NOTES
Triage Assessment (Adult)       Row Name 05/13/24 1523          Triage Assessment    Airway WDL WDL        Respiratory WDL    Respiratory WDL WDL        Skin Circulation/Temperature WDL    Skin Circulation/Temperature WDL X  pain, swelling to right hand/wrist/arm        Cardiac WDL    Cardiac WDL WDL        Peripheral/Neurovascular WDL    Peripheral Neurovascular WDL WDL        Cognitive/Neuro/Behavioral WDL    Cognitive/Neuro/Behavioral WDL WDL

## 2024-05-13 NOTE — ED PROVIDER NOTES
"ED Provider Note  [facility]      History     Chief Complaint   Patient presents with    Wrist Pain     Seen a few days ago for hand/forearm/wrist pain following IV insertion in right wrist.  Was told to come in by her OB nurse to have her hand/wrist/forearm looked at again as she is still have \"excruciating\" pain in the extremity.  Taking tylenol at home with little relief of her pain.     The history is provided by the patient and medical records.     Cathy Mace is a 29 year old female who is currently pregnant at 12w5d by US who presents to the ED with right wrist pain following IV insertion.     Per chart review patient was initially seen in the [place] ED on 5/3 with abdominal pain where they had difficulty starting an IV and ended up putting one in the right forearm. She was then seen in the ED 5/9 with right lower arm pain near her wrist at the site of missed IV attempt. No swelling or ecchymosis noted. She had full passive ROM with complaints of worsening pain with ROM. Compartments soft, cap refill less than 3 seconds, no focal motor or sensory deficit.  Xray considered, but given pregnancy and no history of trauma was not done. Patient reported pain started since missed IV attempt -localized swelling and hematoma to the area so suspected likely pain secondary to hematoma/contusion.  Ultrasound was performed to rule out DVT and was negative. Supportive cares recommended and patient discharged.     Patient notes that since then her pain has been ongoing, excruciating and not relieved with more or less anything she does including Tylenol, and ice.  She states that at this point any movement of her wrist is severely painful, and she is been having some swelling especially on the back of her hand that does come down at times with ice.  Notes a sharp sensation and pain into the dorsum of her hand, and does continue to have tenderness and redness over the right wrist where the IV " puncture site was    Past Medical History  Past Medical History:   Diagnosis Date    Hypothyroidism (acquired) 03/20/2024    Post partum depression 03/20/2024    was on Lexapro, weaned from 20 mg to 5 mg and ended 04/03    Rectal fissure 03/20/2024     Past Surgical History:   Procedure Laterality Date    APPENDECTOMY  2023     acetaminophen (TYLENOL) 325 MG tablet  amoxicillin-clavulanate (AUGMENTIN) 875-125 MG tablet  ciprofloxacin-dexAMETHasone (CIPRODEX) 0.3-0.1 % otic suspension  doxylamine (UNISOM) 25 MG TABS tablet  famotidine (PEPCID) 20 MG tablet  levothyroxine (SYNTHROID/LEVOTHROID) 75 MCG tablet  metoclopramide (REGLAN) 5 MG tablet  ondansetron (ZOFRAN ODT) 4 MG ODT tab  Prenatal Vit-Fe Fumarate-FA (PRENATAL PLUS) 27-1 MG TABS      No Known Allergies  Family History  Family History   Problem Relation Age of Onset    No Known Problems Mother     No Known Problems Father     No Known Problems Sister     No Known Problems Brother     No Known Problems Brother     No Known Problems Maternal Grandmother         unknown    No Known Problems Maternal Grandfather         unknown    No Known Problems Paternal Grandmother         unknown    No Known Problems Paternal Grandfather         unknown     Social History   Social History     Tobacco Use    Smoking status: Never    Smokeless tobacco: Never   Substance Use Topics    Alcohol use: Never    Drug use: Never         A medically appropriate review of systems was performed with pertinent positives and negatives noted in the HPI, and all other systems negative.    Physical Exam   BP: 102/71  Pulse: 103  Temp: 97.9  F (36.6  C)  Resp: 16  Weight: 73 kg (161 lb)  SpO2: 99 %  Physical Exam  GEN: Well appearing, non toxic, cooperative  HEENT: normocephalic and atraumatic, PERRLA, EOMI  CV: well-perfused, normal skin color for ethnicity  PULM: breathing comfortably, in no respiratory distress  ABD: nondistended  EXT: Right wrist with a IV puncture site on the radial  dorsum side of her distal wrist with a small amount of erythema, and streaking of the area but no underlying fluctuance.  Significant limitation of range of motion of the wrist with only about 15 degrees of range tolerated, as well as significant tenderness throughout the entire wrist.  Intact sensation in the median, ulnar and radial distribution.  Intact strength of median ulnar and radial distribution.  NEURO: awake, conversant, grossly normal bilateral upper and lower extremity strength & ROM   SKIN: No rashes, ecchymosis, or lacerations  PSYCH: Calm and cooperative, interactive    ED Course, Procedures, & Data      Procedures          Results for orders placed or performed during the hospital encounter of 05/13/24   XR Wrist Right 3 Views     Status: None    Narrative    EXAM: XR WRIST RIGHT G/E 3 VIEWS  LOCATION: [facility]  DATE: 5/13/2024    INDICATION: severe wrist pain  COMPARISON: None.      Impression    IMPRESSION: Normal joint spaces and alignment. No fracture.   US Extremity Non Vascular Bilateral     Status: None    Narrative    EXAM: US EXTREMITY NON VASCULAR BILATERAL  LOCATION: [facility]  DATE: 5/13/2024    INDICATION: Right wrist pain after intravenous line insertion   COMPARISON: None.  TECHNIQUE: Routine.    FINDINGS: Normal sonographic evaluation of the right wrist in the area of swelling. No abnormalities.        Basic metabolic panel     Status: Abnormal   Result Value Ref Range    Sodium 134 (L) 135 - 145 mmol/L    Potassium 5.2 3.4 - 5.3 mmol/L    Chloride 101 98 - 107 mmol/L    Carbon Dioxide (CO2) 22 22 - 29 mmol/L    Anion Gap 11 7 - 15 mmol/L    Urea Nitrogen 5.3 (L) 6.0 - 20.0 mg/dL    Creatinine 0.44 (L) 0.51 - 0.95 mg/dL    GFR Estimate >90 >60 mL/min/1.73m2    Calcium 9.7 8.6 - 10.0 mg/dL    Glucose 84 70 - 99 mg/dL   Erythrocyte sedimentation rate auto     Status: Abnormal   Result Value  Ref Range    Erythrocyte Sedimentation Rate 46 (H) 0 - 20 mm/hr   CRP inflammation     Status: Abnormal   Result Value Ref Range    CRP Inflammation 20.14 (H) <5.00 mg/L   CBC with platelets and differential     Status: None   Result Value Ref Range    WBC Count 5.5 4.0 - 11.0 10e3/uL    RBC Count 4.80 3.80 - 5.20 10e6/uL    Hemoglobin 13.6 11.7 - 15.7 g/dL    Hematocrit 41.0 35.0 - 47.0 %    MCV 85 78 - 100 fL    MCH 28.3 26.5 - 33.0 pg    MCHC 33.2 31.5 - 36.5 g/dL    RDW 13.8 10.0 - 15.0 %    Platelet Count 220 150 - 450 10e3/uL    % Neutrophils 60 %    % Lymphocytes 34 %    % Monocytes 6 %    % Eosinophils 0 %    % Basophils 0 %    % Immature Granulocytes 0 %    NRBCs per 100 WBC 0 <1 /100    Absolute Neutrophils 3.3 1.6 - 8.3 10e3/uL    Absolute Lymphocytes 1.8 0.8 - 5.3 10e3/uL    Absolute Monocytes 0.3 0.0 - 1.3 10e3/uL    Absolute Eosinophils 0.0 0.0 - 0.7 10e3/uL    Absolute Basophils 0.0 0.0 - 0.2 10e3/uL    Absolute Immature Granulocytes 0.0 <=0.4 10e3/uL    Absolute NRBCs 0.0 10e3/uL   CBC with platelets differential     Status: None    Narrative    The following orders were created for panel order CBC with platelets differential.  Procedure                               Abnormality         Status                     ---------                               -----------         ------                     CBC with platelets and d...[[id number]]                      Final result                 Please view results for these tests on the individual orders.     Medications - No data to display  Labs Ordered and Resulted from Time of ED Arrival to Time of ED Departure   BASIC METABOLIC PANEL - Abnormal       Result Value    Sodium 134 (*)     Potassium 5.2      Chloride 101      Carbon Dioxide (CO2) 22      Anion Gap 11      Urea Nitrogen 5.3 (*)     Creatinine 0.44 (*)     GFR Estimate >90      Calcium 9.7      Glucose 84     ERYTHROCYTE SEDIMENTATION RATE AUTO - Abnormal    Erythrocyte Sedimentation Rate 46  (*)    CRP INFLAMMATION - Abnormal    CRP Inflammation 20.14 (*)    CBC WITH PLATELETS AND DIFFERENTIAL    WBC Count 5.5      RBC Count 4.80      Hemoglobin 13.6      Hematocrit 41.0      MCV 85      MCH 28.3      MCHC 33.2      RDW 13.8      Platelet Count 220      % Neutrophils 60      % Lymphocytes 34      % Monocytes 6      % Eosinophils 0      % Basophils 0      % Immature Granulocytes 0      NRBCs per 100 WBC 0      Absolute Neutrophils 3.3      Absolute Lymphocytes 1.8      Absolute Monocytes 0.3      Absolute Eosinophils 0.0      Absolute Basophils 0.0      Absolute Immature Granulocytes 0.0      Absolute NRBCs 0.0       US Extremity Non Vascular Bilateral   Final Result      XR Wrist Right 3 Views   Final Result   IMPRESSION: Normal joint spaces and alignment. No fracture.             Critical care was not performed.     Medical Decision Making  The patient's presentation was of moderate complexity (an undiagnosed new problem with uncertain diagnosis).    The patient's evaluation involved:  review of external note(s) from 3+ sources (see separate area of note for details)  review of 3+ test result(s) ordered prior to this encounter (see separate area of note for details)  ordering and/or review of 3+ test(s) in this encounter (see separate area of note for details)  discussion of management or test interpretation with another health professional (see separate area of note for details)    The patient's management necessitated moderate risk (prescription drug management including medications given in the ED).    Assessment & Plan    29-year-old female currently pregnant in her late first trimester presenting for department due to ongoing right wrist pain in the setting of recent IV access 5 days ago noted to have mild erythema around the puncture site, but more impressively significant pain with any range of motion of her wrist, and swelling over the dorsum of her wrist.    Did consider multiple different  possibilities including superficial thrombophlebitis, less likely DVT, cellulitis, abscess, septic joint.    Labs relatively reassuring aside from elevated inflammatory markers with CRP of 20, ESR of 46.  Due to her significant range of motion limitations, I had concern about septic arthritis, and spoke with orthopedic surgery due to the location of her joint.  X-ray is reassuring, ultrasound of the soft tissue does not they will plan to come and evaluate patient to determine needs arthrocentesis for further evaluation    10:20 PM patient evaluated by orthopedic surgery.  At this point in time they believe that she most likely has dequervain s tenosynovitis as a significant amount of her pain is related to her thumb, and any movements of her thumb is the most painful that she has.  She is able to range her wrist a bit more than she was originally.  With no white blood cell count elevation, no fevers here, at this point reassured that this is less likely to be septic joint.  Patient does have a splint at home.  Will plan for discharge and follow-up with the hand surgery clinic this week for reevaluation, possible steroid injection    I have reviewed the nursing notes. I have reviewed the findings, diagnosis, plan and need for follow up with the patient.    New Prescriptions    No medications on file       Final diagnoses:   De Quervain's tenosynovitis       Maggi Sainz MD  [facility] EMERGENCY DEPARTMENT  5/13/2024     Maggi Sainz MD  05/13/24 [id number]

## 2024-05-14 NOTE — DISCHARGE INSTRUCTIONS
You are seen emergency room due to wrist pain.  You had x-ray here, ultrasound, and lab work.  Overall all of your workup is reassuring although you do have some elevated inflammatory markers.    You were evaluated by the orthopedic surgery team here.  At this point in time we are concerned that you have inflammation of the sheath of tendons that holds your  ligament to your thumb.  This is typically a repetitive stress injury, however can happen more often in pregnancy and after postpartum.    We recommend that you wear the splint that you have, and continue to do what you have been doing at home including Tylenol and ice for pain.  We would recommend that you follow-up with the hand surgery clinic this week for reevaluation, possible steroid injection.    If you develop any severe worsening symptoms, including severe worsening pain, discoloration of your hand, fevers, numbness, or weakness we would recommend returning to emergency department.

## 2024-05-14 NOTE — ED NOTES
Patient provided discharge paperwork and instructions. Educated on ortho follow up and pain management. Agrees to learning. Provided reusable ice bags for home.

## 2024-05-14 NOTE — CONSULTS
Orthopaedic Surgery Consultation    Cathy Mace MRN# [id number]   Age: 29 year old YOB: 1995   Date of Admission:  5/13/2024    Reason for consult:  Right wrist pain   Requesting physician: No att. providers found   Level of consult: One-time consult to assist in determining a diagnosis and to recommend an appropriate treatment plan            Impression and Recommendation (Resident / Clinician):   Impression:  Cathy Mace is a right-hand dominant 29 year old female, currently 12 weeks pregnant who presents with right wrist pain, likely de Quervain's tenosynovitis.  Currently her presentation is most concerning for de Quervain's tenosynovitis as her pain is located primarily at the base of the thumb.  Infection seems less likely at this time given normal white blood cell count and no fever.  Strict return precautions regarding infectious symptoms were explained in detail prior to discharge.     Recomendations:  -Okay for weightbearing in the right upper extremity.  Recommended decreased usage of the right hand as able.  - Continuing with removable wrist bracing  - Continue OTC pain medication as needed  -Instructed to return to the emergency department if wrist begins to get warm, red, or should she spike a fever  Dispo: Per Primary team  Follow Up: Patient to follow up with Orthopedic surgery in 1 week. Ortho will coordinate scheduling follow up appointment.      Gerson Swain  Orthopaedic Surgery PGY-2  [phone]      Please page me directly with any questions/concerns during regular weekday hours before 5pm. If there is no response, if it is a weekend, or if it is during evening hours then please page the orthopaedic surgery resident on call.    Attestation:  This patient was discussed with Dr Grayson who agrees with the above.         Chief Complaint:   Right wrist pain          History of Present Illness (Resident / Clinician):   Cathy Mace is a right-hand dominant 29 year old female, 12  weeks pregnant, who presents with right wrist pain.   She recently had a left ear infection treated with amoxicillin and azithromycin approximately 2 weeks ago.  She was then seen in [place] emergency department on 5/3 with abdominal pain following completion of her antibiotics.  They had difficulty starting an IV in her right wrist and ended up placing 1 in the right forearm.  She was then seen in the ED on 5/9 with pain in the right wrist near the site of the initial IV attempt.  Since then she has had progressively worsening pain in the right wrist especially with range of motion.  She has been treating with bracing, Tylenol, and ice.  He has been intermittently helpful, however only for short period of time.  She notes that the pain radiates from the base of her thumb down her forearm to her elbow.  No numbness or tingling noted in the hand.  Denies trauma to the wrist.  Denies subjective fevers.     History obtained from patient interview and chart review.        Past Medical History:     Past Medical History:   Diagnosis Date     Hypothyroidism (acquired) 03/20/2024     Post partum depression 03/20/2024    was on Lexapro, weaned from 20 mg to 5 mg and ended 04/03     Rectal fissure 03/20/2024             Past Surgical History:     Past Surgical History:   Procedure Laterality Date     APPENDECTOMY  2023       Reviewed with patient       Social History:   Tobacco use: Denies  Alcohol use: Denies  Elicit drug use: Patient denies  Living situation: Lives with  and toddler          Family History:   No family history of anesthesia, bleeding or clotting complications.           Allergies:   No Known Allergies          Medications:   Medication reviewed with patient and in chart.  Anticoagulation: None  Antibiotics: None          Review of Systems:   A 12 point ROS was conducted and was otherwise negative except for HPI above.          Physical Exam:     /71   Pulse 103   Temp 97.9  F (36.6  C)  (Oral)   Resp 16   Wt 73 kg (161 lb)   LMP 02/14/2024 (Exact Date)   SpO2 99%   BMI 27.64 kg/m    General: Awake, alert, cooperative, no apparent distress, appears stated age  HEENT: Normocephalic, atraumatic, EOMI, no scleral icterus  Respiratory: Breathing non-labored, no wheezing  Cardiovascular: Nontachycardic  Skin: No rashes or lesions  Neurological: A&Ox3, CN II-XII grossly intact    Musculoskeletal:  RUE:   - No gross deformity. Skin intact.  Minor swelling to dorsal wrist  -Range of motion of the wrist severely limited by pain.  Patient refused active flexion, extension, radial/ulnar deviation of the wrist due to pain.  Passive flexion/extension of the wrist allowed to about 20 degrees.  - Positive Finkelstein's test  - Fires deltoid, biceps, triceps, wrist extension/flexion, , EPL, intrinsics, FPL with 5/5 strength.   - SILT in axillary, musculocutaneous, radial, ulnar, and median nerve distribution.   - Radial pulse 2+ and fingers wwp with BCR.            Imaging:   Review of x-rays of the right wrist from 5/13 demonstrate: No acute fracture or dislocation.  No effusion noted.         Laboratory date:   CBC:  Lab Results   Component Value Date    WBC 5.5 05/13/2024    HGB 13.6 05/13/2024     05/13/2024       BMP:  Lab Results   Component Value Date     (L) 05/13/2024    POTASSIUM 5.2 05/13/2024    CHLORIDE 101 05/13/2024    CO2 22 05/13/2024    BUN 5.3 (L) 05/13/2024    CR 0.44 (L) 05/13/2024    ANIONGAP 11 05/13/2024    DESIREE 9.7 05/13/2024    GLC 84 05/13/2024       Inflammatory Markers:  Lab Results   Component Value Date    WBC 5.5 05/13/2024    WBC 7.2 05/03/2024    WBC 6.3 04/25/2024    SED 46 (H) 05/13/2024        CRP Inflammation   Date Value Ref Range Status   05/13/2024 20.14 (H) <5.00 mg/L Final

## 2024-05-16 ENCOUNTER — HOSPITAL ENCOUNTER (OUTPATIENT)
Dept: ULTRASOUND IMAGING | Facility: CLINIC | Age: 29
Discharge: HOME | End: 2024-05-16
Attending: OBSTETRICS & GYNECOLOGY
Payer: COMMERCIAL

## 2024-05-16 ENCOUNTER — MEDICAL CORRESPONDENCE (OUTPATIENT)
Dept: HEALTH INFORMATION MANAGEMENT | Facility: CLINIC | Age: 29
End: 2024-05-16

## 2024-05-16 ENCOUNTER — OFFICE VISIT (OUTPATIENT)
Dept: MATERNAL FETAL MEDICINE | Facility: CLINIC | Age: 29
End: 2024-05-16
Attending: OBSTETRICS & GYNECOLOGY
Payer: COMMERCIAL

## 2024-05-16 ENCOUNTER — TRANSCRIBE ORDERS (OUTPATIENT)
Dept: MATERNAL FETAL MEDICINE | Facility: CLINIC | Age: 29
End: 2024-05-16

## 2024-05-16 ENCOUNTER — LAB (OUTPATIENT)
Dept: LAB | Facility: CLINIC | Age: 29
End: 2024-05-16
Attending: OBSTETRICS & GYNECOLOGY
Payer: COMMERCIAL

## 2024-05-16 DIAGNOSIS — Z36.9 FIRST TRIMESTER SCREENING: Primary | ICD-10-CM

## 2024-05-16 DIAGNOSIS — Z36.9 ENCOUNTER FOR ANTENATAL SCREENING: Primary | ICD-10-CM

## 2024-05-16 DIAGNOSIS — Z36.9 ENCOUNTER FOR ANTENATAL SCREENING: ICD-10-CM

## 2024-05-16 DIAGNOSIS — O26.90 PREGNANCY RELATED CONDITION, ANTEPARTUM: ICD-10-CM

## 2024-05-16 DIAGNOSIS — O26.90 PREGNANCY RELATED CONDITION, ANTEPARTUM: Primary | ICD-10-CM

## 2024-05-16 PROCEDURE — 36415 COLL VENOUS BLD VENIPUNCTURE: CPT

## 2024-05-16 PROCEDURE — 96040 HC GENETIC COUNSELING, EACH 30 MINUTES: CPT | Performed by: GENETIC COUNSELOR, MS

## 2024-05-16 PROCEDURE — 76813 OB US NUCHAL MEAS 1 GEST: CPT | Mod: 26 | Performed by: OBSTETRICS & GYNECOLOGY

## 2024-05-16 PROCEDURE — 76813 OB US NUCHAL MEAS 1 GEST: CPT

## 2024-05-16 NOTE — PROGRESS NOTES
"Please see \"imaging\" tab under \"Chart Review\" for details of today's US at the [facility].    Celio Stone MD  Maternal-Fetal Medicine    "

## 2024-05-16 NOTE — NURSING NOTE
Patient presents to [facility] for GC/NT US at 13w1d due to screening. Denies LOF, vaginal bleeding, cramping/contractions. SBAR given to ALEN FLORES, see their note in Epic.

## 2024-05-16 NOTE — PROGRESS NOTES
"[facility]  Genetic Counseling Consult    Patient:  Cathy Mace YOB: 1995   Date of Service:  24   MRN: [id number]    Cathy was seen at the [facility] [place] for genetic counseling consultation to discuss the options for testing for fetal chromosome abnormalities.  The patient was accompanied by her partner, Franky and their son to today's visit.     IMPRESSION/ PLAN   1. Cathy elected to proceed with NT ultrasound and Prequel NIPT through [facility]. She opted to screen for sex chromosome aneuploidies, and microdeletion conditions. Results are expected in 10-14 days. Cathy provided verbal permission for results to be left on her voicemail. She requested the results include predicted fetal sex information. Patient was informed that results will also be available via [employer].    PREGNANCY HISTORY   /Parity:    Age at Delivery: 29 year old  Gestational Age: 13w1d   VALENTINA: 2024, by Last Menstrual Period             No significant complications or exposures were reported in the current pregnancy.  Cathy's pregnancy history is significant for:    vaginal delivery at 36w4d     MEDICAL HISTORY   Cathy s reported medical history is not expected to impact pregnancy management or risks to fetal development.       FAMILY HISTORY   A three-generation pedigree was obtained today and is scanned under the \"Media\" tab in Epic. It is important to note that the family history provided is based on the patient's recollection and reported in the absence of medical records. If the family history changes or if more information is obtained, the patient should contact our clinic as this may alter recommendations.     The reported family history is unremarkable for multiple miscarriages, stillbirths, birth defects, intellectual disabilities, and known genetic conditions.    The couple shared that they are " consanguineous second cousins. We reviewed risks associated with consanguinity given degree of relation. We discussed that couples who are closely related are at increased risks to have children with autosomal recessive genetic disorders. The chance that they are both carriers for the same autosomal recessive genetic disease is higher than if they were not related. Additionally, consanginous couples all have a higher risk for birth defects in their offspring over the background 3-5% general population risk. We reviewed level II comprehensive ultrasound as a screening tool for birth defects in the current pregnancy. We also reviewed availability of expanded carrier screening, which was declined.        CARRIER SCREENING   Expanded carrier screening is available to screen for autosomal recessive conditions and X-linked conditions in a large list of genes. Autosomal recessive conditions happen when a mutation has been inherited from the egg and sperm and include conditions like cystic fibrosis, thalassemia, hearing loss, spinal muscular atrophy, and more. X-linked conditions happen when a mutation has been inherited from the egg and include conditions like fragile X syndrome. The couple did not elect to pursue the carrier screening options discussed today, however is aware these remain available. They were provided with a brochure regarding testing options and encouraged to reach out if interested.        RISK ASSESSMENT FOR CHROMOSOME CONDITIONS   We explained that the risk for fetal chromosome abnormalities increases with maternal age. We discussed specific features of common chromosome abnormalities, including Down syndrome, trisomy 13, trisomy 18, and sex chromosome trisomies.    At age 29 at midtrimester, the risk to have a baby with Down syndrome is 1 in 760.   At age 29 at midtrimester, the risk to have a baby with any chromosome abnormality is 1 in 380.     GENETIC TESTING OPTIONS   Genetic testing during a  pregnancy includes screening and diagnostic procedures.      We discussed the following screening options:   Non-invasive prenatal testing (NIPT)  Also called cell-free DNA screening because it detects chromosomes from the placenta in the pregnant person's blood  Can be done any time after 10 weeks gestation  Screens for trisomy 21, trisomy 18, trisomy 13, and sex chromosome aneuploidies  Cannot screen for open neural tube defects, maternal serum AFP after 15 weeks is recommended  We also discussed that current ACMG guidelines recommend that screening for 22q11.2 deletion syndrome be offered to all pregnant patients. 22q11.2 deletion syndrome has an estimated prevalence of 1 in 990 to 1 in 2148 (0.05-0.1%). Risk is not thought to increase with maternal age. Clinical features are variable but include congenital heart defects, cleft palate, developmental delays, immune system deficiencies, and hearing loss. Approximately 90% of cases are de nikko (a sporadic new change in a pregnancy). Cell-free DNA screening for 22q11.2 deletion syndrome is available with the inclusion of other microdeletion syndromes that are not currently recommended by society guidelines. We discussed the limitations of cell-free DNA screening in detecting microdeletions and the possiblity of false positives and false negatives.      We discussed the following ultrasound options:  Nuchal translucency (NT) ultrasound  Ultrasound between 26k1w-65y1t that includes nuchal translucency measurement and nasal bone assessments  Nuchal translucency refers to the space at the back of the neck where fluid builds up. All babies at this stage have fluid and there is only concern if there is too much fluid  Nasal bone refers to the small bone in the nose. There is concern for conditions like Down syndrome if the bone cannot be seen at all  This ultrasound can be done as part of first trimester screening, at the same time as another screen (NIPT), at the same  time as a CVS, or if the patients does not want genetic screening.  Markers on ultrasound detects about 70% of pregnancies with aneuploidy  Abnormalities on NT ultrasound can also increase the risk for a birth defect, like a heart defect    Comprehensive level II ultrasound (Fetal Anatomy Ultrasound)  Ultrasound done between 18-20 weeks gestation  Screens for major birth defects and markers for aneuploidy (like trisomy 21 and trisomy 18)  Includes looking at the fetus/baby's growth, heart, organs (stomach, kidneys), placenta, and amniotic fluid    We discussed the following diagnostic options:   Chorionic villus sampling (CVS)  Invasive diagnostic procedure done between 10w0d and 13w6d  The procedure collects a small sample from the placenta for the purpose of chromosomal testing and/or other genetic testing  Diagnostic result; more than 99% sensitivity for fetal chromosome abnormalities  Cannot screen for open neural tube defects, maternal serum AFP after 15 weeks is recommended    Amniocentesis  Invasive diagnostic procedure done after 15 weeks gestation  The procedure collects a small sample of amniotic fluid for the purpose of chromosomal testing and/or other genetic testing  Diagnostic result; more than 99% sensitivity for fetal chromosome abnormalities  Testing for AFP in the amniotic fluid can test for open neural tube defects    The benefits and limitations of noninvasive screening were reviewed. Screening tests provide a risk assessment (chance) specific to the pregnancy for certain fetal chromosome abnormalities but cannot definitively diagnose or exclude a fetal chromosome abnormality. Follow-up genetic counseling and consideration of diagnostic testing is recommended with any abnormal screening result. Diagnostic testing during a pregnancy is more certain and can test for more conditions. However, the tests do have a risk of miscarriage that requires careful consideration. These tests can detect fetal  chromosome abnormalities with greater than 99% certainty. Results can be compromised by maternal cell contamination or mosaicism and are limited by the resolution of current genetic testing technology. There is no screening or diagnostic test that detects all forms of birth defects or intellectual disability.     [facility]. Face-to-face time of the meeting was 30 minutes.    Krista Duque MS, [facility]  Licensed Genetic Counselor  [facility]  Maternal Fetal Medicine  Ph: [phone]  [email or web address]

## 2024-05-17 NOTE — TELEPHONE ENCOUNTER
DIAGNOSIS: WRIST PAIN   APPOINTMENT DATE: 05/20/2024   NOTES STATUS DETAILS   OFFICE NOTE from referring provider SELF    DISCHARGE REPORT from the ER Internal 5/13/2024, 5/9/2024  Formerly [facility]     (IMAGES & REPORTS) Internal

## 2024-05-20 ENCOUNTER — PRE VISIT (OUTPATIENT)
Dept: ORTHOPEDICS | Facility: CLINIC | Age: 29
End: 2024-05-20

## 2024-05-20 ENCOUNTER — OFFICE VISIT (OUTPATIENT)
Dept: ORTHOPEDICS | Facility: CLINIC | Age: 29
End: 2024-05-20
Payer: COMMERCIAL

## 2024-05-20 DIAGNOSIS — M65.4 DE QUERVAIN'S TENOSYNOVITIS: ICD-10-CM

## 2024-05-20 PROCEDURE — 99207 PR DROP WITH A PROCEDURE: CPT | Performed by: PHYSICIAN ASSISTANT

## 2024-05-20 PROCEDURE — 20550 NJX 1 TENDON SHEATH/LIGAMENT: CPT | Mod: RT | Performed by: PHYSICIAN ASSISTANT

## 2024-05-20 RX ORDER — LIDOCAINE HYDROCHLORIDE 10 MG/ML
1 INJECTION, SOLUTION EPIDURAL; INFILTRATION; INTRACAUDAL; PERINEURAL
Status: DISCONTINUED | OUTPATIENT
Start: 2024-05-20 | End: 2024-07-17

## 2024-05-20 RX ORDER — DEXAMETHASONE SODIUM PHOSPHATE 4 MG/ML
4 INJECTION, SOLUTION INTRA-ARTICULAR; INTRALESIONAL; INTRAMUSCULAR; INTRAVENOUS; SOFT TISSUE
Status: DISCONTINUED | OUTPATIENT
Start: 2024-05-20 | End: 2024-07-17

## 2024-05-20 RX ADMIN — LIDOCAINE HYDROCHLORIDE 1 ML: 10 INJECTION, SOLUTION EPIDURAL; INFILTRATION; INTRACAUDAL; PERINEURAL at 15:17

## 2024-05-20 RX ADMIN — DEXAMETHASONE SODIUM PHOSPHATE 4 MG: 4 INJECTION, SOLUTION INTRA-ARTICULAR; INTRALESIONAL; INTRAMUSCULAR; INTRAVENOUS; SOFT TISSUE at 15:17

## 2024-05-20 NOTE — NURSING NOTE
Reason For Visit:   Chief Complaint   Patient presents with    Consult     Right wrist pain       Primary MD: Aurora Andres  Ref. MD: Maggi Sainz    Age: 29 year old    ?  No        Pain Assessment  Patient Currently in Pain: Yes  Patient's Stated Pain Goal: 10    Hand Dominance Evaluation  Hand Dominance: Right          QuickDASH Assessment       No data to display                   Current Outpatient Medications   Medication Sig Dispense Refill    acetaminophen (TYLENOL) 325 MG tablet Take 325-650 mg by mouth every 6 hours as needed for mild pain      amoxicillin-clavulanate (AUGMENTIN) 875-125 MG tablet Take 1 tablet by mouth 2 times daily      ciprofloxacin-dexAMETHasone (CIPRODEX) 0.3-0.1 % otic suspension INSTILL 4 DROPS INTO LEFT EAR TWICE A DAY FOR 7 DAYS      doxylamine (UNISOM) 25 MG TABS tablet Take 20 mg by mouth at bedtime      famotidine (PEPCID) 20 MG tablet Take 20 mg by mouth 2 times daily      levothyroxine (SYNTHROID/LEVOTHROID) 75 MCG tablet Take 75 mcg by mouth daily      metoclopramide (REGLAN) 5 MG tablet Take 2 tablets (10 mg) by mouth 4 times daily as needed (nasuea/vomiting in pregnancy) 60 tablet 1    ondansetron (ZOFRAN ODT) 4 MG ODT tab Take 1 tablet by mouth 3 times daily      Prenatal Vit-Fe Fumarate-FA (PRENATAL PLUS) 27-1 MG TABS Take 1 tablet by mouth daily         No Known Allergies    Malissa Freeman

## 2024-05-20 NOTE — LETTER
5/20/2024         RE: Cathy Mace  [address]  [address]  [place] [address]        Dear Colleague,    Thank you for referring your patient, Cathy Mace, to the [facility] [place]. Please see a copy of my visit note below.    Hand Surgery History & Physical    REFERRING PHYSICIAN: Referred Self   PRIMARY CARE PHYSICIAN: Aurora Andres     Chief Complaint:   Consult (Right wrist pain)      History of Present Illness:     Cathy Mace is a 29 year old right-hand dominant female who presents for evaluation of right radial sided wrist pain.  Patient reports that this wrist pain started on 5/3/2024 after failed IV attempts on the radial aspect of her right wrist.  Patient reports that following this she had significant pain, swelling, bruising over the radial side of her wrist.  Pain did not improve.  She does subsequent ER visit on 5/9 and 5/13.  Workup was negative for infection or blood clot.  Orthopedics evaluated on 5/13 and suspected de Quervain's tenosynovitis.  She was given a thumb spica orthosis which she has been wearing full-time since the 13th.  She points to the radial side of the wrist as area of pain.  Swelling has significantly improved over the last week or so.  She has radial sided wrist pain with any wrist range of motion, worse with ulnar deviation.  She has been icing regularly and taking Tylenol as needed for pain.  Patient is currently 13 weeks pregnant and is unable to take NSAIDs.    Past Medical History:     Past Medical History:   Diagnosis Date    Hypothyroidism (acquired) 03/20/2024    Post partum depression 03/20/2024    was on Lexapro, weaned from 20 mg to 5 mg and ended 04/03    Rectal fissure 03/20/2024       Past Surgical History:     Past Surgical History:   Procedure Laterality Date    APPENDECTOMY  2023       Social History:     Social History     Tobacco Use    Smoking status: Never    Smokeless tobacco: Never   Substance Use Topics    Alcohol  use: Never       Family History:     Family History   Problem Relation Age of Onset    No Known Problems Mother     No Known Problems Father     No Known Problems Sister     No Known Problems Brother     No Known Problems Brother     No Known Problems Maternal Grandmother         unknown    No Known Problems Maternal Grandfather         unknown    No Known Problems Paternal Grandmother         unknown    No Known Problems Paternal Grandfather         unknown       Allergies:   No Known Allergies    Medications:     Current Outpatient Medications   Medication Sig Dispense Refill    acetaminophen (TYLENOL) 325 MG tablet Take 325-650 mg by mouth every 6 hours as needed for mild pain      amoxicillin-clavulanate (AUGMENTIN) 875-125 MG tablet Take 1 tablet by mouth 2 times daily      ciprofloxacin-dexAMETHasone (CIPRODEX) 0.3-0.1 % otic suspension INSTILL 4 DROPS INTO LEFT EAR TWICE A DAY FOR 7 DAYS      doxylamine (UNISOM) 25 MG TABS tablet Take 20 mg by mouth at bedtime      famotidine (PEPCID) 20 MG tablet Take 20 mg by mouth 2 times daily      levothyroxine (SYNTHROID/LEVOTHROID) 75 MCG tablet Take 75 mcg by mouth daily      metoclopramide (REGLAN) 5 MG tablet Take 2 tablets (10 mg) by mouth 4 times daily as needed (nasuea/vomiting in pregnancy) 60 tablet 1    ondansetron (ZOFRAN ODT) 4 MG ODT tab Take 1 tablet by mouth 3 times daily      Prenatal Vit-Fe Fumarate-FA (PRENATAL PLUS) 27-1 MG TABS Take 1 tablet by mouth daily       No current facility-administered medications for this visit.        Review of Systems:     A 10 point ROS was performed and reviewed. Specific responses to these questions are noted at the end of the document.    Physical Exam:   Physical Exam Adult:  Musculoskeletal: A focused physical examination of the right wrist reveals:   Inspection  -skin is clean dry and intact.  Mild edema over the radial aspect of the wrist.  No ecchymosis.  No erythema.  Palpation -tenderness palpation along the  first dorsal compartment, positive Finkelstein's.  Mild tenderness patient palpation over the thumb CMC.  Nontender over the dorsal wrist capsule.  No pain with axial loading of the wrist joint.  Nontender to the fovea.   Neurovascular- intact light touch sensation and motor to distribution of the median, ulnar and radial nerves. Brisk capillary refill to the distal fingers. 2+ radial pulse.   Range of Motion: Positive Finkelstein's.  Tolerates a 60 degree arc of motion of the wrist without significant pain.              Imaging:   3 view X-ray of the right wrist from 5/13 was independently interpreted by me. The results were discussed with the patient.  Findings include: No acute osseous abnormalities.    Assessment and Plan:   Assessment:  29 year old female approximately 13 weeks pregnant with right wrist dequervain tenosynovitis after failed IV attempt to the right wrist.     Plan: Discussed diagnosis and etiology of de Quervain's tenosynovitis as well as treatment options with the patient.  Conservative treatment would be a period of immobilization in a thumb spica brace, hand therapy, regular icing.  Unfortunately patient is unable to take NSAIDs due to current pregnancy.  Also could consider a corticosteroid injection to the first dorsal compartment.  We did discuss that steroid injections in the hand are relatively safe in pregnancy.  After full consideration of options patient elected proceed with a steroid injection today, referral to hand therapy.  Continue with thumb spica brace.  We did discuss that if she gets partial but incomplete relief of her symptoms could consider repeat steroid injection after 6 weeks.  Patient will follow-up as needed.    Procedure: First dorsal compartment steroid injection.  After informed consent was obtained, the skin was prepped with chloraprep. A mixture of 1 mL of 1% lidocaine and 1 mL (4 mg) of dexamethasone was injected into the patients right first dorsal compartment.  A bandaid was applied. Post-injection care instructions were given. The patient tolerated the procedure well.  Patient had improvement in her symptoms after injection.    Anastasiia Mckeon PA-C   Orthopedic Surgery      Hand / Upper Extremity Injection/Arthrocentesis: R extensor compartment 1    Date/Time: 2024 3:17 PM    Performed by: Anastasiia Mckeon PA-C  Authorized by: Jimbo Danielle MD    Indications:  Pain  Needle Size:  25 G  Guidance: landmark    Condition: de Quervain's      Site:  R extensor compartment 1  Medications:  4 mg dexAMETHasone 4 MG/ML; 1 mL lidocaine (PF) 1 %  Outcome:  Tolerated well, no immediate complications  Procedure discussed: discussed risks, benefits, and alternatives    Consent Given by:  Patient  Timeout: timeout called immediately prior to procedure    Prep: patient was prepped and draped in usual sterile fashion        [facility] [address] [address]  [phone]  Dept: [phone]  ______________________________________________________________________________    Patient: Cathy Mace   : 1995   MRN: [id number]   May 20, 2024    INVASIVE PROCEDURE SAFETY CHECKLIST    Date: 2024   Procedure:Right wrist De Quervain's steroid injection  Patient Name: Cathy Mace  MRN: [id number]  YOB: 1995    Action: Complete sections as appropriate. Any discrepancy results in a HARD COPY until resolved.     PRE PROCEDURE:  Patient ID verified with 2 identifiers (name and  or MRN): Yes  Procedure and site verified with patient/designee (when able): Yes  Accurate consent documentation in medical record: Yes  H&P (or appropriate assessment) documented in medical record: Yes  H&P must be up to 20 days prior to procedure and updates within 24 hours of procedure as applicable: Yes  Relevant diagnostic and radiology test results appropriately labeled and displayed as applicable: NA  Procedure  site(s) marked with provider initials: NA    TIMEOUT:  Time-Out performed immediately prior to starting procedure, including verbal and active participation of all team members addressing the following:Yes  * Correct patient identify  * Confirmed that the correct side and site are marked  * An accurate procedure consent form  * Agreement on the procedure to be done  * Correct patient position  * Relevant images and results are properly labeled and appropriately displayed  * The need to administer antibiotics or fluids for irrigation purposes during the procedure as applicable   * Safety precautions based on patient history or medication use    DURING PROCEDURE: Verification of correct person, site, and procedures any time the responsibility for care of the patient is transferred to another member of the care team.       The following medications were given:         Prior to injection, verified patient identity using patient's name and date of birth.  Due to injection administration, patient instructed to remain in clinic for 15 minutes  afterwards, and to report any adverse reaction to me immediately.    Tendon sheath injection was performed.   Medication Name: Dexamethasone Sodium [id number]/mL NDC 02886-840-20  Drug Amount Wasted:  None.  Vial/Syringe: Single dose vial  Expiration Date:  4/1/25    Medication Name Lidocaine 1% NDC 14264-280-78    Scribed by LLOYD HUNT, NESSA for   Anastasiia Mckeon PA-C  on May 20, 2024 at 3:21pm based on the provider's statements to me.     LLOYD HUNT, NESSA

## 2024-05-20 NOTE — PROGRESS NOTES
Hand Surgery History & Physical    REFERRING PHYSICIAN: Referred Self   PRIMARY CARE PHYSICIAN: Aurora Andres     Chief Complaint:   Consult (Right wrist pain)      History of Present Illness:     Cathy Mace is a 29 year old right-hand dominant female who presents for evaluation of right radial sided wrist pain.  Patient reports that this wrist pain started on 5/3/2024 after failed IV attempts on the radial aspect of her right wrist.  Patient reports that following this she had significant pain, swelling, bruising over the radial side of her wrist.  Pain did not improve.  She does subsequent ER visit on 5/9 and 5/13.  Workup was negative for infection or blood clot.  Orthopedics evaluated on 5/13 and suspected de Quervain's tenosynovitis.  She was given a thumb spica orthosis which she has been wearing full-time since the 13th.  She points to the radial side of the wrist as area of pain.  Swelling has significantly improved over the last week or so.  She has radial sided wrist pain with any wrist range of motion, worse with ulnar deviation.  She has been icing regularly and taking Tylenol as needed for pain.  Patient is currently 13 weeks pregnant and is unable to take NSAIDs.    Past Medical History:     Past Medical History:   Diagnosis Date    Hypothyroidism (acquired) 03/20/2024    Post partum depression 03/20/2024    was on Lexapro, weaned from 20 mg to 5 mg and ended 04/03    Rectal fissure 03/20/2024       Past Surgical History:     Past Surgical History:   Procedure Laterality Date    APPENDECTOMY  2023       Social History:     Social History     Tobacco Use    Smoking status: Never    Smokeless tobacco: Never   Substance Use Topics    Alcohol use: Never       Family History:     Family History   Problem Relation Age of Onset    No Known Problems Mother     No Known Problems Father     No Known Problems Sister     No Known Problems Brother     No Known Problems Brother     No Known Problems Maternal  Grandmother         unknown    No Known Problems Maternal Grandfather         unknown    No Known Problems Paternal Grandmother         unknown    No Known Problems Paternal Grandfather         unknown       Allergies:   No Known Allergies    Medications:     Current Outpatient Medications   Medication Sig Dispense Refill    acetaminophen (TYLENOL) 325 MG tablet Take 325-650 mg by mouth every 6 hours as needed for mild pain      amoxicillin-clavulanate (AUGMENTIN) 875-125 MG tablet Take 1 tablet by mouth 2 times daily      ciprofloxacin-dexAMETHasone (CIPRODEX) 0.3-0.1 % otic suspension INSTILL 4 DROPS INTO LEFT EAR TWICE A DAY FOR 7 DAYS      doxylamine (UNISOM) 25 MG TABS tablet Take 20 mg by mouth at bedtime      famotidine (PEPCID) 20 MG tablet Take 20 mg by mouth 2 times daily      levothyroxine (SYNTHROID/LEVOTHROID) 75 MCG tablet Take 75 mcg by mouth daily      metoclopramide (REGLAN) 5 MG tablet Take 2 tablets (10 mg) by mouth 4 times daily as needed (nasuea/vomiting in pregnancy) 60 tablet 1    ondansetron (ZOFRAN ODT) 4 MG ODT tab Take 1 tablet by mouth 3 times daily      Prenatal Vit-Fe Fumarate-FA (PRENATAL PLUS) 27-1 MG TABS Take 1 tablet by mouth daily       No current facility-administered medications for this visit.        Review of Systems:     A 10 point ROS was performed and reviewed. Specific responses to these questions are noted at the end of the document.    Physical Exam:   Physical Exam Adult:  Musculoskeletal: A focused physical examination of the right wrist reveals:   Inspection  -skin is clean dry and intact.  Mild edema over the radial aspect of the wrist.  No ecchymosis.  No erythema.  Palpation -tenderness palpation along the first dorsal compartment, positive Finkelstein's.  Mild tenderness patient palpation over the thumb CMC.  Nontender over the dorsal wrist capsule.  No pain with axial loading of the wrist joint.  Nontender to the fovea.   Neurovascular- intact light touch  sensation and motor to distribution of the median, ulnar and radial nerves. Brisk capillary refill to the distal fingers. 2+ radial pulse.   Range of Motion: Positive Finkelstein's.  Tolerates a 60 degree arc of motion of the wrist without significant pain.              Imaging:   3 view X-ray of the right wrist from 5/13 was independently interpreted by me. The results were discussed with the patient.  Findings include: No acute osseous abnormalities.    Assessment and Plan:   Assessment:  29 year old female approximately 13 weeks pregnant with right wrist dequervain tenosynovitis after failed IV attempt to the right wrist.     Plan: Discussed diagnosis and etiology of de Quervain's tenosynovitis as well as treatment options with the patient.  Conservative treatment would be a period of immobilization in a thumb spica brace, hand therapy, regular icing.  Unfortunately patient is unable to take NSAIDs due to current pregnancy.  Also could consider a corticosteroid injection to the first dorsal compartment.  We did discuss that steroid injections in the hand are relatively safe in pregnancy.  After full consideration of options patient elected proceed with a steroid injection today, referral to hand therapy.  Continue with thumb spica brace.  We did discuss that if she gets partial but incomplete relief of her symptoms could consider repeat steroid injection after 6 weeks.  Patient will follow-up as needed.    Procedure: First dorsal compartment steroid injection.  After informed consent was obtained, the skin was prepped with chloraprep. A mixture of 1 mL of 1% lidocaine and 1 mL (4 mg) of dexamethasone was injected into the patients right first dorsal compartment. A bandaid was applied. Post-injection care instructions were given. The patient tolerated the procedure well.  Patient had improvement in her symptoms after injection.    Anastasiia Mckeon PA-C   Orthopedic Surgery

## 2024-05-20 NOTE — PROGRESS NOTES
Hand / Upper Extremity Injection/Arthrocentesis: R extensor compartment 1    Date/Time: 2024 3:17 PM    Performed by: Anastasiia Mckeon PA-C  Authorized by: Jimbo Danielle MD    Indications:  Pain  Needle Size:  25 G  Guidance: landmark    Condition: de Quervain's      Site:  R extensor compartment 1  Medications:  4 mg dexAMETHasone 4 MG/ML; 1 mL lidocaine (PF) 1 %  Outcome:  Tolerated well, no immediate complications  Procedure discussed: discussed risks, benefits, and alternatives    Consent Given by:  Patient  Timeout: timeout called immediately prior to procedure    Prep: patient was prepped and draped in usual sterile fashion        [facility] ORTHOPEDIC [address]  [address] [facility] [id number] [phone]  Dept: [phone]  ______________________________________________________________________________    Patient: Cathy Mace   : 1995   MRN: [id number]   May 20, 2024    INVASIVE PROCEDURE SAFETY CHECKLIST    Date: 2024   Procedure:Right wrist De Quervain's steroid injection  Patient Name: Cathy Mace  MRN: [id number]  YOB: 1995    Action: Complete sections as appropriate. Any discrepancy results in a HARD COPY until resolved.     PRE PROCEDURE:  Patient ID verified with 2 identifiers (name and  or MRN): Yes  Procedure and site verified with patient/designee (when able): Yes  Accurate consent documentation in medical record: Yes  H&P (or appropriate assessment) documented in medical record: Yes  H&P must be up to 20 days prior to procedure and updates within 24 hours of procedure as applicable: Yes  Relevant diagnostic and radiology test results appropriately labeled and displayed as applicable: NA  Procedure site(s) marked with provider initials: NA    TIMEOUT:  Time-Out performed immediately prior to starting procedure, including verbal and active participation of all team members addressing the following:Yes  * Correct  patient identify  * Confirmed that the correct side and site are marked  * An accurate procedure consent form  * Agreement on the procedure to be done  * Correct patient position  * Relevant images and results are properly labeled and appropriately displayed  * The need to administer antibiotics or fluids for irrigation purposes during the procedure as applicable   * Safety precautions based on patient history or medication use    DURING PROCEDURE: Verification of correct person, site, and procedures any time the responsibility for care of the patient is transferred to another member of the care team.       The following medications were given:         Prior to injection, verified patient identity using patient's name and date of birth.  Due to injection administration, patient instructed to remain in clinic for 15 minutes  afterwards, and to report any adverse reaction to me immediately.    Tendon sheath injection was performed.   Medication Name: Dexamethasone Sodium Zpunzihyi8se/mL NDC 39425-126-46  Drug Amount Wasted:  None.  Vial/Syringe: Single dose vial  Expiration Date:  4/1/25    Medication Name Lidocaine 1% NDC 75471-951-33    Scribed by LLOYD HUNT, ATC for   Anastasiia Mckeon PA-C  on May 20, 2024 at 3:21pm based on the provider's statements to me.     LLOYD HUNT, ATC

## 2024-05-22 ENCOUNTER — THERAPY VISIT (OUTPATIENT)
Dept: OCCUPATIONAL THERAPY | Facility: CLINIC | Age: 29
End: 2024-05-22
Payer: COMMERCIAL

## 2024-05-22 ENCOUNTER — PRE VISIT (OUTPATIENT)
Dept: SURGERY | Facility: CLINIC | Age: 29
End: 2024-05-22

## 2024-05-22 ENCOUNTER — TELEPHONE (OUTPATIENT)
Dept: MATERNAL FETAL MEDICINE | Facility: CLINIC | Age: 29
End: 2024-05-22

## 2024-05-22 DIAGNOSIS — M65.4 DE QUERVAIN'S TENOSYNOVITIS: ICD-10-CM

## 2024-05-22 DIAGNOSIS — M25.531 RIGHT WRIST PAIN: Primary | ICD-10-CM

## 2024-05-22 LAB — SCANNED LAB RESULT: NORMAL

## 2024-05-22 PROCEDURE — 97760 ORTHOTIC MGMT&TRAING 1ST ENC: CPT | Mod: GO | Performed by: OCCUPATIONAL THERAPIST

## 2024-05-22 PROCEDURE — 97165 OT EVAL LOW COMPLEX 30 MIN: CPT | Mod: GO | Performed by: OCCUPATIONAL THERAPIST

## 2024-05-22 NOTE — PROGRESS NOTES
OCCUPATIONAL THERAPY EVALUATION  Type of Visit: Evaluation    See electronic medical record for Abuse and Falls Screening details.    Diagnosis: right radial wrist pain, DeQuervain's tenosynovitis    Precautions: Currently pregnant  Patient is Right hand dominant    Procedure: Right first dorsal compartment injection on 5/20/2023    Subjective      Presenting condition or subjective complaint:  As above   Date of onset: 05/20/24   about 3 weeks ago  Relevant medical history:   Please refer to electronic medical record.  Dates & types of surgery:  Please refer to electronic medical record.    Prior diagnostic imaging/testing results:     None  Prior therapy history for the same diagnosis, illness or injury:    OTC thumb spica brace    Prior Level of Function  Transfers: Independent  Ambulation: Independent  ADL: Independent  IADL: IND in all IADLs    Living Environment:   Patient is independent at home and there are no concerns about accessibility and mobility in the home.    Employment:      Hobbies/Interests:  likes cooking. Has been hard to chop food.    Patient goals for therapy:  Reduce pain and return to activities    Pain assessment: See objective evaluation for additional pain details     Objective     PAIN:  (Numerical Pain Rating Scale 0-10)  Pain level with use: 10    Pain location: right wrist to the radial side  Pain frequency: Often, especially with tasks  Pain is worst: daytime or nighttime  Pain is relieved by: cold and rest  Pain progression: Gradually getting better     EDEMA:  Per observations:  mild of the right  radial wrist (had been even more swollen)      SCAR/WOUND:  NA    SENSATION: No overt reports of difficulties related to sensation.    ROM:   Wrist ROM  Left AROM Right AROM    Extension 75 45   Flexion 85 21   Radial Deviation (RD) 31 15   Ulnar Deviation (UD) 58 30 sore   UD with Th Flex WNL. Hypermobility noted Can have thumb in palm, but 4-5/10 with UD   Supination WNL WNL    Pronation WNL WNL     ROM  Thumb 5/22/2024 5/22/2024   AROM  (PROM) L R   MP / WFL   IP / WFL   RABD 30 35   PABD 36 28, sore   Retropulsion     Kapandji Opposition Scale (0-10/10) 10 stretching 10 stretching     OBSERVATIONS/APPEARANCE: Very mild ecchymosis to the right radial/volar wrist    SPECIAL TESTS:  deferred, See hand ortho notes    Resisted Testing  Pain Report: - none  + mild    ++ moderate    +++ severe     Date     R     Thumb EPL No pain with AROM   Thumb EPB -   Thumb APL +     STRENGTH: Testing deferred due to pain    Assessment & Plan   CLINICAL IMPRESSIONS  Medical Diagnosis: Right de Quervain's tenosynovitis    Treatment Diagnosis: Right wrist pain    Impression/Assessment: Pt is a 29 year old female presenting to Occupational Therapy due to condition as noted above.  The following significant findings have been identified: Impaired activity tolerance, Impaired ROM, and Pain.  These identified deficits interfere with their ability to perform self care tasks, household chores, care of others, and meal planning and preparation as compared to previous level of function.     Clinical Decision Making (Complexity):  Assessment of Occupational Performance: 5 or more Performance Deficits  Occupational Performance Limitations: dressing, hygiene and grooming, care of others, home establishment and management, meal preparation and cleanup, shopping, and leisure activities  Clinical Decision Making (Complexity): Low complexity    PLAN OF CARE  Treatment Interventions:  Therapeutic Exercise:  AROM, Tendon Gliding, Isometrics, and Stabilization  Neuromuscular re-education:  Proprioceptive Training and Kinesiotaping  Manual Techniques:  Myofascial release and Manual edema mobilization  Orthotic Fabrication:  Static, Hand based, and Forearm based  Self Care:  Self Care Tasks and Ergonomic Considerations    Long Term Goals   OT Goal 1  Goal Identifier: Complete childcare tasks  Goal Description: Pt will perform  the above task with no pain or difficulty on 100% of trials in the span of a week, in order to return to baseline level of independent function in IADLs.  Target Date: 07/22/24      Frequency of Treatment: 2 times per month  Duration of Treatment: 2 months     Recommended Referrals to Other Professionals:  NA  Education Assessment: Learner/Method: Patient;No Barriers to Learning;Listening;Reading;Demonstration;Pictures/Video     Risks and benefits of evaluation/treatment have been explained.   Patient/Family/caregiver agrees with Plan of Care.     Evaluation Time:    OT Eval, Low Complexity Minutes (84182): 30       Signing Clinician: Soni Burnette OT

## 2024-05-22 NOTE — TELEPHONE ENCOUNTER
May 22, 2024  Left a message for Cathy regarding her Prequel  NIPT results.     Results indicate NO ANEUPLOIDY DETECTED for chromosomes 21, 18, 13, or sex chromosomes (XX). Testing for the select microdeletions was also included and screen negative. Predicted sex of baby was disclosed patient request.     This puts her current pregnancy at low risk for Down syndrome, trisomy 18, trisomy 13 and sex chromosome abnormalities. This test is reported to have the following sensitivities: Down syndrome: 99.7%, trisomy 18: 97.9%, and trisomy 13: 99.0%. Although these results are reassuring, this does not replace a standard chromosome analysis from a chorionic villus sampling or amniocentesis.      MSAFP is the appropriate second trimester screening test for open neural tube defects; the maternal quad screen is not recommended.     Her results are available in her Epic chart for her primary OB to review.     This information was left in Cathy's voicemail per plan established at her genetic counseling appointment, and Cathy was encouraged to reach out if she has any questions or concerns.     Krista Duque MS, [facility]  Licensed Genetic Counselor  [facility]  Maternal Fetal Medicine  Ph: [phone]  [email or web address]

## 2024-05-23 NOTE — TELEPHONE ENCOUNTER
Diagnosis, Referred by & from: Anal Fissure   Appt date: 8/19/2024   NOTES STATUS DETAILS   OFFICE NOTE from referring provider N/A    OFFICE NOTE from other specialist [facility]:  7/17/24 - OBGYN OV with Maura Carbajal CNM  4/25/24 - OBGYN OV with Hafsa Choi CNM    [facility] ER:  4/4/24 - [facility] OV with Dr. Andres    * Prior Records in [facility]    DISCHARGE SUMMARY from hospital N/A    DISCHARGE REPORT from the ER Internal [facility]:  5/3/24 - ED OV with Dr. Rojas  3/10/24 - ED OV with Dr. Bernstein  3/2/24 - ED OV with Dr. Naik   OPERATIVE REPORT N/A    MEDICATION LIST Internal    LABS N/A    DIAGNOSTIC PROCEDURES     COLONOSCOPY (most recent all time after 5 years) N/A [place]:  2021   IMAGING (DISC & REPORT) N/A

## 2024-05-24 ENCOUNTER — TELEPHONE (OUTPATIENT)
Dept: OBGYN | Facility: CLINIC | Age: 29
End: 2024-05-24

## 2024-05-24 ENCOUNTER — PRENATAL OFFICE VISIT (OUTPATIENT)
Dept: OBGYN | Facility: CLINIC | Age: 29
End: 2024-05-24
Attending: ADVANCED PRACTICE MIDWIFE
Payer: COMMERCIAL

## 2024-05-24 ENCOUNTER — LAB (OUTPATIENT)
Dept: LAB | Facility: CLINIC | Age: 29
End: 2024-05-24
Attending: ADVANCED PRACTICE MIDWIFE
Payer: COMMERCIAL

## 2024-05-24 VITALS
HEART RATE: 99 BPM | DIASTOLIC BLOOD PRESSURE: 72 MMHG | SYSTOLIC BLOOD PRESSURE: 103 MMHG | BODY MASS INDEX: 27.79 KG/M2 | WEIGHT: 162.8 LBS | HEIGHT: 64 IN

## 2024-05-24 DIAGNOSIS — Z34.82 ENCOUNTER FOR SUPERVISION OF OTHER NORMAL PREGNANCY IN SECOND TRIMESTER: Primary | ICD-10-CM

## 2024-05-24 DIAGNOSIS — E03.8 OTHER SPECIFIED HYPOTHYROIDISM: ICD-10-CM

## 2024-05-24 DIAGNOSIS — Z86.59 HX OF POSTPARTUM DEPRESSION, CURRENTLY PREGNANT: ICD-10-CM

## 2024-05-24 DIAGNOSIS — O99.891 HX OF POSTPARTUM DEPRESSION, CURRENTLY PREGNANT: ICD-10-CM

## 2024-05-24 DIAGNOSIS — O21.9 NAUSEA AND VOMITING IN PREGNANCY: ICD-10-CM

## 2024-05-24 LAB
T4 FREE SERPL-MCNC: 1.49 NG/DL (ref 0.9–1.7)
TSH SERPL DL<=0.005 MIU/L-ACNC: 2.78 UIU/ML (ref 0.3–4.2)

## 2024-05-24 PROCEDURE — 99213 OFFICE O/P EST LOW 20 MIN: CPT | Performed by: ADVANCED PRACTICE MIDWIFE

## 2024-05-24 PROCEDURE — 84443 ASSAY THYROID STIM HORMONE: CPT

## 2024-05-24 PROCEDURE — 36415 COLL VENOUS BLD VENIPUNCTURE: CPT

## 2024-05-24 PROCEDURE — 99207 PR PRENATAL VISIT: CPT | Performed by: ADVANCED PRACTICE MIDWIFE

## 2024-05-24 PROCEDURE — 84439 ASSAY OF FREE THYROXINE: CPT

## 2024-05-24 RX ORDER — METOCLOPRAMIDE 5 MG/1
10 TABLET ORAL 4 TIMES DAILY PRN
Qty: 60 TABLET | Refills: 1 | Status: SHIPPED | OUTPATIENT
Start: 2024-05-24 | End: 2024-08-28

## 2024-05-24 RX ORDER — LEVOTHYROXINE SODIUM 75 UG/1
75 TABLET ORAL DAILY
Qty: 90 TABLET | Refills: 0 | Status: SHIPPED | OUTPATIENT
Start: 2024-05-24 | End: 2024-08-13

## 2024-05-24 NOTE — TELEPHONE ENCOUNTER
"If pt reaches out about obtaining disability parking pass:    Per [place] statute section 49, subdivision 2, A pregnant person experiencing any of the conditions described in paragraph (f) (see below) is eligible for parking privileges pursuant to section 168.021 (the disability parking permit section.    (f) \"Physically disabled person\" means a person who:  (1) because of disability cannot walk without significant risk of falling;  (2) because of disability cannot walk 200 feet without stopping to rest;  (3) because of disability cannot walk without the aid of another person, a walker, a cane, crutches, braces, a prosthetic device, or a wheelchair;  (4) is restricted by a respiratory disease to such an extent that the person's forced (respiratory) expiratory volume for one second, when measured by spirometry, is less than one liter;  (5) has an arterial oxygen tension (PaO2) of less than 60 mm/Hg on room air at rest;  (6) uses portable oxygen;  (7) has a cardiac condition to the extent that the person's functional limitations are classified in severity as class III or class IV according to standards set by the American Heart Association;  (8) has lost an arm or a leg and does not have or cannot use an artificial limb; or  (9) has a disability that would be aggravated by walking 200 feet under normal environmental conditions to an extent that would be life threatening.; or  (10) is legally blind.    If pt has any of these conditions, she must fill out her part on this form: https://dps.[place].gov/divisions/dvs/forms-documents/documents/mv_disabilityparkingcertificate.pdf    And either drop it off or upload it over [employer] for us to complete the health care professional section. We will need to get this form back to her and she will need to fill out the [place] \"Request or Renew a Disability Parking Certificate\" online, upload that form, and pay the fee for a temporary disability pass.  "

## 2024-05-24 NOTE — PROGRESS NOTES
"Subjective:      29 year old  at 14w2d presents for a routine prenatal appointment.      No vaginal bleeding or leakage of fluid.  No contractions or cramping.    No fetal movement yet.       No HA, visual changes, RUQ or epigastric pain.  Concerns:Round ligament pain. Reviewed precautions.      Objective:  Vitals:    24 1457   BP: 103/72   Pulse: 99   Weight: 73.8 kg (162 lb 12.8 oz)   Height: 1.626 m (5' 4\")     See OB flowsheet    Assessment/Plan    Patient Active Problem List    Diagnosis Date Noted    Right wrist pain 2024     Priority: Medium    Encounter for supervision of other normal pregnancy in first trimester 2024     Priority: Medium     [facility] Patient Provider Group choice: CNM group- undecided  Partner's name: Franky  Employment: [employer]  [x]NOB folder  [x]Dating  [x] 1st trimester screening: MFM referral placed for 1st trimester screening place  [x]Fetal anatomy US ordered  [x] No added risk for PRE-E  [x] No increased risk for GDM  [x]No need for utox in labor  []COVID vaccine completed--no immunization info  []Pap- will do Postpartum    12-23wks________________________  []Offer AFP after 15 wks  [x]Rubella immune  [x]Hep B immune   [x]Varicella immune  []FLU shot    24-28wk_________________________  []EOB folder  []Labor plans:  []Prenatal Ed  []:  []Infant feeding plan  []Speed care provider choice  []PP Contraception plan: If tubal,consent date:  []TDAP   []Rhogam if needed, date:    29-35 wk________________________  []TOLAC consent done  [] Water birth interest  []GCT, passed  []RSV    36-37 wks______________________   [] GBS   []OTC PP meds sent  []PP recovery plans:  []Planning CS-ERAS pkt    38-42 wks______________________  []IOL reason/plans  []Postdates BPP      Hypothyroidism (acquired) 2024     Priority: Medium     Synthroid 75mg every day  24- TSH 3.89, T4 1.29.      Hx of postpartum depression, currently pregnant 2024     " Priority: Medium     was on Lexapro, weaned from 20 mg to 5 mg and ended 04/03       Rectal fissure 03/20/2024     Priority: Medium     3/10/24 ED visit         Orders Placed This Encounter   Procedures    TSH    Thyroxine, Free     -TSH, Free T4 ordered. Will adjust dose as needed on levothyroxine. Refilled reglan as well.  -Pt asked about handicap parking pass for pregnancy. I asked them to find out what paperwork was needed and sent a message to the Rns  Updated individualized prenatal care plan checklist on problemlist, reviewed relevant lab results   Return to clinic in 4 weeks and prn if questions or concerns.   Dorothea Argueta CNM

## 2024-05-24 NOTE — TELEPHONE ENCOUNTER
----- Message from Dorothea Argueta CNM sent at 5/24/2024  3:18 PM CDT -----  Regarding: parking pass for handicap  The patient was asking how to get a temporary handicap parking pass for pregnancy. I haven't ever heard anything about this. Do you know anything? She may send a [employer] message to ask.  Thanks,  Bianka

## 2024-06-26 ENCOUNTER — MYC REFILL (OUTPATIENT)
Dept: FAMILY MEDICINE | Facility: CLINIC | Age: 29
End: 2024-06-26

## 2024-06-26 DIAGNOSIS — Z34.81 ENCOUNTER FOR SUPERVISION OF OTHER NORMAL PREGNANCY IN FIRST TRIMESTER: Primary | ICD-10-CM

## 2024-06-27 ENCOUNTER — HOSPITAL ENCOUNTER (OUTPATIENT)
Dept: ULTRASOUND IMAGING | Facility: CLINIC | Age: 29
Discharge: HOME | End: 2024-06-27
Attending: ADVANCED PRACTICE MIDWIFE
Payer: COMMERCIAL

## 2024-06-27 ENCOUNTER — TELEPHONE (OUTPATIENT)
Dept: OBGYN | Facility: CLINIC | Age: 29
End: 2024-06-27
Payer: COMMERCIAL

## 2024-06-27 ENCOUNTER — OFFICE VISIT (OUTPATIENT)
Dept: MATERNAL FETAL MEDICINE | Facility: CLINIC | Age: 29
End: 2024-06-27
Attending: ADVANCED PRACTICE MIDWIFE
Payer: COMMERCIAL

## 2024-06-27 DIAGNOSIS — O26.90 PREGNANCY RELATED CONDITION, ANTEPARTUM: ICD-10-CM

## 2024-06-27 DIAGNOSIS — O09.899 SINGLE UMBILICAL ARTERY AFFECTING MANAGEMENT OF MOTHER IN SINGLETON PREGNANCY, ANTEPARTUM: ICD-10-CM

## 2024-06-27 DIAGNOSIS — O09.892 HISTORY OF PRETERM DELIVERY, CURRENTLY PREGNANT IN SECOND TRIMESTER: Primary | ICD-10-CM

## 2024-06-27 DIAGNOSIS — Z87.51 HISTORY OF PRETERM DELIVERY: ICD-10-CM

## 2024-06-27 PROCEDURE — 76817 TRANSVAGINAL US OBSTETRIC: CPT

## 2024-06-27 PROCEDURE — 76817 TRANSVAGINAL US OBSTETRIC: CPT | Mod: 26 | Performed by: OBSTETRICS & GYNECOLOGY

## 2024-06-27 PROCEDURE — 76811 OB US DETAILED SNGL FETUS: CPT | Mod: 26 | Performed by: OBSTETRICS & GYNECOLOGY

## 2024-06-27 PROCEDURE — 99214 OFFICE O/P EST MOD 30 MIN: CPT | Mod: 25 | Performed by: OBSTETRICS & GYNECOLOGY

## 2024-06-27 RX ORDER — VITAMIN A ACETATE, BETA CAROTENE, ASCORBIC ACID, CHOLECALCIFEROL, .ALPHA.-TOCOPHEROL ACETATE, DL-, THIAMINE MONONITRATE, RIBOFLAVIN, NIACINAMIDE, PYRIDOXINE HYDROCHLORIDE, FOLIC ACID, CYANOCOBALAMIN, CALCIUM CARBONATE, FERROUS FUMARATE, ZINC OXIDE, CUPRIC OXIDE 3080; 12; 120; 400; 1; 1.84; 3; 20; 22; 920; 25; 200; 27; 10; 2 [IU]/1; UG/1; MG/1; [IU]/1; MG/1; MG/1; MG/1; MG/1; MG/1; [IU]/1; MG/1; MG/1; MG/1; MG/1; MG/1
1 TABLET, FILM COATED ORAL DAILY
Qty: 90 TABLET | Refills: 2 | Status: SHIPPED | OUTPATIENT
Start: 2024-06-27

## 2024-06-27 NOTE — PROGRESS NOTES
The patient was seen for an ultrasound in the Maternal[facility]Fetal [facility] Center at the [facility] today.  For a detailed report of the ultrasound examination, please see the ultrasound report which can be found under the imaging tab.    If you have questions regarding today's evaluation or if we can be of further service, please contact the Maternal-Fetal Medicine Center.    Cyndi Montes De Oca MD  , OB/GYN  Maternal-Fetal Medicine  [phone] (Pager)

## 2024-06-27 NOTE — NURSING NOTE
Patient reports positive fetal movement, no pain, no contractions, leaking of fluid, or bleeding.   SBAR given to ALEN FLORES, see their note in Epic.

## 2024-06-29 PROBLEM — Q27.0 SINGLE UMBILICAL ARTERY: Status: ACTIVE | Noted: 2024-06-29

## 2024-07-11 ENCOUNTER — OFFICE VISIT (OUTPATIENT)
Dept: MATERNAL FETAL MEDICINE | Facility: CLINIC | Age: 29
End: 2024-07-11
Attending: OBSTETRICS & GYNECOLOGY
Payer: COMMERCIAL

## 2024-07-11 ENCOUNTER — HOSPITAL ENCOUNTER (OUTPATIENT)
Dept: ULTRASOUND IMAGING | Facility: CLINIC | Age: 29
Discharge: HOME | End: 2024-07-11
Attending: OBSTETRICS & GYNECOLOGY
Payer: COMMERCIAL

## 2024-07-11 DIAGNOSIS — O09.892 HISTORY OF PRETERM DELIVERY, CURRENTLY PREGNANT IN SECOND TRIMESTER: Primary | ICD-10-CM

## 2024-07-11 DIAGNOSIS — Z87.51 HISTORY OF PRETERM DELIVERY: ICD-10-CM

## 2024-07-11 PROCEDURE — 76817 TRANSVAGINAL US OBSTETRIC: CPT | Mod: 26 | Performed by: OBSTETRICS & GYNECOLOGY

## 2024-07-11 PROCEDURE — 76817 TRANSVAGINAL US OBSTETRIC: CPT

## 2024-07-11 NOTE — PROGRESS NOTES
"Please see \"Imaging\" tab under \"Chart Review\" for details of today's visit.    Dilan Larsen    "

## 2024-07-11 NOTE — NURSING NOTE
Patient presents to [facility] for TVUS at 21w1d due to history of PPROM w/ delivery . Denies LOF, vaginal bleeding, cramping/contractions. SBAR given to [facility] MD, see their note in Epic.

## 2024-07-12 ENCOUNTER — MYC MEDICAL ADVICE (OUTPATIENT)
Dept: OBGYN | Facility: CLINIC | Age: 29
End: 2024-07-12
Payer: COMMERCIAL

## 2024-07-12 NOTE — TELEPHONE ENCOUNTER
"S-(situation): Pt sent MyChart message with concerns about headache not relieved by Tylenol     B-(background): Pt is a , 21+ 2. \"No complications\" with first pregnancy per pt report     A-(assessment): Pt reports HA that has lasted x 24 hours, located in the front of the head. Pt reports pain is 9/10. Has been taking Tylenol 1000 mg \"last night, morning and afternoon\". Pt denied all other sx such as visual changes, SOB, chest pain, upper right belly pain. Pt denied increased activity and no one sick at home.     R-(recommendations): Paged On-Call Midwife Sandra. Provider recommended pt go to the Adult ED to be assessed and worked up based on pts gestational age. Writer attempted to reach pt to discuss recommendations, LVM with clinic number to call back at as well as sent MyChart message to pt with recommendations and to call clinic and/or [employer] message once pt has received the recommendation and message. Writer to call pt back if she doesn't hear from her.                       "

## 2024-07-12 NOTE — TELEPHONE ENCOUNTER
"Patient responded back to bensont messaged with \"ok\" about recommendation to go to [place] [facility] ED for further assessment and work up   "

## 2024-07-13 ENCOUNTER — APPOINTMENT (OUTPATIENT)
Dept: CT IMAGING | Facility: CLINIC | Age: 29
End: 2024-07-13
Attending: EMERGENCY MEDICINE
Payer: COMMERCIAL

## 2024-07-13 ENCOUNTER — HOSPITAL ENCOUNTER (EMERGENCY)
Facility: CLINIC | Age: 29
Discharge: HOME | End: 2024-07-13
Attending: EMERGENCY MEDICINE | Admitting: EMERGENCY MEDICINE
Payer: COMMERCIAL

## 2024-07-13 VITALS
DIASTOLIC BLOOD PRESSURE: 63 MMHG | RESPIRATION RATE: 16 BRPM | OXYGEN SATURATION: 98 % | HEART RATE: 78 BPM | TEMPERATURE: 97.6 F | SYSTOLIC BLOOD PRESSURE: 99 MMHG

## 2024-07-13 DIAGNOSIS — R51.9 NONINTRACTABLE HEADACHE, UNSPECIFIED CHRONICITY PATTERN, UNSPECIFIED HEADACHE TYPE: ICD-10-CM

## 2024-07-13 DIAGNOSIS — E03.9 HYPOTHYROIDISM (ACQUIRED): ICD-10-CM

## 2024-07-13 LAB
ALBUMIN SERPL BCG-MCNC: 3.6 G/DL (ref 3.5–5.2)
ALP SERPL-CCNC: 73 U/L (ref 40–150)
ALT SERPL W P-5'-P-CCNC: 15 U/L (ref 0–50)
ANION GAP SERPL CALCULATED.3IONS-SCNC: 12 MMOL/L (ref 7–15)
AST SERPL W P-5'-P-CCNC: 52 U/L (ref 0–45)
BASOPHILS # BLD AUTO: 0 10E3/UL (ref 0–0.2)
BASOPHILS NFR BLD AUTO: 0 %
BILIRUB SERPL-MCNC: 0.2 MG/DL
BUN SERPL-MCNC: 2.8 MG/DL (ref 6–20)
CALCIUM SERPL-MCNC: 8.7 MG/DL (ref 8.6–10)
CHLORIDE SERPL-SCNC: 102 MMOL/L (ref 98–107)
CREAT SERPL-MCNC: 0.42 MG/DL (ref 0.51–0.95)
DEPRECATED HCO3 PLAS-SCNC: 20 MMOL/L (ref 22–29)
EGFRCR SERPLBLD CKD-EPI 2021: >90 ML/MIN/1.73M2
EOSINOPHIL # BLD AUTO: 0 10E3/UL (ref 0–0.7)
EOSINOPHIL NFR BLD AUTO: 0 %
ERYTHROCYTE [DISTWIDTH] IN BLOOD BY AUTOMATED COUNT: 14.3 % (ref 10–15)
GLUCOSE SERPL-MCNC: 77 MG/DL (ref 70–99)
HCT VFR BLD AUTO: 36.4 % (ref 35–47)
HGB BLD-MCNC: 12.1 G/DL (ref 11.7–15.7)
IMM GRANULOCYTES # BLD: 0 10E3/UL
IMM GRANULOCYTES NFR BLD: 0 %
LYMPHOCYTES # BLD AUTO: 2.2 10E3/UL (ref 0.8–5.3)
LYMPHOCYTES NFR BLD AUTO: 33 %
MCH RBC QN AUTO: 29.2 PG (ref 26.5–33)
MCHC RBC AUTO-ENTMCNC: 33.2 G/DL (ref 31.5–36.5)
MCV RBC AUTO: 88 FL (ref 78–100)
MONOCYTES # BLD AUTO: 0.3 10E3/UL (ref 0–1.3)
MONOCYTES NFR BLD AUTO: 4 %
NEUTROPHILS # BLD AUTO: 4.2 10E3/UL (ref 1.6–8.3)
NEUTROPHILS NFR BLD AUTO: 63 %
NRBC # BLD AUTO: 0 10E3/UL
NRBC BLD AUTO-RTO: 0 /100
PLATELET # BLD AUTO: 176 10E3/UL (ref 150–450)
POTASSIUM SERPL-SCNC: 4.8 MMOL/L (ref 3.4–5.3)
PROT SERPL-MCNC: 6.8 G/DL (ref 6.4–8.3)
RBC # BLD AUTO: 4.14 10E6/UL (ref 3.8–5.2)
SODIUM SERPL-SCNC: 134 MMOL/L (ref 135–145)
WBC # BLD AUTO: 6.7 10E3/UL (ref 4–11)

## 2024-07-13 PROCEDURE — 258N000003 HC RX IP 258 OP 636: Performed by: EMERGENCY MEDICINE

## 2024-07-13 PROCEDURE — 96361 HYDRATE IV INFUSION ADD-ON: CPT | Performed by: EMERGENCY MEDICINE

## 2024-07-13 PROCEDURE — 70450 CT HEAD/BRAIN W/O DYE: CPT

## 2024-07-13 PROCEDURE — 99284 EMERGENCY DEPT VISIT MOD MDM: CPT | Mod: 25 | Performed by: EMERGENCY MEDICINE

## 2024-07-13 PROCEDURE — 250N000011 HC RX IP 250 OP 636: Performed by: EMERGENCY MEDICINE

## 2024-07-13 PROCEDURE — 99285 EMERGENCY DEPT VISIT HI MDM: CPT | Performed by: EMERGENCY MEDICINE

## 2024-07-13 PROCEDURE — 96375 TX/PRO/DX INJ NEW DRUG ADDON: CPT | Performed by: EMERGENCY MEDICINE

## 2024-07-13 PROCEDURE — 96374 THER/PROPH/DIAG INJ IV PUSH: CPT | Performed by: EMERGENCY MEDICINE

## 2024-07-13 PROCEDURE — 84443 ASSAY THYROID STIM HORMONE: CPT

## 2024-07-13 PROCEDURE — 80053 COMPREHEN METABOLIC PANEL: CPT | Performed by: EMERGENCY MEDICINE

## 2024-07-13 PROCEDURE — 36415 COLL VENOUS BLD VENIPUNCTURE: CPT | Performed by: EMERGENCY MEDICINE

## 2024-07-13 PROCEDURE — 85004 AUTOMATED DIFF WBC COUNT: CPT | Performed by: EMERGENCY MEDICINE

## 2024-07-13 RX ORDER — METOCLOPRAMIDE HYDROCHLORIDE 5 MG/ML
10 INJECTION INTRAMUSCULAR; INTRAVENOUS ONCE
Status: COMPLETED | OUTPATIENT
Start: 2024-07-13 | End: 2024-07-13

## 2024-07-13 RX ORDER — DIPHENHYDRAMINE HYDROCHLORIDE 50 MG/ML
25 INJECTION INTRAMUSCULAR; INTRAVENOUS ONCE
Status: COMPLETED | OUTPATIENT
Start: 2024-07-13 | End: 2024-07-13

## 2024-07-13 RX ADMIN — SODIUM CHLORIDE 1000 ML: 9 INJECTION, SOLUTION INTRAVENOUS at 14:01

## 2024-07-13 RX ADMIN — DIPHENHYDRAMINE HYDROCHLORIDE 25 MG: 50 INJECTION, SOLUTION INTRAMUSCULAR; INTRAVENOUS at 14:00

## 2024-07-13 RX ADMIN — METOCLOPRAMIDE HYDROCHLORIDE 10 MG: 5 INJECTION INTRAMUSCULAR; INTRAVENOUS at 14:00

## 2024-07-13 ASSESSMENT — ACTIVITIES OF DAILY LIVING (ADL)
ADLS_ACUITY_SCORE: 35
ADLS_ACUITY_SCORE: 33
ADLS_ACUITY_SCORE: 35

## 2024-07-13 ASSESSMENT — ENCOUNTER SYMPTOMS: HEADACHES: 1

## 2024-07-13 NOTE — ED PROVIDER NOTES
ED Provider Note  [facility]      History     Chief Complaint   Patient presents with    Headache       Headache    Cathy Mace is a 29-year-old woman 21 weeks pregnant presenting Emergency Department with headaches for 2 days.  Patient states that she has had a constant headache for 2 days, and she has tried over-the-counter Tylenol at home without much relief.  She is also nauseated and had 1 episode of nonbilious, nonbloody vomiting.  She has tried oral Reglan at home with the slightly relief for her nausea.  She called her clinic today and they directed her to come here for further evaluation.  She states she has had headaches with prior pregnancy as well, similar to this one.  No history of preeclampsia.  This pregnancy has been uneventful, other than the finding of a single umbilical artery on her anatomic scan at 20 weeks gestation.  No fever.  No recent illness.  No abdominal pain.  No pelvic pain.  No vaginal bleeding.  No dysuria.  No neck pain.  She has never had imaging of her brain in the past.  She does endorse history of migraine headaches in the past, however, has not had one for quite some time.      This part of the medical record was transcribed by Sharad Worthington, Medical Scribe, from a dictation done by Salazar Cote MD.     Past Medical History  Past Medical History:   Diagnosis Date    Hypothyroidism (acquired) 03/20/2024    Post partum depression 03/20/2024    was on Lexapro, weaned from 20 mg to 5 mg and ended 04/03    Rectal fissure 03/20/2024     Past Surgical History:   Procedure Laterality Date    APPENDECTOMY  2023     acetaminophen (TYLENOL) 325 MG tablet  amoxicillin-clavulanate (AUGMENTIN) 875-125 MG tablet  ciprofloxacin-dexAMETHasone (CIPRODEX) 0.3-0.1 % otic suspension  doxylamine (UNISOM) 25 MG TABS tablet  famotidine (PEPCID) 20 MG tablet  levothyroxine (SYNTHROID/LEVOTHROID) 75 MCG tablet  metoclopramide (REGLAN) 5 MG tablet  ondansetron (ZOFRAN  ODT) 4 MG ODT tab  Prenatal Vit-Fe Fumarate-FA (PRENATAL PLUS) 27-1 MG TABS      No Known Allergies  Family History  Family History   Problem Relation Age of Onset    No Known Problems Mother     No Known Problems Father     No Known Problems Sister     No Known Problems Brother     No Known Problems Brother     No Known Problems Maternal Grandmother         unknown    No Known Problems Maternal Grandfather         unknown    No Known Problems Paternal Grandmother         unknown    No Known Problems Paternal Grandfather         unknown     Social History   Social History     Tobacco Use    Smoking status: Never    Smokeless tobacco: Never   Substance Use Topics    Alcohol use: Never    Drug use: Never      Past medical history, past surgical history, medications, allergies, family history, and social history were reviewed with the patient. No additional pertinent items.   A medically appropriate review of systems was performed with pertinent positives and negatives noted in the HPI, and all other systems negative.    Physical Exam   BP: 108/72  Pulse: 78  Temp: 97.6  F (36.4  C)  Resp: 16  SpO2: 98 %  Physical Exam  Vitals and nursing note reviewed.   Constitutional:       General: She is not in acute distress.     Appearance: Normal appearance. She is not ill-appearing, toxic-appearing or diaphoretic.      Comments: Patient awake, alert, sitting in bed comfortably, appears in no acute distress.   HENT:      Head: Normocephalic and atraumatic.   Eyes:      Extraocular Movements: Extraocular movements intact.      Conjunctiva/sclera: Conjunctivae normal.      Pupils: Pupils are equal, round, and reactive to light.      Comments: No photophobia.   Neck:      Comments: No meningismus.  Cardiovascular:      Rate and Rhythm: Normal rate.      Heart sounds: Normal heart sounds.   Pulmonary:      Effort: Pulmonary effort is normal. No respiratory distress.      Breath sounds: Normal breath sounds.   Abdominal:       Palpations: Abdomen is soft.      Tenderness: There is no abdominal tenderness.   Musculoskeletal:         General: No tenderness. Normal range of motion.      Cervical back: Normal range of motion and neck supple.      Right lower leg: Edema (trace) present.      Left lower leg: Edema (trace) present.   Skin:     General: Skin is warm.      Findings: No rash.   Neurological:      General: No focal deficit present.      Mental Status: She is alert and oriented to person, place, and time.      GCS: GCS eye subscore is 4. GCS verbal subscore is 5. GCS motor subscore is 6.      Cranial Nerves: Cranial nerves 2-12 are intact. No cranial nerve deficit.      Sensory: Sensation is intact. No sensory deficit.      Motor: Motor function is intact. No weakness.      Coordination: Coordination is intact. Coordination normal.      Gait: Gait is intact. Gait normal.      Deep Tendon Reflexes: Reflexes normal.      Comments: No dysarthria, dysmetria, diplopia, disdiadochokinesia. Strength 5/5 in b/l UEs with  and b/l LEs with dorsi- and plantar-flexion against resistance. Sensation to light touch and 2-point discrimination intact in b/l distal UEs and LEs. CNs II-XII intact. Negative Romberg. Normal gait.     Psychiatric:         Mood and Affect: Mood normal.         Behavior: Behavior normal.         Thought Content: Thought content normal.         Judgment: Judgment normal.           ED Course, Procedures, & Data      Procedures                Results for orders placed or performed during the hospital encounter of 07/13/24   CT Head w/o Contrast     Status: None    Narrative    EXAM: CT HEAD W/O CONTRAST  LOCATION: [facility]  DATE: 7/13/2024    INDICATION: headaches, evaluate for space occupying lesion; Headache; Acute HA (< 3 months), no complicating features  COMPARISON: None.  TECHNIQUE: Routine CT Head without IV contrast. Multiplanar reformats. Dose reduction techniques  were used.    FINDINGS:  INTRACRANIAL CONTENTS: No intracranial hemorrhage, extraaxial collection, or mass effect.  No CT evidence of acute infarct. Normal parenchymal attenuation. Normal ventricles and sulci. Basal cisterns are patent. Normal positioning of the cerebellar   tonsils.    VISUALIZED ORBITS/SINUSES/MASTOIDS: No intraorbital abnormality. No paranasal sinus mucosal disease. No middle ear or mastoid effusion.    BONES/SOFT TISSUES: No acute abnormality.      Impression    IMPRESSION:    No CT evidence for acute intracranial abnormality or space-occupying mass lesion.   Comprehensive metabolic panel     Status: Abnormal   Result Value Ref Range    Sodium 134 (L) 135 - 145 mmol/L    Potassium 4.8 3.4 - 5.3 mmol/L    Carbon Dioxide (CO2) 20 (L) 22 - 29 mmol/L    Anion Gap 12 7 - 15 mmol/L    Urea Nitrogen 2.8 (L) 6.0 - 20.0 mg/dL    Creatinine 0.42 (L) 0.51 - 0.95 mg/dL    GFR Estimate >90 >60 mL/min/1.73m2    Calcium 8.7 8.6 - 10.0 mg/dL    Chloride 102 98 - 107 mmol/L    Glucose 77 70 - 99 mg/dL    Alkaline Phosphatase 73 40 - 150 U/L    AST 52 (H) 0 - 45 U/L    ALT 15 0 - 50 U/L    Protein Total 6.8 6.4 - 8.3 g/dL    Albumin 3.6 3.5 - 5.2 g/dL    Bilirubin Total 0.2 <=1.2 mg/dL   CBC with platelets and differential     Status: None   Result Value Ref Range    WBC Count 6.7 4.0 - 11.0 10e3/uL    RBC Count 4.14 3.80 - 5.20 10e6/uL    Hemoglobin 12.1 11.7 - 15.7 g/dL    Hematocrit 36.4 35.0 - 47.0 %    MCV 88 78 - 100 fL    MCH 29.2 26.5 - 33.0 pg    MCHC 33.2 31.5 - 36.5 g/dL    RDW 14.3 10.0 - 15.0 %    Platelet Count 176 150 - 450 10e3/uL    % Neutrophils 63 %    % Lymphocytes 33 %    % Monocytes 4 %    % Eosinophils 0 %    % Basophils 0 %    % Immature Granulocytes 0 %    NRBCs per 100 WBC 0 <1 /100    Absolute Neutrophils 4.2 1.6 - 8.3 10e3/uL    Absolute Lymphocytes 2.2 0.8 - 5.3 10e3/uL    Absolute Monocytes 0.3 0.0 - 1.3 10e3/uL    Absolute Eosinophils 0.0 0.0 - 0.7 10e3/uL    Absolute Basophils 0.0  0.0 - 0.2 10e3/uL    Absolute Immature Granulocytes 0.0 <=0.4 10e3/uL    Absolute NRBCs 0.0 10e3/uL   CBC with platelets differential     Status: None    Narrative    The following orders were created for panel order CBC with platelets differential.  Procedure                               Abnormality         Status                     ---------                               -----------         ------                     CBC with platelets and d...[[id number]]                      Final result                 Please view results for these tests on the individual orders.     Medications   sodium chloride 0.9% BOLUS 1,000 mL (0 mLs Intravenous Stopped 7/13/24 1517)   metoclopramide (REGLAN) injection 10 mg (10 mg Intravenous $Given 7/13/24 1400)   diphenhydrAMINE (BENADRYL) injection 25 mg (25 mg Intravenous $Given 7/13/24 1400)     Labs Ordered and Resulted from Time of ED Arrival to Time of ED Departure   COMPREHENSIVE METABOLIC PANEL - Abnormal       Result Value    Sodium 134 (*)     Potassium 4.8      Carbon Dioxide (CO2) 20 (*)     Anion Gap 12      Urea Nitrogen 2.8 (*)     Creatinine 0.42 (*)     GFR Estimate >90      Calcium 8.7      Chloride 102      Glucose 77      Alkaline Phosphatase 73      AST 52 (*)     ALT 15      Protein Total 6.8      Albumin 3.6      Bilirubin Total 0.2     CBC WITH PLATELETS AND DIFFERENTIAL    WBC Count 6.7      RBC Count 4.14      Hemoglobin 12.1      Hematocrit 36.4      MCV 88      MCH 29.2      MCHC 33.2      RDW 14.3      Platelet Count 176      % Neutrophils 63      % Lymphocytes 33      % Monocytes 4      % Eosinophils 0      % Basophils 0      % Immature Granulocytes 0      NRBCs per 100 WBC 0      Absolute Neutrophils 4.2      Absolute Lymphocytes 2.2      Absolute Monocytes 0.3      Absolute Eosinophils 0.0      Absolute Basophils 0.0      Absolute Immature Granulocytes 0.0      Absolute NRBCs 0.0       CT Head w/o Contrast   Final Result   IMPRESSION:      No CT  evidence for acute intracranial abnormality or space-occupying mass lesion.             Critical care was not performed.     Medical Decision Making  The patient's presentation was of high complexity (an acute health issue posing potential threat to life or bodily function).    The patient's evaluation involved:  review of external note(s) from 1 sources (see separate area of note for details)  review of 3+ test result(s) ordered prior to this encounter (see separate area of note for details)  ordering and/or review of 3+ test(s) in this encounter (see separate area of note for details)  independent interpretation of testing performed by another health professional (see separate area of note for details)    The patient's management necessitated high risk (a decision regarding hospitalization).    Assessment & Plan    29-year-old woman 21 weeks pregnant presenting with headache.  Differential diagnosis: Migraine headache, space-occupying lesion in the brain, unlikely preeclampsia, dehydration, electro normalities.    After thorough history physical exam patient appears to be in no acute distress.  Will obtain CT of the head and treated with a bolus of intravenous normal saline as well as IV Reglan, and IV Benadryl.  She agrees with the plan.    Laboratory studies returned with no leukocytosis, WBC is normal at 6700.  There is no anemia, hemoglobin is normal at 12.1.  Electrolytes show somewhat low creatinine at 0.42.  Potassium is slightly below low normal threshold at 134.  AST is minimally elevated at 52.  I reviewed patient's CT of the head and I read the radiology report; no evidence of space-occupying lesion.  Her symptoms have improved after ED treatment.  Her pressure is normal and I highly doubt preeclampsia.  She is stable for discharge with outpatient GYN follow-up.  She agrees with the plan and she agrees to return if her symptoms worsen.  Gait is normal at discharge.    I have reviewed the nursing notes.  I have reviewed the findings, diagnosis, plan and need for follow up with the patient.    New Prescriptions    No medications on file       Final diagnoses:   Nonintractable headache, unspecified chronicity pattern, unspecified headache type       Saalzar Cote MD  [facility] EMERGENCY DEPARTMENT  7/13/2024     Salazar Cote MD  07/13/24 [id number]

## 2024-07-13 NOTE — DISCHARGE INSTRUCTIONS
Please make an appointment to follow up with Your Primary Care Provider in 2 days for further evaluation.  Please take Tylenol for headache, if needed.  Please take metoclopramide for nausea, if needed.  If your symptoms significantly worsen please come back to the emergency department.

## 2024-07-13 NOTE — ED TRIAGE NOTES
Triage Assessment (Adult)       Row Name 07/13/24 1306          Triage Assessment    Airway WDL X;WDL        Respiratory WDL    Respiratory WDL WDL        Skin Circulation/Temperature WDL    Skin Circulation/Temperature WDL WDL        Cardiac WDL    Cardiac WDL WDL        Peripheral/Neurovascular WDL    Peripheral Neurovascular WDL WDL        Cognitive/Neuro/Behavioral WDL    Cognitive/Neuro/Behavioral WDL WDL

## 2024-07-13 NOTE — ED TRIAGE NOTES
21 weeks pregnant - Reports she has had a HA x 2 days told talked to her gyn who recommended her to go to urgent care.    Pt has a HA right now in the front between her brows and on the right sie of her head. Pt reports nausea since this morning as well. She states yesterday and the day before yesterday she was taking tylenol every 4-6 hours without relief.  Current HA is 9-10/10. She states in the last month her vision has been changing.  Pt states everything with this pregnancy has been good and she is feeling the baby move a lot.     Pt states with her first pregnancy she did have similar HA's.

## 2024-07-16 NOTE — PATIENT INSTRUCTIONS
"OK to try Excedrin Tension   Weeks 18 to 22 of Your Pregnancy: Care Instructions  At this stage you may find that your nausea and fatigue are gone. You may feel better overall and have more energy. But you might now also have some new discomforts, like sleep problems or leg cramps.    You may start to feel your baby move. These movements can feel like butterflies or bubbles.    Babies at this stage can now suck their thumbs.    Get some exercise every day.  And avoid caffeine late in the day.     Take a warm shower or bath before bed.  Try relaxation exercises to calm your mind and body.     Use extra pillows.  They can help you get comfortable.     Don't use sleeping pills or alcohol.  They could harm your baby.     For leg cramps, stretch and apply heat.  A warm bath, leg warmers, a heating pad, or a hot water bottle can help with muscle aches.   Stretches for leg cramps    Straighten your leg and bend your foot (flex your ankle) slowly upward, toward your knee. Bend your toes up and down.    Stand on a flat surface. Stretch your toes upward. For balance, hold on to the wall or something stable. If it feels okay, take small steps walking on your heels.  Follow-up care is a key part of your treatment and safety. Be sure to make and go to all appointments, and call your doctor if you are having problems. It's also a good idea to know your test results and keep a list of the medicines you take.  Where can you learn more?  Go to https://www.[employer].net/patiented  Enter W603 in the search box to learn more about \"Weeks 18 to 22 of Your Pregnancy: Care Instructions.\"  Current as of: July 10, 2023               Content Version: 14.0    1694-1878 [employer].   Care instructions adapted under license by your healthcare professional. If you have questions about a medical condition or this instruction, always ask your healthcare professional. [employer] disclaims any warranty or liability for " "your use of this information.      Weeks 22 to 26 of Your Pregnancy: Care Instructions  Your baby's lungs are getting ready for breathing. Your baby may respond to your voice. Your baby likely turns less, and kicks or jerks more. Jerking may mean that your baby has hiccups.    Think about taking childbirth classes. And start to think about whether you want to have pain medicine during labor.    At your next doctor visit, you may be tested for anemia and for high blood sugar that first occurs during pregnancy (gestational diabetes). These conditions can cause problems for you and your baby.    To ease discomfort, such as back pain    Change your position often. Try not to sit or stand for too long.  Get some exercise. Things like walking or stretching may help.  Try using a heating pad or cold pack.    To ease or reduce swelling in your feet, ankles, hands, and fingers    Take off your rings.  Avoid high-sodium foods, such as potato chips.  Prop up your feet, and sleep with pillows under your feet.  Try to avoid standing for long periods of time.  Do not wear tight shoes.  Wear support stockings.  Kegel exercises to prevent urine from leaking    Squeeze your muscles as if you were trying not to pass gas. Your belly, legs, and buttocks shouldn't move. Hold the squeeze for 3 seconds, then relax for 5 to 10 seconds.    Add 1 second each week until you can squeeze for 10 seconds. Repeat the exercise 10 times a session. Do 3 to 8 sessions a day. If these exercises cause you pain, stop doing them and talk with your doctor.  Follow-up care is a key part of your treatment and safety. Be sure to make and go to all appointments, and call your doctor if you are having problems. It's also a good idea to know your test results and keep a list of the medicines you take.  Where can you learn more?  Go to https://www.healthwise.net/patiented  Enter G264 in the search box to learn more about \"Weeks 22 to 26 of Your Pregnancy: Care " "Instructions.\"  Current as of: July 10, 2023               Content Version: 14.0    8268-6906 [employer].   Care instructions adapted under license by your healthcare professional. If you have questions about a medical condition or this instruction, always ask your healthcare professional. [employer] disclaims any warranty or liability for your use of this information.      Round Ligament Pain: Care Instructions  Overview     Round ligament pain is a common pain during pregnancy. You may feel a sharp brief pain on one or both sides of your belly. It may go down into your groin. It's usually felt for the first time during the second trimester. This pain is a normal part of pregnancy.  Your uterus is supported by two ligaments that go from the top and sides of the uterus to the bones of the pelvis. These are the round ligaments. As your uterus grows, these ligaments stretch and tighten with your movements. This may be the cause of the pain. You may find that certain activities seem to cause pain. If you can, avoid those activities.  Your doctor can usually diagnose round ligament pain from your symptoms and an exam. If you have bleeding or other symptoms, your doctor may also do an imaging test, such as an ultrasound. Your doctor may suggest some things that can help the pain, such as rest and strengthening exercises.  Follow-up care is a key part of your treatment and safety. Be sure to make and go to all appointments, and call your doctor if you are having problems. It's also a good idea to know your test results and keep a list of the medicines you take.  How can you care for yourself at home?  If certain movements seem to trigger belly pain, see if you can avoid them or try moving more slowly so the ligaments don't stretch quickly.  Stay active. If your doctor says it's okay, try moderate exercise. You might try things like swimming, walking, or stretching. Ask your doctor about " "strengthening and stretching exercises that may help.  Try a heating pad or cold pack on the area. A warm bath or shower may also help.  Rest when you can.  Ask your doctor about taking acetaminophen for pain. Be safe with medicines. Read and follow all instructions on the label.  Try a belly support band. Some people find that these can help.  When should you call for help?   Call your doctor now or seek immediate medical care if:    You think you might be in labor.     You have new or worse pain.   Watch closely for changes in your health, and be sure to contact your doctor if you have any problems.  Where can you learn more?  Go to https://www.[employer].net/patiented  Enter R110 in the search box to learn more about \"Round Ligament Pain: Care Instructions.\"  Current as of: July 10, 2023               Content Version: 14.0    2260-8795 [employer].   Care instructions adapted under license by your healthcare professional. If you have questions about a medical condition or this instruction, always ask your healthcare professional. [employer] disclaims any warranty or liability for your use of this information.      Leg and Ankle Edema: Care Instructions  Your Care Instructions  Swelling in the legs, ankles, and feet is called edema. It is common after you sit or stand for a while. Long plane flights or car rides often cause swelling in the legs and feet. You may also have swelling if you have to stand for long periods of time at your job. Problems with the veins in the legs (varicose veins) and changes in hormones can also cause swelling. Sometimes the swelling in the ankles and feet is caused by a more serious problem, such as heart failure, infection, blood clots, or liver or kidney disease.  Follow-up care is a key part of your treatment and safety. Be sure to make and go to all appointments, and call your doctor if you are having problems. It's also a good idea to know your test " "results and keep a list of the medicines you take.  How can you care for yourself at home?  If your doctor gave you medicine, take it as prescribed. Call your doctor if you think you are having a problem with your medicine.  Whenever you are resting, raise your legs up. Try to keep the swollen area higher than the level of your heart.  Take breaks from standing or sitting in one position.  Walk around to increase the blood flow in your lower legs.  Move your feet and ankles often while you stand, or tighten and relax your leg muscles.  Wear support stockings. Put them on in the morning, before swelling gets worse.  Eat a balanced diet. Lose weight if you need to.  Limit the amount of salt (sodium) in your diet. Salt holds fluid in the body and may increase swelling.  When should you call for help?   Call 911 anytime you think you may need emergency care. For example, call if:    You have symptoms of a blood clot in your lung (called a pulmonary embolism). These may include:  Sudden chest pain.  Trouble breathing.  Coughing up blood.   Call your doctor now or seek immediate medical care if:    You have signs of a blood clot, such as:  Pain in your calf, back of the knee, thigh, or groin.  Redness and swelling in your leg or groin.     You have symptoms of infection, such as:  Increased pain, swelling, warmth, or redness.  Red streaks or pus.  A fever.   Watch closely for changes in your health, and be sure to contact your doctor if:    Your swelling is getting worse.     You have new or worsening pain in your legs.     You do not get better as expected.   Where can you learn more?  Go to https://www.[employer].net/patiented  Enter N696 in the search box to learn more about \"Leg and Ankle Edema: Care Instructions.\"  Current as of: August 6, 2023               Content Version: 14.0    7955-7938 [employer].   Care instructions adapted under license by your healthcare professional. If you have questions " about a medical condition or this instruction, always ask your healthcare professional. [employer] disclaims any warranty or liability for your use of this information.      Back Pain During Pregnancy: Care Instructions  Overview     Back pain has many possible causes. It is often caused by problems with muscles and ligaments in your back. The extra weight during pregnancy can put stress on your back. Moving, lifting, standing, sitting, or sleeping in an awkward way also can strain your back. Back pain can also be a sign of labor. Although it may hurt a lot, back pain often improves on its own. Use good home treatment, and take care not to stress your back.  Follow-up care is a key part of your treatment and safety. Be sure to make and go to all appointments, and call your doctor if you are having problems. It's also a good idea to know your test results and keep a list of the medicines you take.  How can you care for yourself at home?  Ask your doctor about taking acetaminophen (Tylenol) for pain. Do not take aspirin, ibuprofen (Advil, Motrin), or naproxen (Aleve).  Do not take two or more pain medicines at the same time unless the doctor told you to. Many pain medicines have acetaminophen, which is Tylenol. Too much acetaminophen (Tylenol) can be harmful.  Try heat or ice, whichever feels better. Apply it for 10 to 20 minutes at a time, several times a day. Put a thin cloth between the heat or ice and your skin. A warm bath or shower may also help.  Lie on your left side with your knees and hips bent and a pillow between your legs. This reduces stress on your back.  Try to avoid standing or sitting for too long or heavy lifting. If your job requires lots of standing, sitting, or heavy lifting, ask your employer if you can take short breaks or adjust your work activity. You can ask your doctor to write a note requesting these breaks or other adjustments.  Wear supportive, low-heeled shoes. Avoid flat  "or high-heeled shoes.  Try a belly support band. Supporting your belly can take the strain off your back.  Ask your doctor about how much exercise you can do. Regular exercise such as swimming, water aerobics, walking, or stretching can help with back pain.  Ask your doctor about exercises to stretch, strengthen, and relax your muscles. Your doctor may recommend physical therapy.  When should you call for help?   Call your doctor now or seek immediate medical care if:    You've been having regular contractions for an hour. This means that you've had at least 6 contractions within 1 hour, even after you change your position and drink fluids.     You have new numbness in your buttocks, genital or rectal areas, or legs.     You have a new loss of bowel or bladder control.     You have symptoms of a urinary tract infection. These may include:  Pain or burning when you urinate.  A frequent need to urinate without being able to pass much urine.  Pain in the flank, which is just below the rib cage and above the waist on either side of the back.  Blood in your urine.   Watch closely for changes in your health, and be sure to contact your doctor if:    You do not get better as expected.   Where can you learn more?  Go to https://www.[employer].net/patiented  Enter C696 in the search box to learn more about \"Back Pain During Pregnancy: Care Instructions.\"  Current as of: July 10, 2023               Content Version: 14.0    8991-8656 [employer].   Care instructions adapted under license by your healthcare professional. If you have questions about a medical condition or this instruction, always ask your healthcare professional. [employer] disclaims any warranty or liability for your use of this information.       Labor: Care Instructions  Overview      labor is the start of labor between 20 and 36 weeks of pregnancy. Most babies are born at 37 to 42 weeks of pregnancy. In labor, the " uterus contracts to open the cervix. This is the first stage of childbirth.  labor can be caused by a problem with the baby, the mother, or both. Often the cause is not known.  In some cases, doctors use medicines to try to delay labor until 34 or more weeks of pregnancy. By this time, a baby has grown enough so that problems are not likely. In some cases--such as with a serious infection--it is healthier for the baby to be born early. Your treatment will depend on how far along you are in your pregnancy and on your health and your baby's health.  Follow-up care is a key part of your treatment and safety. Be sure to make and go to all appointments, and call your doctor if you are having problems. It's also a good idea to know your test results and keep a list of the medicines you take.  How can you care for yourself at home?  If your doctor prescribed medicines, take them exactly as directed. Call your doctor if you think you are having a problem with your medicine.  Rest until your doctor advises you about activity.  Do not have sexual intercourse unless your doctor says it is safe.  Use sanitary pads if you have vaginal bleeding. Using pads makes it easier to monitor your bleeding.  Do not smoke or allow others to smoke around you. If you need help quitting, talk to your doctor about stop-smoking programs and medicines. These can increase your chances of quitting for good.  When should you call for help?   Call 911  anytime you think you may need emergency care. For example, call if:    You passed out (lost consciousness).     You have a seizure.     You have severe vaginal bleeding.     You have severe pain in your belly or pelvis that doesn't get better between contractions.     You have had fluid gushing or leaking from your vagina and you know or think the umbilical cord is bulging into your vagina. If this happens, immediately get down on your knees so your rear end (buttocks) is higher than your head.  "This will decrease the pressure on the cord until help arrives.   Call your doctor now or seek immediate medical care if:    You have signs of preeclampsia, such as:  Sudden swelling of your face, hands, or feet.  New vision problems (such as dimness, blurring, or seeing spots).  A severe headache.     You have any vaginal bleeding.     You have belly pain or cramping.     You have a fever.     You have had regular contractions (with or without pain) for an hour. This means that you have 6 or more within 1 hour after you change your position and drink fluids.     You have a sudden release of fluid from the vagina.     You have low back pain or pelvic pressure that does not go away.     You notice that your baby has stopped moving or is moving much less than normal.   Watch closely for changes in your health, and be sure to contact your doctor if you have any problems.  Where can you learn more?  Go to https://www."[employer]net/patiented  Enter Q400 in the search box to learn more about \" Labor: Care Instructions.\"  Current as of: July 10, 2023               Content Version: 14.0    7012-6524 [employer].   Care instructions adapted under license by your healthcare professional. If you have questions about a medical condition or this instruction, always ask your healthcare professional. [employer] disclaims any warranty or liability for your use of this information.      "

## 2024-07-17 ENCOUNTER — PRENATAL OFFICE VISIT (OUTPATIENT)
Dept: OBGYN | Facility: CLINIC | Age: 29
End: 2024-07-17
Attending: ADVANCED PRACTICE MIDWIFE
Payer: COMMERCIAL

## 2024-07-17 VITALS
WEIGHT: 167 LBS | BODY MASS INDEX: 28.51 KG/M2 | HEART RATE: 83 BPM | HEIGHT: 64 IN | DIASTOLIC BLOOD PRESSURE: 61 MMHG | SYSTOLIC BLOOD PRESSURE: 91 MMHG

## 2024-07-17 DIAGNOSIS — E03.9 HYPOTHYROIDISM (ACQUIRED): ICD-10-CM

## 2024-07-17 DIAGNOSIS — O09.92 HRP (HIGH RISK PREGNANCY), SECOND TRIMESTER: Primary | ICD-10-CM

## 2024-07-17 LAB — TSH SERPL DL<=0.005 MIU/L-ACNC: 2.27 UIU/ML (ref 0.3–4.2)

## 2024-07-17 PROCEDURE — 99213 OFFICE O/P EST LOW 20 MIN: CPT | Performed by: ADVANCED PRACTICE MIDWIFE

## 2024-07-17 PROCEDURE — 99207 PR PRENATAL VISIT: CPT | Performed by: ADVANCED PRACTICE MIDWIFE

## 2024-07-17 ASSESSMENT — PAIN SCALES - GENERAL: PAINLEVEL: MILD PAIN (2)

## 2024-07-17 NOTE — PROGRESS NOTES
"Subjective:      29 year old  at 22w0d presents for a routine prenatal appointment.       no vaginal bleeding or leakage of fluid.  no contractions or cramping.    Has been feeling daily fetal movement.       No HA, visual changes, RUQ or epigastric pain.  Concerns:Has had a persistent headache, was seen in the ED.  Will try Excedrin Tension.  Would like blood pressure cuff Rx for home use-given today.  Has cervical length ultrasounds and growth scheduled at [facility] for SUA, and hx of pre-term birth.       Objective:  Vitals:    24 1445   BP: 91/61   Pulse: 83   Weight: 75.8 kg (167 lb)   Height: 1.626 m (5' 4.02\")     See OB flowsheet    Assessment/Plan    Patient Active Problem List    Diagnosis Date Noted    Single umbilical artery 2024     Priority: Medium     SUA, weekly BPP @ 36w      Right wrist pain 2024     Priority: Medium    Encounter for supervision of other normal pregnancy in first trimester 2024     Priority: Medium     [facility] Provider Group choice: CNM group- undecided  Partner's name: Franky  Employment: [employer]  [x]NOB folder  [x]Dating  [x] 1st trimester screening: [facility] referral placed for 1st trimester screening place  [x]Fetal anatomy US ordered  [x] No added risk for PRE-E  [x] No increased risk for GDM  [x]No need for utox in labor  []COVID vaccine completed--no immunization info  []Pap- will do Postpartum    12-23wks________________________  []Offer AFP after 15 wks  [x]Rubella immune  [x]Hep B immune   [x]Varicella immune  []FLU shot    24-28wk_________________________  []EOB folder  []Labor plans:  []Prenatal Ed  []:  []Infant feeding plan  [] care provider choice  []PP Contraception plan: If tubal,consent date:  []TDAP   []Rhogam if needed, date:    29-35 wk________________________  []TOLAC consent done  [] Water birth interest  []GCT, passed  []RSV    36-37 wks______________________   [] GBS   []OTC PP meds sent  []PP recovery " plans:  []Planning CS-ERAS pkt    38-42 wks______________________  []IOL reason/plans  []Postdates BPP      Hypothyroidism (acquired) 03/20/2024     Priority: Medium     Synthroid 75mg every day  4/25/24- TSH 3.89, T4 1.29.      Hx of postpartum depression, currently pregnant 03/20/2024     Priority: Medium     was on Lexapro, weaned from 20 mg to 5 mg and ended 04/03       Rectal fissure 03/20/2024     Priority: Medium     3/10/24 ED visit         Orders Placed This Encounter   Procedures    TSH with free T4 reflex       Updated individualized prenatal care plan checklist on problemlist, reviewed relevant lab results   Return to clinic in 4 weeks and prn if questions or concerns.  Discussed EOB next  RADHA Evans CNM

## 2024-07-22 ENCOUNTER — TELEPHONE (OUTPATIENT)
Dept: ORTHOPEDICS | Facility: CLINIC | Age: 29
End: 2024-07-22
Payer: COMMERCIAL

## 2024-07-22 ENCOUNTER — MYC MEDICAL ADVICE (OUTPATIENT)
Dept: OBGYN | Facility: CLINIC | Age: 29
End: 2024-07-22
Payer: COMMERCIAL

## 2024-07-22 ENCOUNTER — MYC MEDICAL ADVICE (OUTPATIENT)
Dept: ORTHOPEDICS | Facility: CLINIC | Age: 29
End: 2024-07-22
Payer: COMMERCIAL

## 2024-07-22 NOTE — TELEPHONE ENCOUNTER
Other: Requesting c/b- also sent [employer] msg- pain has come back.      Could we send this information to you in [employer] or would you prefer to receive a phone call?:   Patient would prefer a phone call   Okay to leave a detailed message?: No at Cell number on file:    Telephone Information:   Mobile [phone]

## 2024-07-23 ENCOUNTER — OFFICE VISIT (OUTPATIENT)
Dept: ORTHOPEDICS | Facility: CLINIC | Age: 29
End: 2024-07-23
Payer: COMMERCIAL

## 2024-07-23 ENCOUNTER — ANCILLARY PROCEDURE (OUTPATIENT)
Dept: GENERAL RADIOLOGY | Facility: CLINIC | Age: 29
End: 2024-07-23
Attending: FAMILY MEDICINE
Payer: COMMERCIAL

## 2024-07-23 ENCOUNTER — TELEPHONE (OUTPATIENT)
Dept: OBGYN | Facility: CLINIC | Age: 29
End: 2024-07-23

## 2024-07-23 DIAGNOSIS — M25.531 RIGHT WRIST PAIN: ICD-10-CM

## 2024-07-23 DIAGNOSIS — Z34.81 ENCOUNTER FOR SUPERVISION OF OTHER NORMAL PREGNANCY IN FIRST TRIMESTER: Primary | ICD-10-CM

## 2024-07-23 DIAGNOSIS — M65.4 DE QUERVAIN'S DISEASE (TENOSYNOVITIS): ICD-10-CM

## 2024-07-23 DIAGNOSIS — M25.531 RIGHT WRIST PAIN: Primary | ICD-10-CM

## 2024-07-23 DIAGNOSIS — Z33.1 PREGNANCY, INCIDENTAL: ICD-10-CM

## 2024-07-23 PROCEDURE — 99203 OFFICE O/P NEW LOW 30 MIN: CPT | Performed by: FAMILY MEDICINE

## 2024-07-23 PROCEDURE — 73110 X-RAY EXAM OF WRIST: CPT | Mod: RT | Performed by: RADIOLOGY

## 2024-07-23 NOTE — PROGRESS NOTES
ASSESSMENT/PLAN:    (M25.531) Right wrist pain  (primary encounter diagnosis)  Comment: unclear etiology of pain today; she is very tender and swollen in region of two small bony ossicles in dorsal wrist but no acute trauma; message sent to Dr Danielle to review films; his team will coordinate follow-up w/ pt; placed in brace for comfort; f/up prn with me  Plan: XR Wrist Right G/E 3 Views, Wrist/Arm/Hand Bracing Supplies Order Wrist Brace; Right; non-thumb spica          (Z33.1) Pregnancy, incidental  Comment:   Plan:     William Lance MD  July 23, 2024  12:48 PM        Pt is a 29 year old female here today for:     HPI:   Right Wrist/ Hand pain: Previously diagnosed w/ DeQuervain's; Relief from CSI on 5/20 for about 1.5 months, now pain is recurring.   Location: Right hand    Duration: 3 months    Trauma/ Fall? Began after multiple IV attempts on the right radial aspect of the wrist, now has migrated to the dorsal aspect of the right wrist.    Swelling? Yes    Numbness/ Tingling? Yes    Weakness? Yes    Imaging? Xr on 5/13/24   Treatment? Icing,heat, bracing, tylenol, and CSI on 5/20/24     Per Dr Danielle's note on 5/20/24:  Cathy Mace is a 29 year old right-hand dominant female who presents for evaluation of right radial sided wrist pain.  Patient reports that this wrist pain started on 5/3/2024 after failed IV attempts on the radial aspect of her right wrist.  Patient reports that following this she had significant pain, swelling, bruising over the radial side of her wrist.  Pain did not improve.  She does subsequent ER visit on 5/9 and 5/13.  Workup was negative for infection or blood clot.  Orthopedics evaluated on 5/13 and suspected de Quervain's tenosynovitis.  She was given a thumb spica orthosis which she has been wearing full-time since the 13th.  She points to the radial side of the wrist as area of pain.  Swelling has significantly improved over the last week or so.  She has radial sided wrist pain with any wrist  range of motion, worse with ulnar deviation.  She has been icing regularly and taking Tylenol as needed for pain.  Patient is currently 13 weeks pregnant and is unable to take NSAIDs.   Musculoskeletal: A focused physical examination of the right wrist reveals:   Inspection  -skin is clean dry and intact.  Mild edema over the radial aspect of the wrist.  No ecchymosis.  No erythema.  Palpation -tenderness palpation along the first dorsal compartment, positive Finkelstein's.  Mild tenderness patient palpation over the thumb CMC.  Nontender over the dorsal wrist capsule.  No pain with axial loading of the wrist joint.  Nontender to the fovea.   Neurovascular- intact light touch sensation and motor to distribution of the median, ulnar and radial nerves. Brisk capillary refill to the distal fingers. 2+ radial pulse.   Range of Motion: Positive Finkelstein's.  Tolerates a 60 degree arc of motion of the wrist without significant pain.  Assessment:  29 year old female approximately 13 weeks pregnant with right wrist dequervain tenosynovitis after failed IV attempt to the right wrist.     Plan: Discussed diagnosis and etiology of de Quervain's tenosynovitis as well as treatment options with the patient.  Conservative treatment would be a period of immobilization in a thumb spica brace, hand therapy, regular icing.  Unfortunately patient is unable to take NSAIDs due to current pregnancy.  Also could consider a corticosteroid injection to the first dorsal compartment.  We did discuss that steroid injections in the hand are relatively safe in pregnancy.  After full consideration of options patient elected proceed with a steroid injection today, referral to hand therapy.  Continue with thumb spica brace.  We did discuss that if she gets partial but incomplete relief of her symptoms could consider repeat steroid injection after 6 weeks.  Patient will follow-up as needed.     Procedure: First dorsal compartment steroid  injection.  After informed consent was obtained, the skin was prepped with chloraprep. A mixture of 1 mL of 1% lidocaine and 1 mL (4 mg) of dexamethasone was injected into the patients right first dorsal compartment. A bandaid was applied. Post-injection care instructions were given. The patient tolerated the procedure well.  Patient had improvement in her symptoms after injection.     Anastasiia Mckeon PA-C   Orthopedic Surgery    Past Medical History:   Diagnosis Date    Hypothyroidism (acquired) 03/20/2024    Post partum depression 03/20/2024    was on Lexapro, weaned from 20 mg to 5 mg and ended 04/03    Rectal fissure 03/20/2024      Past Surgical History:   Procedure Laterality Date    APPENDECTOMY  2023      Current Outpatient Medications   Medication Sig Dispense Refill    acetaminophen (TYLENOL) 325 MG tablet Take 325-650 mg by mouth every 6 hours as needed for mild pain (Patient not taking: Reported on 7/17/2024)      doxylamine (UNISOM) 25 MG TABS tablet Take 20 mg by mouth at bedtime      famotidine (PEPCID) 20 MG tablet Take 20 mg by mouth 2 times daily      levothyroxine (SYNTHROID/LEVOTHROID) 75 MCG tablet Take 1 tablet (75 mcg) by mouth daily for 90 days 90 tablet 0    metoclopramide (REGLAN) 5 MG tablet Take 2 tablets (10 mg) by mouth 4 times daily as needed (nasuea/vomiting in pregnancy) 60 tablet 1    ondansetron (ZOFRAN ODT) 4 MG ODT tab Take 1 tablet by mouth 3 times daily (Patient not taking: Reported on 7/17/2024)      Prenatal Vit-Fe Fumarate-FA (PRENATAL PLUS) 27-1 MG TABS Take 1 tablet by mouth daily 90 tablet 2      No Known Allergies   ROS:   Gen- no fevers/chills   Derm - no rash/ redness   Neuro - no numbness, no tingling   Remainder of ROS negative.     Exam:   LMP 02/14/2024 (Exact Date)        R WRIST/HAND:   Inspection: Swelling - YES - dorsal proximal hand; Atrophy - No   Sensation: intact in median, radial, ulnar distribution   ROM:   Limited in all planes due to pain   Strength:  deferred   Bony tenderness:   Wrist: Carpal bones: YES - dorsal in region of capitate/proximal 3rd metacarpal; Snuffbox: No; No TTP at radial styloid   Hand: Metacarpals: YES (see above); Phalanges: No   Tendons: intact  Maneuvers: -Finkelstein.  Other: No nodules palpated in palmar aspect.       U/S eval performed by Dr Sow confirms tenderness is region of two small bony ossicles; overlying tendons are normal; no fluid collection    Xray of R wrist on July 23, 2024 at [facility] location - films personally reviewed with patient at time of visit     My impression: two small ossicles noted dorsally in region of pain; previous x-ray with only one ossicle. otherwise normal x-ray

## 2024-07-23 NOTE — TELEPHONE ENCOUNTER
Late entry.    Returned page from patient. Pt reports increasing right wrist pain. Reports that she has been diagnosed this pregnancy with DeQuervians tenosynovitis. Was evaluated in the ED on 5/13- imaging reassuring, was seen by ortho and splint and possible steroid injection was recommended. Pt was then seen by ortho on 5/20 and received a steroid injection. She also was seen by occupational therapy on 5/22.     Pt reports that her right wrist is continuing to hurt, feeling worse today. States she sent [employer] messages to ortho and [facility] this afternoon/evening. Hurts to move it, tylenol does not relieve pain, ice makes it worse. Wanting to see ortho to inquire if another injection is warranted.     Continue relief measures, splinting.     Ortho referral placed, will also send message to ortho scheduling team.     Pt verbalized understanding and agrees with plan of care.     RADHA Alexis, CARISSA

## 2024-07-23 NOTE — LETTER
7/23/2024      RE: Cathy Mace  [address]  [address]  [place] [id number]     Dear Colleague,    Thank you for referring your patient, Cathy Mace, to the [facility] [place]. Please see a copy of my visit note below.    ASSESSMENT/PLAN:    (M25.531) Right wrist pain  (primary encounter diagnosis)  Comment: unclear etiology of pain today; she is very tender and swollen in region of two small bony ossicles in dorsal wrist but no acute trauma; message sent to Dr Danielle to review films; his team will coordinate follow-up w/ pt; placed in brace for comfort; f/up prn with me  Plan: XR Wrist Right G/E 3 Views, Wrist/Arm/Hand Bracing Supplies Order Wrist Brace; Right; non-thumb spica          (Z33.1) Pregnancy, incidental  Comment:   Plan:     William Lance MD  July 23, 2024  12:48 PM        Pt is a 29 year old female here today for:     HPI:   Right Wrist/ Hand pain: Previously diagnosed w/ DeQuervain's; Relief from CSI on 5/20 for about 1.5 months, now pain is recurring.   Location: Right hand    Duration: 3 months    Trauma/ Fall? Began after multiple IV attempts on the right radial aspect of the wrist, now has migrated to the dorsal aspect of the right wrist.    Swelling? Yes    Numbness/ Tingling? Yes    Weakness? Yes    Imaging? Xr on 5/13/24   Treatment? Icing,heat, bracing, tylenol, and CSI on 5/20/24     Per Dr Danielle's note on 5/20/24:  Cathy Mace is a 29 year old right-hand dominant female who presents for evaluation of right radial sided wrist pain.  Patient reports that this wrist pain started on 5/3/2024 after failed IV attempts on the radial aspect of her right wrist.  Patient reports that following this she had significant pain, swelling, bruising over the radial side of her wrist.  Pain did not improve.  She does subsequent ER visit on 5/9 and 5/13.  Workup was negative for infection or blood clot.  Orthopedics evaluated on 5/13 and suspected de Quervain's tenosynovitis.   She was given a thumb spica orthosis which she has been wearing full-time since the 13th.  She points to the radial side of the wrist as area of pain.  Swelling has significantly improved over the last week or so.  She has radial sided wrist pain with any wrist range of motion, worse with ulnar deviation.  She has been icing regularly and taking Tylenol as needed for pain.  Patient is currently 13 weeks pregnant and is unable to take NSAIDs.   Musculoskeletal: A focused physical examination of the right wrist reveals:   Inspection  -skin is clean dry and intact.  Mild edema over the radial aspect of the wrist.  No ecchymosis.  No erythema.  Palpation -tenderness palpation along the first dorsal compartment, positive Finkelstein's.  Mild tenderness patient palpation over the thumb CMC.  Nontender over the dorsal wrist capsule.  No pain with axial loading of the wrist joint.  Nontender to the fovea.   Neurovascular- intact light touch sensation and motor to distribution of the median, ulnar and radial nerves. Brisk capillary refill to the distal fingers. 2+ radial pulse.   Range of Motion: Positive Finkelstein's.  Tolerates a 60 degree arc of motion of the wrist without significant pain.  Assessment:  29 year old female approximately 13 weeks pregnant with right wrist dequervain tenosynovitis after failed IV attempt to the right wrist.     Plan: Discussed diagnosis and etiology of de Quervain's tenosynovitis as well as treatment options with the patient.  Conservative treatment would be a period of immobilization in a thumb spica brace, hand therapy, regular icing.  Unfortunately patient is unable to take NSAIDs due to current pregnancy.  Also could consider a corticosteroid injection to the first dorsal compartment.  We did discuss that steroid injections in the hand are relatively safe in pregnancy.  After full consideration of options patient elected proceed with a steroid injection today, referral to hand therapy.   Continue with thumb spica brace.  We did discuss that if she gets partial but incomplete relief of her symptoms could consider repeat steroid injection after 6 weeks.  Patient will follow-up as needed.     Procedure: First dorsal compartment steroid injection.  After informed consent was obtained, the skin was prepped with chloraprep. A mixture of 1 mL of 1% lidocaine and 1 mL (4 mg) of dexamethasone was injected into the patients right first dorsal compartment. A bandaid was applied. Post-injection care instructions were given. The patient tolerated the procedure well.  Patient had improvement in her symptoms after injection.     Anastasiia Mckeon PA-C   Orthopedic Surgery    Past Medical History:   Diagnosis Date     Hypothyroidism (acquired) 03/20/2024     Post partum depression 03/20/2024    was on Lexapro, weaned from 20 mg to 5 mg and ended 04/03     Rectal fissure 03/20/2024      Past Surgical History:   Procedure Laterality Date     APPENDECTOMY  2023      Current Outpatient Medications   Medication Sig Dispense Refill     acetaminophen (TYLENOL) 325 MG tablet Take 325-650 mg by mouth every 6 hours as needed for mild pain (Patient not taking: Reported on 7/17/2024)       doxylamine (UNISOM) 25 MG TABS tablet Take 20 mg by mouth at bedtime       famotidine (PEPCID) 20 MG tablet Take 20 mg by mouth 2 times daily       levothyroxine (SYNTHROID/LEVOTHROID) 75 MCG tablet Take 1 tablet (75 mcg) by mouth daily for 90 days 90 tablet 0     metoclopramide (REGLAN) 5 MG tablet Take 2 tablets (10 mg) by mouth 4 times daily as needed (nasuea/vomiting in pregnancy) 60 tablet 1     ondansetron (ZOFRAN ODT) 4 MG ODT tab Take 1 tablet by mouth 3 times daily (Patient not taking: Reported on 7/17/2024)       Prenatal Vit-Fe Fumarate-FA (PRENATAL PLUS) 27-1 MG TABS Take 1 tablet by mouth daily 90 tablet 2      No Known Allergies   ROS:   Gen- no fevers/chills   Derm - no rash/ redness   Neuro - no numbness, no tingling    Remainder of ROS negative.     Exam:   LMP 02/14/2024 (Exact Date)        R WRIST/HAND:   Inspection: Swelling - YES - dorsal proximal hand; Atrophy - No   Sensation: intact in median, radial, ulnar distribution   ROM:   Limited in all planes due to pain   Strength: deferred   Bony tenderness:   Wrist: Carpal bones: YES - dorsal in region of capitate/proximal 3rd metacarpal; Snuffbox: No; No TTP at radial styloid   Hand: Metacarpals: YES (see above); Phalanges: No   Tendons: intact  Maneuvers: -Finkelstein.  Other: No nodules palpated in palmar aspect.       U/S eval performed by Dr Sow confirms tenderness is region of two small bony ossicles; overlying tendons are normal; no fluid collection    Xray of R wrist on July 23, 2024 at [facility] location - films personally reviewed with patient at time of visit     My impression: two small ossicles noted dorsally in region of pain; previous x-ray with only one ossicle. otherwise normal x-ray       Again, thank you for allowing me to participate in the care of your patient.      Sincerely,    William Lance MD

## 2024-07-24 ENCOUNTER — HOSPITAL ENCOUNTER (OUTPATIENT)
Dept: ULTRASOUND IMAGING | Facility: CLINIC | Age: 29
Discharge: HOME | End: 2024-07-24
Attending: OBSTETRICS & GYNECOLOGY
Payer: COMMERCIAL

## 2024-07-24 ENCOUNTER — TELEPHONE (OUTPATIENT)
Dept: ORTHOPEDICS | Facility: CLINIC | Age: 29
End: 2024-07-24
Payer: COMMERCIAL

## 2024-07-24 ENCOUNTER — OFFICE VISIT (OUTPATIENT)
Dept: MATERNAL FETAL MEDICINE | Facility: CLINIC | Age: 29
End: 2024-07-24
Attending: OBSTETRICS & GYNECOLOGY
Payer: COMMERCIAL

## 2024-07-24 DIAGNOSIS — O09.892 HISTORY OF PRETERM DELIVERY, CURRENTLY PREGNANT IN SECOND TRIMESTER: Primary | ICD-10-CM

## 2024-07-24 DIAGNOSIS — Z87.51 HISTORY OF PRETERM DELIVERY: ICD-10-CM

## 2024-07-24 PROCEDURE — 76817 TRANSVAGINAL US OBSTETRIC: CPT | Mod: 26 | Performed by: OBSTETRICS & GYNECOLOGY

## 2024-07-24 PROCEDURE — 76817 TRANSVAGINAL US OBSTETRIC: CPT

## 2024-07-24 NOTE — TELEPHONE ENCOUNTER
Patient is asking for a call from Dr. Danielle's team regarding severe hand/wrist pain. Patient is asking for a call to [phone], any time, okay to leave detailed msg.

## 2024-07-24 NOTE — PROGRESS NOTES
"Please see \"Imaging\" tab under \"Chart Review\" for details of today's US at the [facility].    Celio Stone MD  Maternal-Fetal Medicine    "

## 2024-07-24 NOTE — TELEPHONE ENCOUNTER
1:57 PM Spoke with patient. She is still experiencing a lot of pain in her right wrist and hand. No known injury.  Her right hand has a stabbing severe pain in it.  It is kind of swollen also.     She saw Dr Lance in Ortho yesterday for evaluation and he sent a message to Dr Danielle for review.  She had XR yesterday.     She is pregnant so limited to what she can take for pain.  Currently on Tylenol only, she is icing and elevating and wearing a brace.  Pain doesn't seem changed since yesterday.     We did schedule a follow up with Dr Danielle on Monday 7-29-24. RN will let Dr Danielle know she is calling, and if we need to do anything between now and then we will let her know.  We do not have other MD's in the office the rest of this week that could see her but if the pain continues to worsen instructed her to call us ASAP for instructions. If overnight the pain becomes very severe she may need to head to the ER.  Patient verbalizes understanding. Latoya Jiang RN

## 2024-07-25 ENCOUNTER — ANCILLARY PROCEDURE (OUTPATIENT)
Dept: MRI IMAGING | Facility: CLINIC | Age: 29
End: 2024-07-25
Attending: STUDENT IN AN ORGANIZED HEALTH CARE EDUCATION/TRAINING PROGRAM
Payer: COMMERCIAL

## 2024-07-25 DIAGNOSIS — M25.531 RIGHT WRIST PAIN: ICD-10-CM

## 2024-07-25 DIAGNOSIS — M25.531 RIGHT WRIST PAIN: Primary | ICD-10-CM

## 2024-07-25 LAB — RADIOLOGIST FLAGS: NORMAL

## 2024-07-25 PROCEDURE — 73221 MRI JOINT UPR EXTREM W/O DYE: CPT | Mod: RT | Performed by: RADIOLOGY

## 2024-07-25 NOTE — RESULT ENCOUNTER NOTE
Reviewed at time of visit. Pt has appt pending w/ Dr Danielle on 7/29/24; He has ordered an MRI as well.    William Lance MD  7/25/2024  2:11 PM

## 2024-07-28 ENCOUNTER — HEALTH MAINTENANCE LETTER (OUTPATIENT)
Age: 29
End: 2024-07-28

## 2024-07-29 ENCOUNTER — OFFICE VISIT (OUTPATIENT)
Dept: ORTHOPEDICS | Facility: CLINIC | Age: 29
End: 2024-07-29
Payer: COMMERCIAL

## 2024-07-29 DIAGNOSIS — M25.531 RIGHT WRIST PAIN: Primary | ICD-10-CM

## 2024-07-29 PROCEDURE — 99203 OFFICE O/P NEW LOW 30 MIN: CPT | Performed by: STUDENT IN AN ORGANIZED HEALTH CARE EDUCATION/TRAINING PROGRAM

## 2024-07-29 NOTE — LETTER
7/29/2024      Cathy Mace  [address]  [address]  [place] [address]      Dear Colleague,    Thank you for referring your patient, Cathy Mace, to the [facility] [place]. Please see a copy of my visit note below.    Hand Surgery Progress Note    REFERRING PHYSICIAN: Referred Self   PRIMARY CARE PHYSICIAN: Aurora Andres     Chief Complaint:   RECHECK (Follow-up right hand and wrist pain )    Interval 7/29/24:  Patient returns today with pain at new location of the wrist. Located at the dorsal central carpus over the capitate and 2nd/3rd CMC joints. She was previously seen for DeQuervain's tenosynovitis and received an injection 5/20/24 which relieved those symptoms. She states this pain started last week which prompted her to seek care with Dr. Lance. Later in the interview she states it started 3 weeks ago which worsened last week. She denies any trauma or injury. X-rays were obtained last week demonstrating possible new ossicles dorsal wrist. Dr. Lance reached out to me, his note documents significant tenderness and swelling. MRI was ordered and we fit her onto the schedule today for evaluation. She has been using the brace and tylenol, not able to take NSAIDs    History of Present Illness:     Cathy Mace is a 29 year old right-hand dominant female who presents for evaluation of right radial sided wrist pain.  Patient reports that this wrist pain started on 5/3/2024 after failed IV attempts on the radial aspect of her right wrist.  Patient reports that following this she had significant pain, swelling, bruising over the radial side of her wrist.  Pain did not improve.  She does subsequent ER visit on 5/9 and 5/13.  Workup was negative for infection or blood clot.  Orthopedics evaluated on 5/13 and suspected de Quervain's tenosynovitis.  She was given a thumb spica orthosis which she has been wearing full-time since the 13th.  She points to the radial side of the wrist as area  of pain.  Swelling has significantly improved over the last week or so.  She has radial sided wrist pain with any wrist range of motion, worse with ulnar deviation.  She has been icing regularly and taking Tylenol as needed for pain.  Patient is currently 13 weeks pregnant and is unable to take NSAIDs.       Review of Systems:     A 10 point ROS was performed and reviewed. Specific responses to these questions are noted at the end of the document.    Physical Exam:   Physical Exam Adult:  Musculoskeletal: A focused physical examination of the right wrist reveals:   Inspection  -skin is clean dry and intact. No swelling or tenderness over 1st dorsal compartment tendons at radial styloid. Mild edema over dorsal central wrist. No ecchymosis.  No erythema.  Palpation -tenderness palpation over the capitate and 3rd CMC joint. No previous tenderness over dorsal wrist capsule. Some discomfort with resisted MF extension. Full flexion/extension with wrist flexed, pain with wrist extension  Negative Finkelstein's.    Neurovascular- intact light touch sensation and motor to distribution of the median, ulnar and radial nerves. Brisk capillary refill to the distal fingers. 2+ radial pulse. Able to flex/extend all fingers. Able to make a fist  Range of Motion: Able to tolerate 50 deg wrist flexion, passive wrist extension is painful.               Imaging:   3 view X-ray of the right wrist from 7/23/24 compared to 5/13, independently interpreted by me. The results were discussed with the patient.  Findings include: new tiny ossicle over the dorsal 2nd/3rd CMC joints, prior large ossicle again visualized, unclear significance, concerning for trauma    MRI right wrist 7/25/24  IMPRESSION:  1.  No evidence for fracture.  2.  Thickening and intermediate signal within the extensor retinaculum along the dorsal aspect of the wrist could be reactive or secondary to sprain.  3.  Stretch or sprain type injury to the scapholunate ligament  without tearing or diastasis of the joint interval.  4.  Nonspecific reactive edema along the radial aspect of the hand and dorsal aspect of the hand.  5.  Exam otherwise negative.    Assessment and Plan:   Assessment:  29 year old female here with dorsal wrist pain     Plan:   I reviewed the radiographic and MRI findings in detail with the patient and her .  The swelling and sprain of the dorsal capsule over the capitate and CMC joint raises concern for trauma.  The patient denies this.    I advised that these findings are reassuring and that no major ligamentous or tendinous injury has occurred.  I do think that the symptoms will resolve with rest and time. She should continue using the brace during activities. I advised that while the patient may be a candidate for an injection if symptoms persist, I did not recommend it at this time given the acute onset (1 to 3 weeks) of symptoms. Steroids now may interfere with patient's own healing.    Patient was frustrated to hear this as symptoms have been quite limiting to ADLs. She was expecting an injection and ultimately she still asked to have an injection today. I was willing to do this at their request but recommended doing so under fluoroscopic guidance to make sure injection was targeted to the correct area.     Mini c-arm was in use during the visit. The patient waited for some time but elected to leave as the wait at that point was about 20 minutes.     I will contact Dr. Lance and my sports medicine colleagues to see if they would be willing to do this under ultrasound guidance.     All questions answered.    Jimbo Danielle MD    Hand & Upper Extremity Surgery  Department of Orthopedic Surgery  [facility]        Again, thank you for allowing me to participate in the care of your patient.        Sincerely,        Jimbo Danielle MD

## 2024-07-29 NOTE — NURSING NOTE
Reason For Visit:   Chief Complaint   Patient presents with    RECHECK     Follow-up right hand and wrist pain        Primary MD: Aurora Andres  Ref. MD: Jason    Age: 29 year old    ?  No      LMP 02/14/2024 (Exact Date)       Pain Assessment  Patient Currently in Pain: Yes  0-10 Pain Scale: 10  Primary Pain Location: Hand (Right hand and wrist)  Pain Descriptors: Sharp, Stabbing, Aching, Constant               QuickDASH Assessment      7/29/2024     7:52 AM   QuickDASH Main   1. Open a tight or new jar Severe difficulty   2. Do heavy household chores (e.g., wash walls, floors) Severe difficulty   3. Carry a shopping bag or briefcase Severe difficulty   4. Wash your back Severe difficulty   5. Use a knife to cut food Severe difficulty   6. Recreational activities in which you take some force or impact through your arm, shoulder or hand (e.g., golf, hammering, tennis, etc.) Severe difficulty   7. During the past week, to what extent has your arm, shoulder or hand problem interfered with your normal social activities with family, friends, neighbours or groups Extremely   8. During the past week, were you limited in your work or other regular daily activities as a result of your arm, shoulder or hand problem Unable   9. Arm, shoulder or hand pain Severe   10.Tingling (pins and needles) in your arm,shoulder or hand Severe   11. During the past week, how much difficulty have you had sleeping because of the pain in your arm, shoulder or hand Severe difficulty   Quickdash Ability Score 79.55          Current Outpatient Medications   Medication Sig Dispense Refill    acetaminophen (TYLENOL) 325 MG tablet Take 325-650 mg by mouth every 6 hours as needed for mild pain (Patient not taking: Reported on 7/17/2024)      doxylamine (UNISOM) 25 MG TABS tablet Take 20 mg by mouth at bedtime      famotidine (PEPCID) 20 MG tablet Take 20 mg by mouth 2 times daily      levothyroxine (SYNTHROID/LEVOTHROID) 75 MCG tablet Take  1 tablet (75 mcg) by mouth daily for 90 days 90 tablet 0    metoclopramide (REGLAN) 5 MG tablet Take 2 tablets (10 mg) by mouth 4 times daily as needed (nasuea/vomiting in pregnancy) 60 tablet 1    ondansetron (ZOFRAN ODT) 4 MG ODT tab Take 1 tablet by mouth 3 times daily (Patient not taking: Reported on 7/17/2024)      Prenatal Vit-Fe Fumarate-FA (PRENATAL PLUS) 27-1 MG TABS Take 1 tablet by mouth daily 90 tablet 2       No Known Allergies    Tasneem Brock, ATC

## 2024-07-30 ENCOUNTER — TELEPHONE (OUTPATIENT)
Dept: ORTHOPEDICS | Facility: CLINIC | Age: 29
End: 2024-07-30
Payer: COMMERCIAL

## 2024-07-30 NOTE — PROGRESS NOTES
Hand Surgery Progress Note    REFERRING PHYSICIAN: Referred Self   PRIMARY CARE PHYSICIAN: Aurora Andres     Chief Complaint:   RECHECK (Follow-up right hand and wrist pain )    Interval 7/29/24:  Patient returns today with pain at new location of the wrist. Located at the dorsal central carpus over the capitate and 2nd/3rd CMC joints. She was previously seen for DeQuervain's tenosynovitis and received an injection 5/20/24 which relieved those symptoms. She states this pain started last week which prompted her to seek care with Dr. Lance. Later in the interview she states it started 3 weeks ago which worsened last week. She denies any trauma or injury. X-rays were obtained last week demonstrating possible new ossicles dorsal wrist. Dr. Lance reached out to me, his note documents significant tenderness and swelling. MRI was ordered and we fit her onto the schedule today for evaluation. She has been using the brace and tylenol, not able to take NSAIDs    History of Present Illness:     Cathy Mace is a 29 year old right-hand dominant female who presents for evaluation of right radial sided wrist pain.  Patient reports that this wrist pain started on 5/3/2024 after failed IV attempts on the radial aspect of her right wrist.  Patient reports that following this she had significant pain, swelling, bruising over the radial side of her wrist.  Pain did not improve.  She does subsequent ER visit on 5/9 and 5/13.  Workup was negative for infection or blood clot.  Orthopedics evaluated on 5/13 and suspected de Quervain's tenosynovitis.  She was given a thumb spica orthosis which she has been wearing full-time since the 13th.  She points to the radial side of the wrist as area of pain.  Swelling has significantly improved over the last week or so.  She has radial sided wrist pain with any wrist range of motion, worse with ulnar deviation.  She has been icing regularly and taking Tylenol as needed for pain.  Patient is  Quality 431: Preventive Care And Screening: Unhealthy Alcohol Use - Screening: Patient screened for unhealthy alcohol use using a single question and scores less than 2 times per year Detail Level: Detailed currently 13 weeks pregnant and is unable to take NSAIDs.       Review of Systems:     A 10 point ROS was performed and reviewed. Specific responses to these questions are noted at the end of the document.    Physical Exam:   Physical Exam Adult:  Musculoskeletal: A focused physical examination of the right wrist reveals:   Inspection  -skin is clean dry and intact. No swelling or tenderness over 1st dorsal compartment tendons at radial styloid. Mild edema over dorsal central wrist. No ecchymosis.  No erythema.  Palpation -tenderness palpation over the capitate and 3rd CMC joint. No previous tenderness over dorsal wrist capsule. Some discomfort with resisted MF extension. Full flexion/extension with wrist flexed, pain with wrist extension  Negative Finkelstein's.    Neurovascular- intact light touch sensation and motor to distribution of the median, ulnar and radial nerves. Brisk capillary refill to the distal fingers. 2+ radial pulse. Able to flex/extend all fingers. Able to make a fist  Range of Motion: Able to tolerate 50 deg wrist flexion, passive wrist extension is painful.               Imaging:   3 view X-ray of the right wrist from 7/23/24 compared to 5/13, independently interpreted by me. The results were discussed with the patient.  Findings include: new tiny ossicle over the dorsal 2nd/3rd CMC joints, prior large ossicle again visualized, unclear significance, concerning for trauma    MRI right wrist 7/25/24  IMPRESSION:  1.  No evidence for fracture.  2.  Thickening and intermediate signal within the extensor retinaculum along the dorsal aspect of the wrist could be reactive or secondary to sprain.  3.  Stretch or sprain type injury to the scapholunate ligament without tearing or diastasis of the joint interval.  4.  Nonspecific reactive edema along the radial aspect of the hand and dorsal aspect of the hand.  5.  Exam otherwise negative.    Assessment and Plan:   Assessment:  29 year old female here  Quality 226: Preventive Care And Screening: Tobacco Use: Screening And Cessation Intervention: Patient screened for tobacco use and is an ex/non-smoker with dorsal wrist pain     Plan:   I reviewed the radiographic and MRI findings in detail with the patient and her .  The swelling and sprain of the dorsal capsule over the capitate and CMC joint raises concern for trauma.  The patient denies this.    I advised that these findings are reassuring and that no major ligamentous or tendinous injury has occurred.  I do think that the symptoms will resolve with rest and time. She should continue using the brace during activities. I advised that while the patient may be a candidate for an injection if symptoms persist, I did not recommend it at this time given the acute onset (1 to 3 weeks) of symptoms. Steroids now may interfere with patient's own healing.    Patient was frustrated to hear this as symptoms have been quite limiting to ADLs. She was expecting an injection and ultimately she still asked to have an injection today. I was willing to do this at their request but recommended doing so under fluoroscopic guidance to make sure injection was targeted to the correct area.     Mini c-arm was in use during the visit. The patient waited for some time but elected to leave as the wait at that point was about 20 minutes.     I will contact my sports medicine colleagues to see if they would be willing to do this under ultrasound guidance. Referral placed    All questions answered.    Jimbo Danielle MD    Hand & Upper Extremity Surgery  Department of Orthopedic Surgery  [facility]       Quality 130: Documentation Of Current Medications In The Medical Record: Current Medications Documented

## 2024-07-30 NOTE — TELEPHONE ENCOUNTER
Patient called and said she wants to wait to get the injection. She wants to see if her pain gets worse before getting another one. Writer told patient that if it does get worse contact Dr. Danielle or clinic to schedule another appt. Patient was okay with calling to schedule another appt if needed.

## 2024-07-30 NOTE — TELEPHONE ENCOUNTER
Left Voicemail (1st Attempt) and Sent Mychart (1st Attempt) for the patient to call back and schedule the following:    Appointment type: RETURN [facility]K(ultrasound guided injection)  Provider: Alexis Mcknight Jewison, [place], David Smith, Linda Danielle Day  Return date: Next Available

## 2024-08-12 NOTE — PATIENT INSTRUCTIONS
"Weeks 22 to 26 of Your Pregnancy: Care Instructions  Your baby's lungs are getting ready for breathing. Your baby may respond to your voice. Your baby likely turns less, and kicks or jerks more. Jerking may mean that your baby has hiccups.    Think about taking childbirth classes. And start to think about whether you want to have pain medicine during labor.    At your next doctor visit, you may be tested for anemia and for high blood sugar that first occurs during pregnancy (gestational diabetes). These conditions can cause problems for you and your baby.    To ease discomfort, such as back pain    Change your position often. Try not to sit or stand for too long.  Get some exercise. Things like walking or stretching may help.  Try using a heating pad or cold pack.    To ease or reduce swelling in your feet, ankles, hands, and fingers    Take off your rings.  Avoid high-sodium foods, such as potato chips.  Prop up your feet, and sleep with pillows under your feet.  Try to avoid standing for long periods of time.  Do not wear tight shoes.  Wear support stockings.  Kegel exercises to prevent urine from leaking    Squeeze your muscles as if you were trying not to pass gas. Your belly, legs, and buttocks shouldn't move. Hold the squeeze for 3 seconds, then relax for 5 to 10 seconds.    Add 1 second each week until you can squeeze for 10 seconds. Repeat the exercise 10 times a session. Do 3 to 8 sessions a day. If these exercises cause you pain, stop doing them and talk with your doctor.  Follow-up care is a key part of your treatment and safety. Be sure to make and go to all appointments, and call your doctor if you are having problems. It's also a good idea to know your test results and keep a list of the medicines you take.  Where can you learn more?  Go to https://www.healthwise.net/patiented  Enter G264 in the search box to learn more about \"Weeks 22 to 26 of Your Pregnancy: Care Instructions.\"  Current as of: July " Dr. Jennifer Sevilla is having labs sent to  for review. White Blood cell count was 4.0. [facility] will be faxing results. Mother called . 10, 2023               Content Version: 14.0    7454-2179 [employer].   Care instructions adapted under license by your healthcare professional. If you have questions about a medical condition or this instruction, always ask your healthcare professional. [employer] disclaims any warranty or liability for your use of this information.      Learning About Screening for Gestational Diabetes  What is gestational diabetes screening?     Screening for gestational diabetes is a way to look for high blood sugar during pregnancy. You drink some very sweet liquid. Then you have a blood test to see how your body uses sugar (glucose).  How is gestational diabetes screening done?  Screening for gestational diabetes may be done in a couple of ways.  Two-part screening.  Part one (glucose challenge test): A blood sample is taken after you drink a liquid that contains sugar (glucose). You don't need to stop eating or drinking before this test. If the test shows that you don't have a lot of sugar in your blood, you don't have gestational diabetes.  Part two (oral glucose tolerance test, or OGTT): If the first test shows a lot of sugar in your blood, then you may have an OGTT. You can't eat or drink for at least 8 hours before this test. A blood sample is taken, then you drink a sweet liquid. You have more blood tests after 1 to 3 hours. If the OGTT shows that you have a lot of sugar in your blood, you may have gestational diabetes.  One-part screening.  Sometimes doctors use the OGTT on its own. If the test shows that you don't have a lot of sugar in your blood, you don't have gestational diabetes. If you do have a lot of sugar in your blood, you may have the condition.  What are the risks of screening?  Your blood glucose level may drop very low toward the end of the test. If this happens, you may feel weak, hungry, and restless. Tell your doctor if you have these symptoms. The test usually will be  "stopped.  You may vomit after drinking the sweet liquid. If this happens, you may need to take the test at a later time.  Your doctor may do more glucose tests at other times during your pregnancy.  Follow-up care is a key part of your treatment and safety. Be sure to make and go to all appointments, and call your doctor if you are having problems. It's also a good idea to know your test results and keep a list of the medicines you take.  Where can you learn more?  Go to https://www.[employer].net/patiented  Enter A472 in the search box to learn more about \"Learning About Screening for Gestational Diabetes.\"  Current as of: October 2, 2023               Content Version: 14.0    0767-8142 [employer].   Care instructions adapted under license by your healthcare professional. If you have questions about a medical condition or this instruction, always ask your healthcare professional. [employer] disclaims any warranty or liability for your use of this information.      You have been provided the My Labor and Birth Wishes document.  Please review at home and bring to your next prenatal visit. Bring this sheet to the hospital for your birth. Give copies to your care team members and support person.   Additional copies can be found here:  [email or web address]    Thank you for trusting us with your care!   Please be aware, if you are on Mychart, you may see your results prior to your providers review. If labs are abnormal, we will call or message you on [employer] with a follow up plan.    If you need to contact us for questions about:  Symptoms, Scheduling & Medical Questions; Non-urgent (2-3 day response) [employer] message, Urgent (needing response today) [phone] (if after 3:30pm next day response)   Prescriptions: Please call your Pharmacy   Billing: [employer] [phone] or  Physicians:[phone]    "

## 2024-08-13 ENCOUNTER — MYC REFILL (OUTPATIENT)
Dept: OBGYN | Facility: CLINIC | Age: 29
End: 2024-08-13
Payer: COMMERCIAL

## 2024-08-13 DIAGNOSIS — E03.8 OTHER SPECIFIED HYPOTHYROIDISM: ICD-10-CM

## 2024-08-14 ENCOUNTER — PRENATAL OFFICE VISIT (OUTPATIENT)
Dept: OBGYN | Facility: CLINIC | Age: 29
End: 2024-08-14
Attending: ADVANCED PRACTICE MIDWIFE
Payer: COMMERCIAL

## 2024-08-14 VITALS
BODY MASS INDEX: 29.23 KG/M2 | HEIGHT: 64 IN | WEIGHT: 171.2 LBS | HEART RATE: 90 BPM | DIASTOLIC BLOOD PRESSURE: 70 MMHG | SYSTOLIC BLOOD PRESSURE: 102 MMHG

## 2024-08-14 DIAGNOSIS — E03.8 OTHER SPECIFIED HYPOTHYROIDISM: ICD-10-CM

## 2024-08-14 DIAGNOSIS — Z34.81 ENCOUNTER FOR SUPERVISION OF OTHER NORMAL PREGNANCY IN FIRST TRIMESTER: ICD-10-CM

## 2024-08-14 DIAGNOSIS — O09.92 HRP (HIGH RISK PREGNANCY), SECOND TRIMESTER: Primary | ICD-10-CM

## 2024-08-14 LAB
ERYTHROCYTE [DISTWIDTH] IN BLOOD BY AUTOMATED COUNT: 14.2 % (ref 10–15)
GLUCOSE 1H P 50 G GLC PO SERPL-MCNC: 79 MG/DL (ref 70–129)
HCT VFR BLD AUTO: 35.8 % (ref 35–47)
HGB BLD-MCNC: 12.1 G/DL (ref 11.7–15.7)
MCH RBC QN AUTO: 29.7 PG (ref 26.5–33)
MCHC RBC AUTO-ENTMCNC: 33.8 G/DL (ref 31.5–36.5)
MCV RBC AUTO: 88 FL (ref 78–100)
PLATELET # BLD AUTO: 156 10E3/UL (ref 150–450)
RBC # BLD AUTO: 4.07 10E6/UL (ref 3.8–5.2)
TSH SERPL DL<=0.005 MIU/L-ACNC: 1.51 UIU/ML (ref 0.3–4.2)
VIT D+METAB SERPL-MCNC: 30 NG/ML (ref 20–50)
WBC # BLD AUTO: 7.5 10E3/UL (ref 4–11)

## 2024-08-14 PROCEDURE — 82306 VITAMIN D 25 HYDROXY: CPT | Performed by: ADVANCED PRACTICE MIDWIFE

## 2024-08-14 PROCEDURE — 99213 OFFICE O/P EST LOW 20 MIN: CPT | Performed by: ADVANCED PRACTICE MIDWIFE

## 2024-08-14 PROCEDURE — 86780 TREPONEMA PALLIDUM: CPT | Performed by: ADVANCED PRACTICE MIDWIFE

## 2024-08-14 PROCEDURE — 82950 GLUCOSE TEST: CPT | Performed by: ADVANCED PRACTICE MIDWIFE

## 2024-08-14 PROCEDURE — 85048 AUTOMATED LEUKOCYTE COUNT: CPT | Performed by: ADVANCED PRACTICE MIDWIFE

## 2024-08-14 PROCEDURE — 99207 PR PRENATAL VISIT: CPT | Performed by: ADVANCED PRACTICE MIDWIFE

## 2024-08-14 PROCEDURE — 36415 COLL VENOUS BLD VENIPUNCTURE: CPT | Performed by: ADVANCED PRACTICE MIDWIFE

## 2024-08-14 PROCEDURE — 84443 ASSAY THYROID STIM HORMONE: CPT | Performed by: ADVANCED PRACTICE MIDWIFE

## 2024-08-14 RX ORDER — LEVOTHYROXINE SODIUM 75 UG/1
75 TABLET ORAL DAILY
Qty: 90 TABLET | Refills: 0 | Status: SHIPPED | OUTPATIENT
Start: 2024-08-14

## 2024-08-14 NOTE — PROGRESS NOTES
Clinic Administered Medication Documentation        Patient was given Tdap. Prior to medication administration, verified patient's identity using patient s name and date of birth. Please see MAR and medication order for additional information. Patient instructed to remain in clinic for 15 minutes and report any adverse reaction to staff immediately.    Vial/Syringe: Syringe

## 2024-08-14 NOTE — PROGRESS NOTES
"29 year old  at 26w0d presents for a routine prenatal appointment.   No vaginal bleeding or leakage of fluid. No contractions or cramping.   Positive fetal movement.       No HA, visual changes, RUQ or epigastric pain.  Concerns: Back feels restless. Usually likes to sleep on her back so this is hard for her. Is coping. Otherwise doing well without questions. Has MFM US appt scheduled for Sept.       EOB folder given    Water birth consent form was not given- desires epidural.     Blood type: A POS    Antibody Screen   Date Value Ref Range Status   2024 Negative Negative Final   Rhogam  was not given.     Objective:  Vitals:    24 1455   BP: 102/70   Pulse: 90   Weight: 77.7 kg (171 lb 3.2 oz)   Height: 1.626 m (5' 4.02\")     See OB flowsheet  Blood type: A POS         2024     2:46 PM   PHQ   PHQ-9 Total Score 2   Q9: Thoughts of better off dead/self-harm past 2 weeks Not at all         A/P:  Patient Active Problem List    Diagnosis Date Noted    Single umbilical artery 2024     Priority: Medium     SUA, weekly BPP @ 36w      Right wrist pain 2024     Priority: Medium    Encounter for supervision of other normal pregnancy in first trimester 2024     Priority: Medium     [facility] Provider Group choice: CNM group- undecided  Partner's name: Franky  Employment: [employer]  [x]NOB folder  [x]Dating  [x] 1st trimester screening: MFM referral placed for 1st trimester screening place  [x]Fetal anatomy US ordered  [x] No added risk for PRE-E  [x] No increased risk for GDM  [x]No need for utox in labor  []COVID vaccine completed--no immunization info  []Pap- will do Postpartum    12-23wks________________________  []Offer AFP after 15 wks  [x]Rubella immune  [x]Hep B immune   [x]Varicella immune  []FLU shot    24-28wk_________________________  [x]EOB folder  [x]Labor plans: Epidural, birth plans scanned in. Desires quiet and peaceful room, no pics or video.  " for support  [x]Prenatal Ed- undecided  [x]: undecided  [x]Infant feeding plan- breast  [x][place] care provider choice- [facility]  [x]PP Contraception plan: undecided  [x]TDAP 24  []Rhogam N/A    29-35 wk________________________  []TOLAC consent done  [] Water birth interest  []GCT, passed  []RSV    36-37 wks______________________   [] GBS   []OTC PP meds sent  []PP recovery plans:  []Planning CS-ERAS pkt    38-42 wks______________________  []IOL reason/plans  []Postdates BPP      Hypothyroidism (acquired) 2024     Priority: Medium     Synthroid 75mg every day  24- TSH 3.89, T4 1.29.      Hx of postpartum depression, currently pregnant 2024     Priority: Medium     was on Lexapro, weaned from 20 mg to 5 mg and ended        Rectal fissure 2024     Priority: Medium     3/10/24 ED visit         Orders Placed This Encounter   Procedures    TDAP VACCINE (Adacel, Boostrix)  [[id number]]    Glucose tolerance gest screen 1 hour    Treponema Abs w Reflex to RPR and Titer    CBC with platelets    Vitamin D Deficiency    TSH with free T4 reflex       Updated individualized prenatal care plan on the problem list for 24-28weeks  Continue scheduled prenatal care. Refill sent for Synthroid.     Dominique Rawls CNM

## 2024-08-15 LAB — T PALLIDUM AB SER QL: NONREACTIVE

## 2024-08-19 ENCOUNTER — PRE VISIT (OUTPATIENT)
Dept: SURGERY | Facility: CLINIC | Age: 29
End: 2024-08-19

## 2024-08-19 ENCOUNTER — OFFICE VISIT (OUTPATIENT)
Dept: SURGERY | Facility: CLINIC | Age: 29
End: 2024-08-19
Attending: EMERGENCY MEDICINE
Payer: COMMERCIAL

## 2024-08-19 VITALS
WEIGHT: 170.7 LBS | BODY MASS INDEX: 29.14 KG/M2 | HEART RATE: 98 BPM | HEIGHT: 64 IN | SYSTOLIC BLOOD PRESSURE: 116 MMHG | DIASTOLIC BLOOD PRESSURE: 80 MMHG | OXYGEN SATURATION: 100 %

## 2024-08-19 DIAGNOSIS — K60.2 ANAL FISSURE: Primary | ICD-10-CM

## 2024-08-19 PROCEDURE — 99203 OFFICE O/P NEW LOW 30 MIN: CPT | Mod: 25 | Performed by: NURSE PRACTITIONER

## 2024-08-19 PROCEDURE — 46600 DIAGNOSTIC ANOSCOPY SPX: CPT | Performed by: NURSE PRACTITIONER

## 2024-08-19 ASSESSMENT — PAIN SCALES - GENERAL: PAINLEVEL: NO PAIN (0)

## 2024-08-19 NOTE — LETTER
"2024       RE: Cathy Mace  [address]  [address]  [facility] [address]     Dear Colleague,    Thank you for referring your patient, Cathy Mace, to the [facility] [place] at [facility]. Please see a copy of my visit note below.    Colon and Rectal Surgery Consult Clinic Note    Date: 2024     Referring provider:  Shea Bernstein MD  [address] [place] [address]     RE: Cathy Mace  : 1995  JOSE: 2024    Cathy Mace is a very pleasant 29 year old female who is currently 26 weeks pregnant here for anal fissure.    HPI:  Was in the ED with rectal bleeding and diagnosed with anal fissure. Was given Miralax for constipation. History of anal fissures. Colonoscopy  was normal.  She has been on Miralax and stools are now soft. She has not had any recent pain or bleeding.    Physical Examination:  /80 (BP Location: Left arm, Patient Position: Sitting, Cuff Size: Adult Regular)   Pulse 98   Ht 5' 4\"   Wt 170 lb 11.2 oz   LMP 2024 (Exact Date)   SpO2 100%   BMI 29.30 kg/m    General: alert, oriented, in no acute distress, sitting comfortably    Perianal external examination: Exam was chaperoned by IZABEL Calle   Perianal skin: Intact with no excoriation or lichenification.  Lesions: No evidence of an external lesion, nodularity, or induration in the perianal region.  Eversion of buttocks: There was not evidence of an anal fissure. Details: N/A.  Skin tags or external hemorrhoids: Yes: skin tag in the anterior midline.    Digital rectal examination: Was performed.   Sphincter tone: Good.  Palpable lesions: Yes - Location: soft, movable lesion in the anterior midline.  Other: None.  Bimanual examination: was not performed    Anoscopy: Was performed.   Hemorrhoids: No significant internal hemorrhoids.  Lesions: hypertrophied anal papilla in the anterior " midline    Assessment/Plan: 29 year old female with history of constipation and anal fissures. No active fissure on exam and she no longer is symptomatic with constipation management. Start a daily fiber supplement and continue on daily Miralax. Can follow up as needed if symptoms return. Patient's questions were answered to her stated satisfaction and she is in agreement with this plan.     Medical history:  Past Medical History:   Diagnosis Date     Hypothyroidism (acquired) 2024     Post partum depression 2024    was on Lexapro, weaned from 20 mg to 5 mg and ended      Rectal fissure 2024       Surgical history:  Past Surgical History:   Procedure Laterality Date     APPENDECTOMY         Problem list:    Patient Active Problem List    Diagnosis Date Noted     Single umbilical artery 2024     Priority: Medium     SUA, weekly BPP @ 36w       Right wrist pain 2024     Priority: Medium     Encounter for supervision of other normal pregnancy in first trimester 2024     Priority: Medium     [facility] Patient Provider Group choice: CNM group- undecided  Partner's name: Franky  Employment: [employer]  [x]NOB folder  [x]Dating  [x] 1st trimester screening: MFM referral placed for 1st trimester screening place  [x]Fetal anatomy US ordered  [x] No added risk for PRE-E  [x] No increased risk for GDM  [x]No need for utox in labor  []COVID vaccine completed--no immunization info  []Pap- will do Postpartum    12-23wks________________________  []Offer AFP after 15 wks  [x]Rubella immune  [x]Hep B immune   [x]Varicella immune  []FLU shot    24-28wk_________________________  [x]EOB folder  [x]Labor plans: Epidural, birth plans scanned in. Desires quiet and peaceful room, no pics or video.  for support  [x]Prenatal Ed- undecided  [x]: undecided  [x]Infant feeding plan- breast  [x][place] care provider choice- [facility]  [x]PP  Contraception plan: undecided  [x]TDAP 8/14/24  []Rhogam N/A    29-35 wk________________________  []TOLAC consent done  [] Water birth interest  []GCT, passed  []RSV    36-37 wks______________________   [] GBS   []OTC PP meds sent  []PP recovery plans:  []Planning CS-ERAS pkt    38-42 wks______________________  []IOL reason/plans  []Postdates BPP       Hypothyroidism (acquired) 03/20/2024     Priority: Medium     Synthroid 75mg every day  4/25/24- TSH 3.89, T4 1.29.       Hx of postpartum depression, currently pregnant 03/20/2024     Priority: Medium     was on Lexapro, weaned from 20 mg to 5 mg and ended 04/03        Rectal fissure 03/20/2024     Priority: Medium     3/10/24 ED visit         Medications:  Current Outpatient Medications   Medication Sig Dispense Refill     acetaminophen (TYLENOL) 325 MG tablet Take 325-650 mg by mouth every 6 hours as needed for mild pain       doxylamine (UNISOM) 25 MG TABS tablet Take 20 mg by mouth at bedtime       famotidine (PEPCID) 20 MG tablet Take 20 mg by mouth 2 times daily       levothyroxine (SYNTHROID/LEVOTHROID) 75 MCG tablet Take 1 tablet (75 mcg) by mouth daily 90 tablet 0     metoclopramide (REGLAN) 5 MG tablet Take 2 tablets (10 mg) by mouth 4 times daily as needed (nasuea/vomiting in pregnancy) 60 tablet 1     Prenatal Vit-Fe Fumarate-FA (PRENATAL PLUS) 27-1 MG TABS Take 1 tablet by mouth daily 90 tablet 2     ondansetron (ZOFRAN ODT) 4 MG ODT tab Take 1 tablet by mouth 3 times daily (Patient not taking: Reported on 7/17/2024)         Allergies:  No Known Allergies    Family history:  Family History   Problem Relation Age of Onset     No Known Problems Mother      No Known Problems Father      No Known Problems Sister      No Known Problems Brother      No Known Problems Brother      No Known Problems Maternal Grandmother         unknown     No Known Problems Maternal Grandfather         unknown     No Known Problems Paternal Grandmother         unknown     No  "Known Problems Paternal Grandfather         unknown       Social history:  Social History     Tobacco Use     Smoking status: Never     Smokeless tobacco: Never   Substance Use Topics     Alcohol use: Never    Marital status: .    Nursing Notes:   Lisseth Chong  8/19/2024  3:08 PM  Signed  Chief Complaint   Patient presents with     Consult     Anal fissure       Vitals:    08/19/24 1504   BP: 116/80   BP Location: Left arm   Patient Position: Sitting   Cuff Size: Adult Regular   Pulse: 98   SpO2: 100%   Weight: 170 lb 11.2 oz   Height: 5' 4\"       Body mass index is 29.3 kg/m .    Lisseth Chong, IZABEL     20 minutes spent on the date of encounter performing chart review, history and exam, documentation and further activities as noted above with an additional 2 minutes for anoscopy.     RADHA Elise, NP-C  Colon and Rectal Surgery   [facility]    This note was created using speech recognition software and may contain unintended word substitutions.      Again, thank you for allowing me to participate in the care of your patient.      Sincerely,    RADHA Elise CNP    "

## 2024-08-19 NOTE — PATIENT INSTRUCTIONS
Continue on Miralax daily. Can increase up to three times a day as needed for constipation.  Start a daily fiber supplement such as Citrucel or Metamucil POWDER. Start with once a day and slowly increase up to three times a day, if needed, over the next 4-6 weeks  Return to clinic if you have a return of pain or bleeding

## 2024-08-19 NOTE — NURSING NOTE
"Chief Complaint   Patient presents with    Consult     Anal fissure       Vitals:    08/19/24 1504   BP: 116/80   BP Location: Left arm   Patient Position: Sitting   Cuff Size: Adult Regular   Pulse: 98   SpO2: 100%   Weight: 170 lb 11.2 oz   Height: 5' 4\"       Body mass index is 29.3 kg/m .    Lisseth Chong, EMT  "

## 2024-08-19 NOTE — PROGRESS NOTES
"Colon and Rectal Surgery Consult Clinic Note    Date: 2024     Referring provider:  Shea Bernstein MD  [address] [place] [address]     RE: Cathy Mace  : 1995  JOSE: 2024    Cathy Mace is a very pleasant 29 year old female who is currently 26 weeks pregnant here for anal fissure.    HPI:  Was in the ED with rectal bleeding and diagnosed with anal fissure. Was given Miralax for constipation. History of anal fissures. Colonoscopy  was normal.  She has been on Miralax and stools are now soft. She has not had any recent pain or bleeding.    Physical Examination:  /80 (BP Location: Left arm, Patient Position: Sitting, Cuff Size: Adult Regular)   Pulse 98   Ht 5' 4\"   Wt 170 lb 11.2 oz   LMP 2024 (Exact Date)   SpO2 100%   BMI 29.30 kg/m    General: alert, oriented, in no acute distress, sitting comfortably    Perianal external examination: Exam was chaperoned by IZABEL Calle   Perianal skin: Intact with no excoriation or lichenification.  Lesions: No evidence of an external lesion, nodularity, or induration in the perianal region.  Eversion of buttocks: There was not evidence of an anal fissure. Details: N/A.  Skin tags or external hemorrhoids: Yes: skin tag in the anterior midline.    Digital rectal examination: Was performed.   Sphincter tone: Good.  Palpable lesions: Yes - Location: soft, movable lesion in the anterior midline.  Other: None.  Bimanual examination: was not performed    Anoscopy: Was performed.   Hemorrhoids: No significant internal hemorrhoids.  Lesions: hypertrophied anal papilla in the anterior midline    Assessment/Plan: 29 year old female with history of constipation and anal fissures. No active fissure on exam and she no longer is symptomatic with constipation management. Start a daily fiber supplement and continue on daily Miralax. Can follow up as needed if symptoms return. Patient's questions were answered to her stated satisfaction " and she is in agreement with this plan.     Medical history:  Past Medical History:   Diagnosis Date    Hypothyroidism (acquired) 2024    Post partum depression 2024    was on Lexapro, weaned from 20 mg to 5 mg and ended     Rectal fissure 2024       Surgical history:  Past Surgical History:   Procedure Laterality Date    APPENDECTOMY         Problem list:    Patient Active Problem List    Diagnosis Date Noted    Single umbilical artery 2024     Priority: Medium     SUA, weekly BPP @ 36w      Right wrist pain 2024     Priority: Medium    Encounter for supervision of other normal pregnancy in first trimester 2024     Priority: Medium     [facility] Patient Provider Group choice: CNM group- undecided  Partner's name: Franky  Employment: [employer]  [x]NOB folder  [x]Dating  [x] 1st trimester screening: MFM referral placed for 1st trimester screening place  [x]Fetal anatomy US ordered  [x] No added risk for PRE-E  [x] No increased risk for GDM  [x]No need for utox in labor  []COVID vaccine completed--no immunization info  []Pap- will do Postpartum    12-23wks________________________  []Offer AFP after 15 wks  [x]Rubella immune  [x]Hep B immune   [x]Varicella immune  []FLU shot    24-28wk_________________________  [x]EOB folder  [x]Labor plans: Epidural, birth plans scanned in. Desires quiet and peaceful room, no pics or video.  for support  [x]Prenatal Ed- undecided  [x]: undecided  [x]Infant feeding plan- breast  [x][place] [facility]  [x]PP Contraception plan: undecided  [x]TDAP 24  []Rhogam N/A    29-35 wk________________________  []TOLAC consent done  [] Water birth interest  []GCT, passed  []RSV    36-37 wks______________________   [] GBS   []OTC PP meds sent  []PP recovery plans:  []Planning [facility] pkt    38-42 wks______________________  []IOL reason/plans  []Postdates BPP      Hypothyroidism  (acquired) 03/20/2024     Priority: Medium     Synthroid 75mg every day  4/25/24- TSH 3.89, T4 1.29.      Hx of postpartum depression, currently pregnant 03/20/2024     Priority: Medium     was on Lexapro, weaned from 20 mg to 5 mg and ended 04/03       Rectal fissure 03/20/2024     Priority: Medium     3/10/24 ED visit         Medications:  Current Outpatient Medications   Medication Sig Dispense Refill    acetaminophen (TYLENOL) 325 MG tablet Take 325-650 mg by mouth every 6 hours as needed for mild pain      doxylamine (UNISOM) 25 MG TABS tablet Take 20 mg by mouth at bedtime      famotidine (PEPCID) 20 MG tablet Take 20 mg by mouth 2 times daily      levothyroxine (SYNTHROID/LEVOTHROID) 75 MCG tablet Take 1 tablet (75 mcg) by mouth daily 90 tablet 0    metoclopramide (REGLAN) 5 MG tablet Take 2 tablets (10 mg) by mouth 4 times daily as needed (nasuea/vomiting in pregnancy) 60 tablet 1    Prenatal Vit-Fe Fumarate-FA (PRENATAL PLUS) 27-1 MG TABS Take 1 tablet by mouth daily 90 tablet 2    ondansetron (ZOFRAN ODT) 4 MG ODT tab Take 1 tablet by mouth 3 times daily (Patient not taking: Reported on 7/17/2024)         Allergies:  No Known Allergies    Family history:  Family History   Problem Relation Age of Onset    No Known Problems Mother     No Known Problems Father     No Known Problems Sister     No Known Problems Brother     No Known Problems Brother     No Known Problems Maternal Grandmother         unknown    No Known Problems Maternal Grandfather         unknown    No Known Problems Paternal Grandmother         unknown    No Known Problems Paternal Grandfather         unknown       Social history:  Social History     Tobacco Use    Smoking status: Never    Smokeless tobacco: Never   Substance Use Topics    Alcohol use: Never    Marital status: .    Nursing Notes:   Lisseth Chong  8/19/2024  3:08 PM  Signed  Chief Complaint   Patient presents with    Consult     Anal fissure       Vitals:    08/19/24 1504  "  BP: 116/80   BP Location: Left arm   Patient Position: Sitting   Cuff Size: Adult Regular   Pulse: 98   SpO2: 100%   Weight: 170 lb 11.2 oz   Height: 5' 4\"       Body mass index is 29.3 kg/m .    IZABEL Calle     20 minutes spent on the date of encounter performing chart review, history and exam, documentation and further activities as noted above with an additional 2 minutes for anoscopy.     RADHA Jackson, NP-C  Colon and Rectal Surgery   [facility]    This note was created using speech recognition software and may contain unintended word substitutions.    "

## 2024-08-28 ENCOUNTER — PRENATAL OFFICE VISIT (OUTPATIENT)
Dept: OBGYN | Facility: CLINIC | Age: 29
End: 2024-08-28
Attending: ADVANCED PRACTICE MIDWIFE
Payer: COMMERCIAL

## 2024-08-28 VITALS
HEART RATE: 86 BPM | HEIGHT: 64 IN | DIASTOLIC BLOOD PRESSURE: 69 MMHG | WEIGHT: 172.6 LBS | BODY MASS INDEX: 29.47 KG/M2 | SYSTOLIC BLOOD PRESSURE: 101 MMHG

## 2024-08-28 DIAGNOSIS — O21.9 NAUSEA AND VOMITING IN PREGNANCY: ICD-10-CM

## 2024-08-28 DIAGNOSIS — Z34.81 ENCOUNTER FOR SUPERVISION OF OTHER NORMAL PREGNANCY IN FIRST TRIMESTER: Primary | ICD-10-CM

## 2024-08-28 DIAGNOSIS — Z86.59 HX OF POSTPARTUM DEPRESSION, CURRENTLY PREGNANT: ICD-10-CM

## 2024-08-28 DIAGNOSIS — Q27.0 SINGLE UMBILICAL ARTERY: ICD-10-CM

## 2024-08-28 DIAGNOSIS — Z87.59 HISTORY OF POSTPARTUM DEPRESSION: ICD-10-CM

## 2024-08-28 DIAGNOSIS — O99.891 HX OF POSTPARTUM DEPRESSION, CURRENTLY PREGNANT: ICD-10-CM

## 2024-08-28 DIAGNOSIS — Z86.59 HISTORY OF POSTPARTUM DEPRESSION: ICD-10-CM

## 2024-08-28 PROCEDURE — 99207 PR PRENATAL VISIT: CPT | Performed by: ADVANCED PRACTICE MIDWIFE

## 2024-08-28 PROCEDURE — 99213 OFFICE O/P EST LOW 20 MIN: CPT | Performed by: ADVANCED PRACTICE MIDWIFE

## 2024-08-28 RX ORDER — MULTIVIT-MIN/IRON/FOLIC ACID/K 18-600-40
1 CAPSULE ORAL DAILY
Qty: 90 CAPSULE | Refills: 3 | Status: SHIPPED | OUTPATIENT
Start: 2024-08-28

## 2024-08-28 RX ORDER — METOCLOPRAMIDE 5 MG/1
10 TABLET ORAL 4 TIMES DAILY PRN
Qty: 90 TABLET | Refills: 1 | Status: SHIPPED | OUTPATIENT
Start: 2024-08-28

## 2024-08-28 NOTE — PROGRESS NOTES
"29 year old  at 28w0d presents for a routine prenatal appointment.     TSH 1.51, Vitamin D: 30, Hemoglobin 12.1, GCT 79, Treponema negative  Received Tdap 24    Single Umbilical Artery: weekly BPP, growth at 36 weeks  Last 24: Hx of PPROM with  Delivery, 23/0 weeks, posterior placenta, no previa, normal fluid, cervix 3.2 cm,     Labor plans: Epidural, birth plans scanned in. Desires quiet and peaceful room, no pics or video.  may be able to be there for support, but they don't have anyone to take care of their son Jessica. She has asked her in-laws to come and offer assistance    PP recovery Boogie will get three weeks, then will work from home.  Cathy had postpartum depression/anxiety took escitalopram, stayed on escitalopram for two years. She is less concerned this will be an issue with this pregnancy, feeling much more stable and happy    Had PPROM with Jessica, denies leakage of fluid or cramping today    No vaginal bleeding or leakage of fluid. No contractions or cramping.   Positive fetal movement.       No HA, visual changes, RUQ or epigastric pain.  Concerns:     EOB folder given    Education completed today includes [facility] hand out, signs of pre-term labor, and hospital tour.  Birth preferences reviewed: Medicated  Labor support:  maybe  of MIL    Feeding plans :    Contraception planned:  unsure  Water birth consent form was not given.  Blood type: A POS    Antibody Screen   Date Value Ref Range Status   2024 Negative Negative Final   Rhogam  was not given.     Objective:  Vitals:    24 1537   BP: 101/69   Pulse: 86   Weight: 78.3 kg (172 lb 9.6 oz)   Height: 1.626 m (5' 4\")     See OB flowsheet  Blood type: A POS   Baby is Breech today        2024     2:46 PM   PHQ   PHQ-9 Total Score 2   Q9: Thoughts of better off dead/self-harm past 2 weeks Not at all         A/P:  Patient Active Problem List    Diagnosis Date Noted    " Single umbilical artery 2024     Priority: Medium     SUA, weekly BPP @ 36w      Right wrist pain 2024     Priority: Medium    Encounter for supervision of other normal pregnancy in first trimester 2024     Priority: Medium     [facility] Patient Provider Group choice: CNM group- undecided  Partner's name: Franky  Employment: [employer]  [x]NOB folder  [x]Dating  [x] 1st trimester screening: MFM referral placed for 1st trimester screening place  [x]Fetal anatomy US ordered  [x] No added risk for PRE-E  [x] No increased risk for GDM  [x]No need for utox in labor  []COVID vaccine completed--no immunization info  []Pap- will do Postpartum    12-23wks________________________  []Offer AFP after 15 wks  [x]Rubella immune  [x]Hep B immune   [x]Varicella immune  []FLU shot    24-28wk_________________________  [x]EOB folder  [x]Labor plans: Epidural, birth plans scanned in. Desires quiet and peaceful room, no pics or video.  for support  [x]Prenatal Ed- undecided  [x]: undecided  [x]Infant feeding plan- breast  [x] care provider choice- [facility]  [x]PP Contraception plan: undecided  [x]TDAP 24  []Rhogam N/A    29-35 wk________________________  []TOLAC consent done  [] Water birth interest  []GCT, passed  []RSV    36-37 wks______________________   [] GBS   []OTC PP meds sent  []PP recovery plans:  []Planning CS-ERAS pkt    38-42 wks______________________  []IOL reason/plans  []Postdates BPP      Hypothyroidism (acquired) 2024     Priority: Medium     Synthroid 75mg every day  24- TSH 3.89, T4 1.29.      Hx of postpartum depression, currently pregnant 2024     Priority: Medium     was on Lexapro, weaned from 20 mg to 5 mg and ended        Rectal fissure 2024     Priority: Medium     3/10/24 ED visit         No orders of the defined types were placed in this encounter.      Updated individualized prenatal care plan on the  problem list for 24-28weeks  Continue scheduled prenatal care  Sandra Shahid CNM

## 2024-08-28 NOTE — PATIENT INSTRUCTIONS
Thank you for trusting us with your care!   Please be aware, if you are on Mychart, you may see your results prior to your providers review. If labs are abnormal, we will call or message you on Mychart with a follow up plan.    If you need to contact us for questions about:  Symptoms, Scheduling & Medical Questions; Non-urgent (2-3 day response) Mychart message, Urgent (needing response today) [phone] (if after 3:30pm next day response)   Prescriptions: Please call your Pharmacy   Billing: Chris [phone] or [facility]:[phone]       Weeks 26 to 30 of Your Pregnancy: Care Instructions  You're starting your last trimester. You'll probably feel your baby moving around more. Your back may ache as your body gets used to your baby's size and length. Take care of yourself, and pay attention to what your body needs.    Talk to your doctor about getting the Tdap shot. It will help protect your  against whooping cough (pertussis). Also ask your doctor about flu and COVID-19 shots if you haven't had them yet. If your blood type is Rh negative, you may be given a shot of Rh immune globulin (such as RhoGAM). It can help prevent problems for your baby.   You may have Jose Juan-Nick contractions. They are single or several strong contractions without a pattern. These are practice contractions but not the start of labor.   Be kind to yourself.       Take breaks when you're tired.  Change positions often. Don't sit for too long or stand for too long.  At work, rest during breaks if you can. If you don't get breaks, talk to your doctor about writing a letter to your employer to request them.  Avoid fumes, chemicals, and tobacco smoke.  Be sexual if you want to.       You may be interested in sex, or you may not. Everyone is different.  Sex is okay unless your doctor tells you not to.  Your belly can make it hard to find good positions for sex. [place] and explore.  Watch for signs of  labor.       "  These signs include:  Menstrual-like cramps. Or you may have pain or pressure in your pelvis that happens in a pattern.  About 6 or more contractions in an hour (even after rest and a glass of water).  A low, dull backache that doesn't go away when you change positions.  An increase or change in vaginal discharge.  Light vaginal bleeding or spotting.  Your water breaking.  Know what to do if you think you are having contractions.       Drink 1 or 2 glasses of water.  Lie down on your left side for at least an hour.  While on your side, feel the top of your belly to see if it's tight.  Write down your contractions for an hour. Time how long it is from the start of one contraction to the start of the next.  Call your doctor if you have regular contractions.  Follow-up care is a key part of your treatment and safety. Be sure to make and go to all appointments, and call your doctor if you are having problems. It's also a good idea to know your test results and keep a list of the medicines you take.  Where can you learn more?  Go to https://www.[employer].net/patiented  Enter S999 in the search box to learn more about \"Weeks 26 to 30 of Your Pregnancy: Care Instructions.\"  Current as of: July 10, 2023  Content Version: 14.1 2006-2024 [employer].   Care instructions adapted under license by your healthcare professional. If you have questions about a medical condition or this instruction, always ask your healthcare professional. [employer] disclaims any warranty or liability for your use of this information.    Counting Your Baby's Kicks: Care Instructions  Overview     Counting your baby's kicks is one way your doctor can tell that your baby is healthy. You will probably feel your baby move for the first time between 16 and 22 weeks. The movement may feel like flutters rather than kicks. Your baby may move more at certain times of the day. When you are active, you may notice less kicking " "than when you are resting. At your prenatal visits, your doctor will ask whether the baby is active.  In your last trimester, your doctor may ask you to count the number of times you feel your baby move.  Follow-up care is a key part of your treatment and safety. Be sure to make and go to all appointments, and call your doctor if you are having problems. It's also a good idea to know your test results and keep a list of the medicines you take.  How do you count fetal kicks?  A common method of checking your baby's movement is to note the length of time it takes to count 10 movements (such as kicks, flutters, or rolls).  Pick your baby's most active time of day to count. This may be any time from morning to evening.  If you don't feel 10 movements in an hour, have something to eat or drink and count for another hour. If you don't feel at least 10 movements in the 2-hour period, call your doctor.  Do not use an at-home Doppler heart monitor in place of counting fetal movements.  When should you call for help?   Call your doctor now or seek immediate medical care if:    You feel fewer than 10 movements in a 2-hour period.     You noticed that your baby has stopped moving or is moving less than normal.   Watch closely for changes in your health, and be sure to contact your doctor if you have any problems.  Where can you learn more?  Go to https://www.[employer].net/patiented  Enter U048 in the search box to learn more about \"Counting Your Baby's Kicks: Care Instructions.\"  Current as of: July 10, 2023               Content Version: 14.0    4292-9746 [employer].   Care instructions adapted under license by your healthcare professional. If you have questions about a medical condition or this instruction, always ask your healthcare professional. [employer] disclaims any warranty or liability for your use of this information.        "

## 2024-09-05 ENCOUNTER — HOSPITAL ENCOUNTER (OUTPATIENT)
Facility: CLINIC | Age: 29
Discharge: HOME | End: 2024-09-06
Attending: REGISTERED NURSE | Admitting: REGISTERED NURSE
Payer: COMMERCIAL

## 2024-09-05 VITALS
HEART RATE: 68 BPM | RESPIRATION RATE: 16 BRPM | SYSTOLIC BLOOD PRESSURE: 118 MMHG | BODY MASS INDEX: 29.37 KG/M2 | DIASTOLIC BLOOD PRESSURE: 70 MMHG | HEIGHT: 64 IN | TEMPERATURE: 97.8 F | WEIGHT: 172 LBS

## 2024-09-05 PROBLEM — Z36.89 ENCOUNTER FOR TRIAGE IN PREGNANT PATIENT: Status: ACTIVE | Noted: 2024-09-05

## 2024-09-05 PROCEDURE — 999N000104 HC STATISTIC NO CHARGE

## 2024-09-05 PROCEDURE — G0463 HOSPITAL OUTPT CLINIC VISIT: HCPCS

## 2024-09-05 RX ORDER — PROCHLORPERAZINE MALEATE 10 MG
10 TABLET ORAL EVERY 6 HOURS PRN
Status: DISCONTINUED | OUTPATIENT
Start: 2024-09-05 | End: 2024-09-06 | Stop reason: HOSPADM

## 2024-09-05 RX ORDER — CYCLOBENZAPRINE HCL 5 MG
5 TABLET ORAL 3 TIMES DAILY
Status: DISCONTINUED | OUTPATIENT
Start: 2024-09-06 | End: 2024-09-06 | Stop reason: HOSPADM

## 2024-09-05 RX ORDER — PROCHLORPERAZINE 25 MG
25 SUPPOSITORY, RECTAL RECTAL EVERY 12 HOURS PRN
Status: DISCONTINUED | OUTPATIENT
Start: 2024-09-05 | End: 2024-09-06 | Stop reason: HOSPADM

## 2024-09-05 RX ORDER — ONDANSETRON 4 MG/1
4 TABLET, ORALLY DISINTEGRATING ORAL EVERY 6 HOURS PRN
Status: DISCONTINUED | OUTPATIENT
Start: 2024-09-05 | End: 2024-09-06 | Stop reason: HOSPADM

## 2024-09-05 RX ORDER — METOCLOPRAMIDE 10 MG/1
10 TABLET ORAL EVERY 6 HOURS PRN
Status: DISCONTINUED | OUTPATIENT
Start: 2024-09-05 | End: 2024-09-06 | Stop reason: HOSPADM

## 2024-09-05 RX ORDER — ONDANSETRON 2 MG/ML
4 INJECTION INTRAMUSCULAR; INTRAVENOUS EVERY 6 HOURS PRN
Status: DISCONTINUED | OUTPATIENT
Start: 2024-09-05 | End: 2024-09-06 | Stop reason: HOSPADM

## 2024-09-05 RX ORDER — SODIUM CHLORIDE, SODIUM LACTATE, POTASSIUM CHLORIDE, CALCIUM CHLORIDE 600; 310; 30; 20 MG/100ML; MG/100ML; MG/100ML; MG/100ML
INJECTION, SOLUTION INTRAVENOUS CONTINUOUS
Status: DISCONTINUED | OUTPATIENT
Start: 2024-09-06 | End: 2024-09-06 | Stop reason: HOSPADM

## 2024-09-05 RX ORDER — METOCLOPRAMIDE HYDROCHLORIDE 5 MG/ML
10 INJECTION INTRAMUSCULAR; INTRAVENOUS EVERY 6 HOURS PRN
Status: DISCONTINUED | OUTPATIENT
Start: 2024-09-05 | End: 2024-09-06 | Stop reason: HOSPADM

## 2024-09-05 RX ORDER — LIDOCAINE 40 MG/G
CREAM TOPICAL
Status: DISCONTINUED | OUTPATIENT
Start: 2024-09-05 | End: 2024-09-06 | Stop reason: HOSPADM

## 2024-09-05 RX ORDER — ACETAMINOPHEN 325 MG/1
975 TABLET ORAL EVERY 6 HOURS PRN
Status: DISCONTINUED | OUTPATIENT
Start: 2024-09-05 | End: 2024-09-06 | Stop reason: HOSPADM

## 2024-09-05 ASSESSMENT — ACTIVITIES OF DAILY LIVING (ADL): ADLS_ACUITY_SCORE: 35

## 2024-09-06 ENCOUNTER — HOSPITAL ENCOUNTER (OUTPATIENT)
Facility: CLINIC | Age: 29
End: 2024-09-06
Admitting: REGISTERED NURSE
Payer: COMMERCIAL

## 2024-09-06 PROCEDURE — 250N000013 HC RX MED GY IP 250 OP 250 PS 637: Performed by: REGISTERED NURSE

## 2024-09-06 PROCEDURE — 258N000003 HC RX IP 258 OP 636: Performed by: REGISTERED NURSE

## 2024-09-06 PROCEDURE — 96374 THER/PROPH/DIAG INJ IV PUSH: CPT

## 2024-09-06 PROCEDURE — G0463 HOSPITAL OUTPT CLINIC VISIT: HCPCS

## 2024-09-06 PROCEDURE — 250N000011 HC RX IP 250 OP 636: Performed by: REGISTERED NURSE

## 2024-09-06 RX ADMIN — ONDANSETRON 4 MG: 2 INJECTION INTRAMUSCULAR; INTRAVENOUS at 00:20

## 2024-09-06 RX ADMIN — CYCLOBENZAPRINE HYDROCHLORIDE 5 MG: 5 TABLET, FILM COATED ORAL at 00:37

## 2024-09-06 RX ADMIN — SODIUM CHLORIDE, POTASSIUM CHLORIDE, SODIUM LACTATE AND CALCIUM CHLORIDE 500 ML: 600; 310; 30; 20 INJECTION, SOLUTION INTRAVENOUS at 00:18

## 2024-09-06 ASSESSMENT — ACTIVITIES OF DAILY LIVING (ADL)
ADLS_ACUITY_SCORE: 20

## 2024-09-06 NOTE — PROGRESS NOTES
HOSPITAL TRIAGE NOTE  ===================    CHIEF COMPLAINT  ========================  Cathy Mace is a 29 year old patient presenting today at 29w2d for evaluation of nausea and vomiting.    Patient's last menstrual period was 2024 (exact date).  Estimated Date of Delivery: 2024       HPI  ==================   Patient reports worsening nausea and vomiting today. She notes that she usually has nausea and vomiting that are well managed by prescribed Reglan, but today it has not worked. She has vomited several times and is unable to keep down food or fluids, even water. She also notes some left sided pain that she attributes likely to the vomiting.     Prenatal record and labs reviewed from [facility], through [employer] EMR.    CONTRACTIONS: none  ABDOMINAL PAIN: mild, muscle soreness on left side  FETAL MOVEMENT: active    VAGINAL BLEEDING: none  RUPTURE OF MEMBRANES: no  PELVIC PAIN: none    PREGNANCY COMPLICATIONS:   - SUA  - hypothyroidism, on Synthroid   OTHER:   - Hx of PPD     # Pain Assessment:      2024    11:25 PM   Current Pain Score   Patient currently in pain? yes   - Cathy is experiencing pain due to likely repeated vomiting. Pain management was discussed and the plan was created in a collaborative fashion.  Cathy's response to the current recommendations: engaged  - Recently took Excedrin at 1800, may take Tylenol 975mg. Also use ice, warm packs and ordered single dose of flexeril as she notes the pain radiates down her left leg.       REVIEW OF SYSTEMS  =====================  C: NEGATIVE for fever, chills  I: NEGATIVE for worrisome rashes, moles or lesions  E: NEGATIVE for vision changes or irritation  R: NEGATIVE for significant cough or SOB  CV: NEGATIVE for chest pain, palpitations or varicosities  GI: POSITIVE for nausea, poor appetite, heartburn and vomiting  : NEGATIVE for frequency, dysuria, or hematuria  M: NEGATIVE for significant arthralgias or  "myalgia  N: NEGATIVE for headache, weakness, dizziness or paresthesias  P: NEGATIVE for changes in mood or affect    PROBLEM LIST  ===============  Patient Active Problem List    Diagnosis Date Noted    Encounter for triage in pregnant patient 2024     Priority: Medium    History of postpartum depression 2024     Priority: Medium       Cathy had postpartum depression/anxiety took escitalopram, stayed on escitalopram for two years. She is less concerned this will be an issue with this pregnancy, feeling much more stable and happy      Single umbilical artery 2024     Priority: Medium     SUA, weekly BPP @ 36w      Right wrist pain 2024     Priority: Medium    Encounter for supervision of other normal pregnancy in first trimester 2024     Priority: Medium     [facility] Patient Provider Group choice: CNM group- undecided  Partner's name: Franky \"Roby\"  Employment: [employer]  [x]NOB folder  [x]Dating  [x] 1st trimester screening: MFM referral placed for 1st trimester screening place  [x]Fetal anatomy US ordered  [x] No added risk for PRE-E  [x] No increased risk for GDM  [x]No need for utox in labor  []COVID vaccine completed--no immunization info  []Pap- will do Postpartum    12-23wks________________________  []Offer AFP after 15 wks  [x]Rubella immune  [x]Hep B immune   [x]Varicella immune  []FLU shot    24-28wk_________________________  [x]EOB folder  [x]Labor plans: Labor plans: Epidural, birth plans scanned in. Desires quiet and peaceful room, no pics or video.  may be able to be there for support, but they don't have anyone to take care of their son Jessica. She has asked her in-laws to come and offer assistance  [x]Prenatal Ed- undecided  [x]: undecided  [x]Infant feeding plan- breast  [x][place] care provider choice- [facility]  [x]PP Contraception plan: undecided  [x]TDAP 24  []Rhogam N/A    29-35 " wk________________________  []TOLAC consent done  [] Water birth interest  []GCT, passed  []RSV    36-37 wks______________________   [] GBS   []OTC PP meds sent  [x]PP recovery plans: PP recovery Boogie will get three weeks, then will work from home.  Cathy had postpartum depression/anxiety took escitalopram, stayed on escitalopram for two years. She is less concerned this will be an issue with this pregnancy, feeling much more stable and happy  []Planning CS-ERAS pkt    38-42 wks______________________  []IOL reason/plans  []Postdates BPP      Hypothyroidism (acquired) 03/20/2024     Priority: Medium     Synthroid 75mg every day  4/25/24- TSH 3.89, T4 1.29.      Hx of postpartum depression, currently pregnant 03/20/2024     Priority: Medium     was on Lexapro, weaned from 20 mg to 5 mg and ended 04/03       Rectal fissure 03/20/2024     Priority: Medium     3/10/24 ED visit         HISTORIES  ==============  ALLERGIES:    No Known Allergies  PAST MEDICAL HISTORY  Past Medical History:   Diagnosis Date    Hypothyroidism (acquired) 03/20/2024    Post partum depression 03/20/2024    was on Lexapro, weaned from 20 mg to 5 mg and ended 04/03    Rectal fissure 03/20/2024     SOCIAL HISTORY  Social History     Socioeconomic History    Marital status:      Spouse name: Braydon    Number of children: 1    Years of education: Not on file    Highest education level: Not on file   Occupational History    Occupation: [employer]   Tobacco Use    Smoking status: Never    Smokeless tobacco: Never   Vaping Use    Vaping status: Not on file   Substance and Sexual Activity    Alcohol use: Never    Drug use: Never    Sexual activity: Yes     Partners: Male   Other Topics Concern    Not on file   Social History Narrative    Not on file     Social Determinants of Health     Financial Resource Strain: Low Risk  (9/5/2024)    Financial Resource Strain     Within the past 12 months, have you or your family members you live with been unable to  get utilities (heat, electricity) when it was really needed?: No   Food Insecurity: Low Risk  (2024)    Food Insecurity     Within the past 12 months, did you worry that your food would run out before you got money to buy more?: No     Within the past 12 months, did the food you bought just not last and you didn t have money to get more?: No   Transportation Needs: Low Risk  (2024)    Transportation Needs     Within the past 12 months, has lack of transportation kept you from medical appointments, getting your medicines, non-medical meetings or appointments, work, or from getting things that you need?: No   Physical Activity: Not on file   Stress: Not on file   Social Connections: Not on file   Interpersonal Safety: Low Risk  (2024)    Interpersonal Safety     Do you feel physically and emotionally safe where you currently live?: Yes     Within the past 12 months, have you been hit, slapped, kicked or otherwise physically hurt by someone?: No     Within the past 12 months, have you been humiliated or emotionally abused in other ways by your partner or ex-partner?: No   Housing Stability: High Risk (2024)    Housing Stability     Do you have housing? : No     Are you worried about losing your housing?: No     PARTNER: Franky  FAMILY HISTORY  Family History   Problem Relation Age of Onset    No Known Problems Mother     No Known Problems Father     No Known Problems Sister     No Known Problems Brother     No Known Problems Brother     No Known Problems Maternal Grandmother         unknown    No Known Problems Maternal Grandfather         unknown    No Known Problems Paternal Grandmother         unknown    No Known Problems Paternal Grandfather         unknown     OB HISTORY  OB History    Para Term  AB Living   2 1 0 1 0 1   SAB IAB Ectopic Multiple Live Births   0 0 0 0 1      # Outcome Date GA Lbr Raymond/2nd Weight Sex Type Anes PTL Lv   2 Current            1  21 36w4d   M  "Vag-Spont EPI  CARMEN      Name: Jessica     Prenatal Labs:   Lab Results   Component Value Date    AS Negative 04/25/2024    RUQIGG Positive 04/25/2024    HEPBANG Nonreactive 04/25/2024    HGB 12.1 08/14/2024    HIAGAB Nonreactive 04/25/2024    GLU1 79 08/14/2024     Rubella- immune     EXAM  ============  /70 (BP Location: Left arm, Patient Position: Supine)   Pulse 68   Temp 97.8  F (36.6  C) (Oral)   Resp 16   Ht 1.626 m (5' 4\")   Wt 78 kg (172 lb)   LMP 02/14/2024 (Exact Date)   BMI 29.52 kg/m    GENERAL APPEARANCE: healthy, alert and no distress  RESP: even, unlabored breathing  CV: well perfused   ABDOMEN:  soft, nontender, no epigastric pain  SKIN: no suspicious lesions or rashes  NEURO: Denies headache, blurred vision, other vision changes  PSYCH: mentation appears normal. and affect normal/bright  MS/ LEGS: No edema    CONTRACTIONS: none   FETAL HEART TONES: continuous EFM- baseline 125 with moderate variability and positive accelerations. No decelerations.  NST: REACTIVE    PELVIC EXAM: deferred    ROM: no  ROMPLUS: not done    LABS: none    DIAGNOSIS  ============  29w2d seen on the [facility] Triage for n/v, left sided back and abdominal pain.   NST: REACTIVE  Fetal Heart Tones:category one  Patient Active Problem List   Diagnosis    Hypothyroidism (acquired)    Hx of postpartum depression, currently pregnant    Rectal fissure    Encounter for supervision of other normal pregnancy in first trimester    Right wrist pain    Single umbilical artery    History of postpartum depression    Encounter for triage in pregnant patient       PLAN  ============  IV fluid bolus 1L  Administer medications- Tylenol, Flexeril, IV Zofran   If symptoms improve/resolve with fluid bolus and medications, will plan to discharge home.     RADHA [facility]    Addendum @ 3980  Patient feeling better after fluids and medications. Will discharge home. Follow up in clinic with routine prenatal care.     May" JAMILAH Arriola, RADHA CNM

## 2024-09-06 NOTE — DISCHARGE INSTRUCTIONS
Learning About When to Call Your Doctor During Pregnancy (After 20 Weeks)  Overview  It's common to have concerns about what might be a problem when you're pregnant. Most pregnancies don't have any serious problems. But it's still important to know when to call your doctor if you have certain symptoms or signs of labor.  These are general suggestions. Your doctor may give you some more information about when to call.  When to call your doctor (after 20 weeks)  Call 911  anytime you think you may need emergency care. For example, call if:  You have severe vaginal bleeding. This means you are soaking through a pad each hour for 2 or more hours.  You have sudden, severe pain in your belly.  You have chest pain, are short of breath, or cough up blood.  You passed out (lost consciousness).  You have a seizure.  You see or feel the umbilical cord.  You think you are about to deliver your baby and can't make it safely to the hospital or birthing center.  Call your doctor now or seek immediate medical care if:  You have vaginal bleeding.  You have belly pain.  You have a fever.  You are dizzy or lightheaded, or you feel like you may faint.  You have signs of a blood clot in your leg (called a deep vein thrombosis), such as:  Pain in the calf, back of the knee, thigh, or groin.  Swelling in your leg or groin.  A color change on the leg or groin. The skin may be reddish or purplish, depending on your usual skin color.  You have symptoms of preeclampsia, such as:  Sudden swelling of your face, hands, or feet.  New vision problems (such as dimness, blurring, or seeing spots).  A severe headache.  You have a sudden release of fluid from your vagina. (You think your water broke.)  You've been having regular contractions for an hour. This means that you've had at least 6 contractions within 1 hour, even after you change your position and drink fluids.  You notice that your baby has stopped moving or is moving less than  "normal.  You have signs of heart failure, such as:  New or increased shortness of breath.  New or worse swelling in your legs, ankles, or feet.  Sudden weight gain, such as more than 2 to 3 pounds in a day or 5 pounds in a week.  Feeling so tired or weak that you cannot do your usual activities.  You have symptoms of a urinary tract infection. These may include:  Pain or burning when you urinate.  A frequent need to urinate without being able to pass much urine.  Pain in the flank, which is just below the rib cage and above the waist on either side of the back.  Blood in your urine.  Watch closely for changes in your health, and be sure to contact your doctor if:  You have vaginal discharge that smells bad.  You feel sad, anxious, or hopeless for more than a few days.  You have skin changes, such as a rash, itching, or a yellow color to your skin.  You have other concerns about your pregnancy.  If you have labor signs at 37 weeks or more  If you have signs of labor at 37 weeks or more, your doctor may tell you to call when your labor becomes more active. Symptoms of active labor include:  Contractions that are regular.  Contractions that are less than 5 minutes apart.  Contractions that are hard to talk through.  Follow-up care is a key part of your treatment and safety. Be sure to make and go to all appointments, and call your doctor if you are having problems. It's also a good idea to know your test results and keep a list of the medicines you take.  Where can you learn more?  Go to https://www.[employer].net/patiented  Enter N531 in the search box to learn more about \"Learning About When to Call Your Doctor During Pregnancy (After 20 Weeks).\"  Current as of: July 10, 2023  Content Version: 14.1    1613-6420 [employer].   Care instructions adapted under license by your healthcare professional. If you have questions about a medical condition or this instruction, always ask your healthcare " professional. [employer] disclaims any warranty or liability for your use of this information.

## 2024-09-06 NOTE — PROGRESS NOTES
Data: Patient assessed in the [facility] for back pain and abdominal pain that radiates down L) leg. Membranes intact. Contractions are not present. See flowsheets for fetal assessment documentation.     Action: Presumed adequate fetal oxygenation documented. Discharge instructions reviewed. Patient instructed to report change in fetal movement, vaginal leaking of fluid or bleeding, abdominal pain, or any concerns related to the pregnancy to provider/clinic.  Peripheral IV placed, LR bolus given. Flexeril administered for musculoskeletal pain and Zofran for nausea (see MAR).    Response: Orders to discharge home per CARISSA Arriola. Pt reports adequate pain relief and that nausea has resolved. Patient verbalized understanding of education and agreement with plan. Discharged to home at 0200.

## 2024-09-06 NOTE — ED NOTES
Pt arrived to ED with complaint of abd pain .  Pt reports 29 weeks pregnant.   Pt is having contractions No.   Pt feels urge to push No.   Pt reports water broke No.   Report was called and pt was transferred to L&D Yes.

## 2024-09-11 ENCOUNTER — PRENATAL OFFICE VISIT (OUTPATIENT)
Dept: OBGYN | Facility: CLINIC | Age: 29
End: 2024-09-11
Attending: ADVANCED PRACTICE MIDWIFE
Payer: COMMERCIAL

## 2024-09-11 VITALS
WEIGHT: 174.1 LBS | SYSTOLIC BLOOD PRESSURE: 104 MMHG | BODY MASS INDEX: 29.88 KG/M2 | HEART RATE: 102 BPM | DIASTOLIC BLOOD PRESSURE: 72 MMHG

## 2024-09-11 DIAGNOSIS — M54.32 SCIATIC NERVE PAIN, LEFT: ICD-10-CM

## 2024-09-11 DIAGNOSIS — E03.9 HYPOTHYROIDISM (ACQUIRED): ICD-10-CM

## 2024-09-11 DIAGNOSIS — Z34.83 ENCOUNTER FOR SUPERVISION OF OTHER NORMAL PREGNANCY IN THIRD TRIMESTER: Primary | ICD-10-CM

## 2024-09-11 DIAGNOSIS — Q27.0 SINGLE UMBILICAL ARTERY: ICD-10-CM

## 2024-09-11 PROBLEM — Z36.89 ENCOUNTER FOR TRIAGE IN PREGNANT PATIENT: Status: RESOLVED | Noted: 2024-09-05 | Resolved: 2024-09-11

## 2024-09-11 PROCEDURE — 99213 OFFICE O/P EST LOW 20 MIN: CPT | Performed by: ADVANCED PRACTICE MIDWIFE

## 2024-09-11 PROCEDURE — 99207 PR PRENATAL VISIT: CPT | Performed by: ADVANCED PRACTICE MIDWIFE

## 2024-09-11 NOTE — PROGRESS NOTES
"Subjective:      29 year old  at 30w0d presents for a routine prenatal appointment.      Denies cramping/contractions, vaginal bleeding, discharge or leakage of fluid. Reports +fetal movement.  No HA, vision changes, ruq/epigastric pain.     Patient concerns: Feeling well overall. Was seen in triage on  for n/v and left side pain.  Received IV fluids.  Takes tylenol at home when needed. States wrist pain is still present but she has other aches and pains that are more noticeable as the pregnancy progresses.     Has growth ultrasound this month d/t SUA.    Objective:  Vitals:    24 1541   BP: 104/72   Pulse: 102   Weight: 79 kg (174 lb 1.6 oz)     See ob flowsheet    Assessment/Plan     Patient Active Problem List    Diagnosis Date Noted    History of postpartum depression 2024     Priority: Derick Morgan had postpartum depression/anxiety took escitalopram, stayed on escitalopram for two years. She is less concerned this will be an issue with this pregnancy, feeling much more stable and happy      Single umbilical artery-growth , weekly BPP @ 36w 2024     Priority: Medium     SUA, growth , weekly BPP @ 36w      Right wrist pain 2024     Priority: Medium    Encounter for supervision of other normal pregnancy in first trimester 2024     Priority: Medium     [facility] ([facility] Patient Provider Group choice: CNM group- undecided  Partner's name: Franky \"Roby\"  Employment: [employer]  [x]NO folder  [x]Dating  [x] 1st trimester screening: MFM referral placed for 1st trimester screening place  [x]Fetal anatomy US ordered  [x] No added risk for PRE-E  [x] No increased risk for GDM  [x]No need for utox in labor  []COVID vaccine completed--no immunization info  []Pap- will do Postpartum    12-23wks________________________  []Offer AFP after 15 wks  [x]Rubella immune  [x]Hep B immune   [x]Varicella immune  []FLU shot    24-28wk_________________________  [x]EOB " folder  [x]Labor plans: Labor plans: Epidural, birth plans scanned in. Desires quiet and peaceful room, no pics or video.  may be able to be there for support, but they don't have anyone to take care of their son Jessica. She has asked her in-laws to come and offer assistance  [x]Prenatal Ed- undecided  [x]: undecided  [x]Infant feeding plan- breast  [x][place] care provider choice- [facility]  [x]PP Contraception plan: undecided  [x]TDAP 24  []Rhogam N/A    29-35 wk________________________  []TOLAC consent done  [] Water birth interest  []GCT, passed  []RSV    36-37 wks______________________   [] GBS   []OTC PP meds sent  [x]PP recovery plans: PP recovery Boogie will get three weeks, then will work from home.  Cathy had postpartum depression/anxiety took escitalopram, stayed on escitalopram for two years. She is less concerned this will be an issue with this pregnancy, feeling much more stable and happy  []Planning CS-ERAS pkt    38-42 wks______________________  []IOL reason/plans  []Postdates BPP      Hypothyroidism (acquired)- : plan to check at next visit 2024     Priority: Medium     Synthroid 75mg every day  24- TSH 3.89, T4 1.29.    Check monthly, last on : plan to check at next visit       Hx of postpartum depression, currently pregnant 2024     Priority: Medium     was on Lexapro, weaned from 20 mg to 5 mg and ended        Rectal fissure 2024     Priority: Medium     3/10/24 ED visit         Orders Placed This Encounter   Procedures    OB/Maternity Back Brace for DME - ONLY FOR DME       Updated individualized prenatal care plan on the problem list for 3rd trimester    - Reviewed EOB labs.  - Plan thyroid labs at next visit.  - RSV 32-36 weeks.  - MFM ultrasound scheduled for 24.    Return to clinic in 2 weeks and prn if questions or concerns.     Pascual Mccray, APRN, CNM

## 2024-09-24 NOTE — PATIENT INSTRUCTIONS
Thank you for trusting us with your care!   Please be aware, if you are on Mychart, you may see your results prior to your providers review. If labs are abnormal, we will call or message you on Mychart with a follow up plan.    If you need to contact us for questions about:  Symptoms, Scheduling & Medical Questions; Non-urgent (2-3 day response) Mychart message, Urgent (needing response today) [phone] (if after 3:30pm next day response)   Prescriptions: Please call your Pharmacy   Billing: Chris [phone] or [facility]:[phone]     Weeks 32 to 34 of Your Pregnancy: Care Instructions    Decide whether you want to bank or donate your baby's umbilical cord blood. If you want to save this blood, you have to arrange for it ahead of time.   Decide about circumcision. Personal, [employer]aritan, or cultural beliefs may play a role in your decision. You get to decide what you want for your baby.         Learn how to ease hemorrhoids.   Get more liquids, fruits, vegetables, and fiber in your diet.  Avoid sitting for too long.  Clean yourself with moist toilet paper. Or try witch hazel pads.  Try ice packs or warm sitz baths for discomfort.  Use hydrocortisone cream for pain or itching.  Ask your doctor about stool softeners.        Consider the benefits of breastfeeding.   It reduces your baby's risk of sudden infant death syndrome (SIDS).   babies are less likely to get certain infections. And they're less likely to be obese or get diabetes later in life.  It can lower your risk of breast and ovarian cancers and osteoporosis.  It saves you money.  Follow-up care is a key part of your treatment and safety. Be sure to make and go to all appointments, and call your doctor if you are having problems. It's also a good idea to know your test results and keep a list of the medicines you take.  Where can you learn more?  Go to https://www.healthwise.net/patiented  Enter X711 in the search box to learn more about  "\"Weeks 32 to 34 of Your Pregnancy: Care Instructions.\"  Current as of: July 10, 2023  Content Version: 14.1 2006-2024 [employer].   Care instructions adapted under license by your healthcare professional. If you have questions about a medical condition or this instruction, always ask your healthcare professional. [employer] disclaims any warranty or liability for your use of this information.    You have been provided the Any Day Now: Early Labor at Home document.    Additional copies can be found here:  www.[email or web address]  You have been provided the What I'd Wish I'd Known About Giving Birth document.    Additional copies can be found here:  [email or web address]  "

## 2024-09-26 ENCOUNTER — OFFICE VISIT (OUTPATIENT)
Dept: MATERNAL FETAL MEDICINE | Facility: CLINIC | Age: 29
End: 2024-09-26
Attending: OBSTETRICS & GYNECOLOGY
Payer: COMMERCIAL

## 2024-09-26 ENCOUNTER — PRENATAL OFFICE VISIT (OUTPATIENT)
Dept: OBGYN | Facility: CLINIC | Age: 29
End: 2024-09-26
Attending: ADVANCED PRACTICE MIDWIFE
Payer: COMMERCIAL

## 2024-09-26 ENCOUNTER — HOSPITAL ENCOUNTER (OUTPATIENT)
Dept: ULTRASOUND IMAGING | Facility: CLINIC | Age: 29
Discharge: HOME | End: 2024-09-26
Attending: OBSTETRICS & GYNECOLOGY
Payer: COMMERCIAL

## 2024-09-26 VITALS
WEIGHT: 176.8 LBS | HEIGHT: 64 IN | DIASTOLIC BLOOD PRESSURE: 74 MMHG | SYSTOLIC BLOOD PRESSURE: 108 MMHG | HEART RATE: 67 BPM | BODY MASS INDEX: 30.19 KG/M2

## 2024-09-26 DIAGNOSIS — Z34.83 ENCOUNTER FOR SUPERVISION OF OTHER NORMAL PREGNANCY IN THIRD TRIMESTER: Primary | ICD-10-CM

## 2024-09-26 DIAGNOSIS — E03.9 HYPOTHYROIDISM (ACQUIRED): ICD-10-CM

## 2024-09-26 DIAGNOSIS — Q27.0 SINGLE UMBILICAL ARTERY: ICD-10-CM

## 2024-09-26 DIAGNOSIS — O09.892 HISTORY OF PRETERM DELIVERY, CURRENTLY PREGNANT IN SECOND TRIMESTER: ICD-10-CM

## 2024-09-26 DIAGNOSIS — O09.899 SINGLE UMBILICAL ARTERY AFFECTING MANAGEMENT OF MOTHER IN SINGLETON PREGNANCY, ANTEPARTUM: Primary | ICD-10-CM

## 2024-09-26 PROBLEM — Z87.59 HISTORY OF POSTPARTUM DEPRESSION: Status: RESOLVED | Noted: 2024-08-28 | Resolved: 2024-09-26

## 2024-09-26 PROBLEM — Z86.59 HISTORY OF POSTPARTUM DEPRESSION: Status: RESOLVED | Noted: 2024-08-28 | Resolved: 2024-09-26

## 2024-09-26 PROCEDURE — 76816 OB US FOLLOW-UP PER FETUS: CPT | Mod: 26 | Performed by: OBSTETRICS & GYNECOLOGY

## 2024-09-26 PROCEDURE — 99207 PR PRENATAL VISIT: CPT | Performed by: ADVANCED PRACTICE MIDWIFE

## 2024-09-26 PROCEDURE — 76816 OB US FOLLOW-UP PER FETUS: CPT

## 2024-09-26 PROCEDURE — 99213 OFFICE O/P EST LOW 20 MIN: CPT | Performed by: ADVANCED PRACTICE MIDWIFE

## 2024-09-26 NOTE — PROGRESS NOTES
"Subjective:      29 year old  at 32w1d presents for a routine prenatal appointment.      Denies cramping/contractions, vaginal bleeding, discharge or leakage of fluid. Reports +fetal movement.  No HA, vision changes, ruq/epigastric pain.     Patient concerns: Feeling well overall. MFM growth ultrasound today for single umbilical artery- EFW 83%tile, AC 87%tile, breech.   Needs to schedule weekly BPPs starting at 36 weeks.    Objective:  Vitals:    24 1510   BP: 108/74   Pulse: 67   Weight: 80.2 kg (176 lb 12.8 oz)   Height: 1.626 m (5' 4\")     See ob flowsheet    Assessment/Plan     Patient Active Problem List    Diagnosis Date Noted    Breech presentation, @ 32 weeks 10/01/2024     Priority: Medium    Single umbilical artery-growth - 83%tile,, weekly BPP @ 36w 2024     Priority: Medium     SUA, growth - EFW 83%tile, AC 87%tile, breech.   weekly BPP @ 36w      Right wrist pain 2024     Priority: Medium    Encounter for supervision of other normal pregnancy in first trimester 2024     Priority: Medium     [facility] ([facility] Patient Provider Group choice: CNM group- undecided  Partner's name: Franky \"Roby\"  Employment: [employer]  [x]NOB folder  [x]Dating  [x] 1st trimester screening: MFM referral placed for 1st trimester screening place  [x]Fetal anatomy US ordered  [x] No added risk for PRE-E  [x] No increased risk for GDM  [x]No need for utox in labor  []COVID vaccine completed--no immunization info  []Pap- will do Postpartum    12-23wks________________________  []Offer AFP after 15 wks  [x]Rubella immune  [x]Hep B immune   [x]Varicella immune  []FLU shot    24-28wk_________________________  [x]EOB folder  [x]Labor plans: Labor plans: Epidural, birth plans scanned in. Desires quiet and peaceful room, no pics or video.  may be able to be there for support, but they don't have anyone to take care of their son Jessica. She has asked her in-laws to come and offer " assistance  [x]Prenatal Ed- undecided  [x]: undecided  [x]Infant feeding plan- breast  [x] care provider choice- [facility] Yaakov Loera  [x]PP Contraception plan: undecided  [x]TDAP 24  []Rhogam N/A    29-35 wk________________________  []TOLAC consent done  [] Water birth interest  []GCT, passed  []RSV    36-37 wks______________________   [] GBS   []OTC PP meds sent  [x]PP recovery plans: PP recovery Boogie will get three weeks, then will work from home.  Cathy had postpartum depression/anxiety took escitalopram, stayed on escitalopram for two years. She is less concerned this will be an issue with this pregnancy, feeling much more stable and happy  []Planning CS-ERAS pkt    38-42 wks______________________  []IOL reason/plans  []Postdates BPP      Hypothyroidism (acquired)- : plan to check at next visit 2024     Priority: Medium     Synthroid 75mg every day  24- TSH 3.89, T4 1.29.    Check monthly, last on : plan to check at next visit   : prefers to check at next visit      Hx of postpartum depression, currently pregnant 2024     Priority: Medium     was on Lexapro, weaned from 20 mg to 5 mg and ended        Rectal fissure 2024     Priority: Medium     3/10/24 ED visit         Orders Placed This Encounter   Procedures    US OB Fetal Biophys Prf wo NonStrs Singls Sgl    US OB Fetal Biophys Prf wo NonStrs Singls Sgl    US OB Fetal Biophys Prf wo NonStrs Singls Sgl    US OB Fetal Biophys Prf wo NonStrs Singls Sgl    US OB Biophys Single Gestation Measure    RSV VACCINE (ABRYSVO)    TSH with free T4 reflex       Updated individualized prenatal care plan on the problem list for 3rd trimester    - Reviewed MFM ultrasound.  - Orders placed for weekly BPPs starting at 36 weeks. Pt will schedule.   - Recommended thyroid lab today, pt prefers next visit. Future order in place.  - RSV administered.     Return to clinic in 2 weeks and prn if  questions or concerns.     RADHA Alexis, CNM

## 2024-09-26 NOTE — NURSING NOTE
Patient reports positive fetal movement, denies contractions, leaking of fluid, or bleeding.  Education provided to patient on today's ultrasound.  SBAR given to ALEN FLORES, see their note in Epic.

## 2024-09-26 NOTE — PROGRESS NOTES
"Please see \"Imaging\" tab under \"Chart Review\" for details of today's US at the [facility].    Celio Stone MD  Maternal-Fetal Medicine    "

## 2024-10-06 ENCOUNTER — MYC MEDICAL ADVICE (OUTPATIENT)
Dept: OBGYN | Facility: CLINIC | Age: 29
End: 2024-10-06
Payer: COMMERCIAL

## 2024-10-07 ENCOUNTER — LAB (OUTPATIENT)
Dept: LAB | Facility: CLINIC | Age: 29
End: 2024-10-07
Payer: COMMERCIAL

## 2024-10-07 DIAGNOSIS — N89.8 VAGINAL DISCHARGE: Primary | ICD-10-CM

## 2024-10-07 DIAGNOSIS — Z34.83 ENCOUNTER FOR SUPERVISION OF OTHER NORMAL PREGNANCY IN THIRD TRIMESTER: ICD-10-CM

## 2024-10-07 DIAGNOSIS — Q27.0 SINGLE UMBILICAL ARTERY: ICD-10-CM

## 2024-10-07 DIAGNOSIS — N89.8 VAGINAL DISCHARGE: ICD-10-CM

## 2024-10-07 LAB
ALBUMIN UR-MCNC: NEGATIVE MG/DL
APPEARANCE UR: ABNORMAL
BACTERIA #/AREA URNS HPF: ABNORMAL /HPF
BILIRUB UR QL STRIP: NEGATIVE
COLOR UR AUTO: ABNORMAL
GLUCOSE UR STRIP-MCNC: NEGATIVE MG/DL
HGB UR QL STRIP: NEGATIVE
KETONES UR STRIP-MCNC: NEGATIVE MG/DL
LEUKOCYTE ESTERASE UR QL STRIP: ABNORMAL
MUCOUS THREADS #/AREA URNS LPF: PRESENT /LPF
NITRATE UR QL: NEGATIVE
PH UR STRIP: 7 [PH] (ref 5–7)
RBC URINE: 0 /HPF
SP GR UR STRIP: 1.01 (ref 1–1.03)
SQUAMOUS EPITHELIAL: 13 /HPF
TRANSITIONAL EPI: <1 /HPF
TSH SERPL DL<=0.005 MIU/L-ACNC: 2.21 UIU/ML (ref 0.3–4.2)
UROBILINOGEN UR STRIP-MCNC: NORMAL MG/DL
WBC URINE: 12 /HPF

## 2024-10-07 PROCEDURE — 84443 ASSAY THYROID STIM HORMONE: CPT

## 2024-10-07 PROCEDURE — 36415 COLL VENOUS BLD VENIPUNCTURE: CPT

## 2024-10-07 PROCEDURE — 87086 URINE CULTURE/COLONY COUNT: CPT

## 2024-10-07 PROCEDURE — 81001 URINALYSIS AUTO W/SCOPE: CPT

## 2024-10-09 ENCOUNTER — PRENATAL OFFICE VISIT (OUTPATIENT)
Dept: OBGYN | Facility: CLINIC | Age: 29
End: 2024-10-09
Attending: ADVANCED PRACTICE MIDWIFE
Payer: COMMERCIAL

## 2024-10-09 VITALS
HEIGHT: 64 IN | HEART RATE: 92 BPM | DIASTOLIC BLOOD PRESSURE: 73 MMHG | SYSTOLIC BLOOD PRESSURE: 105 MMHG | WEIGHT: 179 LBS | BODY MASS INDEX: 30.56 KG/M2

## 2024-10-09 DIAGNOSIS — Z34.81 ENCOUNTER FOR SUPERVISION OF OTHER NORMAL PREGNANCY IN FIRST TRIMESTER: Primary | ICD-10-CM

## 2024-10-09 DIAGNOSIS — E03.9 HYPOTHYROIDISM (ACQUIRED): ICD-10-CM

## 2024-10-09 DIAGNOSIS — R82.90 ABNORMAL URINE ODOR: ICD-10-CM

## 2024-10-09 DIAGNOSIS — Z86.59 HX OF POSTPARTUM DEPRESSION, CURRENTLY PREGNANT: ICD-10-CM

## 2024-10-09 DIAGNOSIS — O99.891 HX OF POSTPARTUM DEPRESSION, CURRENTLY PREGNANT: ICD-10-CM

## 2024-10-09 DIAGNOSIS — Q27.0 SINGLE UMBILICAL ARTERY: ICD-10-CM

## 2024-10-09 LAB
BACTERIA UR CULT: NORMAL
BACTERIAL VAGINOSIS VAG-IMP: NEGATIVE
CANDIDA DNA VAG QL NAA+PROBE: NOT DETECTED
CANDIDA GLABRATA / CANDIDA KRUSEI DNA: NOT DETECTED
T VAGINALIS DNA VAG QL NAA+PROBE: NOT DETECTED

## 2024-10-09 PROCEDURE — 99207 PR PRENATAL VISIT: CPT | Performed by: MIDWIFE

## 2024-10-09 PROCEDURE — 99213 OFFICE O/P EST LOW 20 MIN: CPT | Performed by: MIDWIFE

## 2024-10-09 PROCEDURE — 0352U MULTIPLEX VAGINAL PANEL BY PCR: CPT | Performed by: MIDWIFE

## 2024-10-09 PROCEDURE — 87491 CHLMYD TRACH DNA AMP PROBE: CPT | Performed by: MIDWIFE

## 2024-10-09 NOTE — PROGRESS NOTES
"Subjective:      29 year old  at 34w0d presents for a routine prenatal appointment.    Denies vaginal bleeding or leakage of fluid.  Not having contractions or cramping.   Reports active fetal movement.       Denies HA, visual changes, RUQ or epigastric pain.     Reviewed recent labs.     Patient concerns: No concerns   Breech at last visit  TSH 2.21  Pt sent MyChart regarding odor several days ago. UA and UC collected yesterday shows low level pyuria, mucus, few bacteria. Discussed likely contaminated. Urine culture negative. Wet prep and CT/GC future labs are in and pt completed self collect. States that her initial symptoms are now resolved.     Single umbilical artery - weekly BPP at 36 weeks.  US showed 83rd percentile, normal MVP, breech      Objective:  Vitals:    10/09/24 1536   BP: 105/73   Pulse: 92   Weight: 81.2 kg (179 lb)   Height: 1.626 m (5' 4\")      see ob flowsheet    Cephalic by Leopolds and BSUS    Assessment/Plan  Patient Active Problem List    Diagnosis Date Noted    Single umbilical artery-growth - 83%tile,, weekly BPP @ 36w 2024     Priority: Medium     SUA, growth - EFW 83%tile, AC 87%tile, breech.   weekly BPP @ 36w      Right wrist pain 2024     Priority: Medium    Encounter for supervision of other normal pregnancy in first trimester 2024     Priority: Medium     [facility] ([facility] Patient Provider Group choice: CNM group- undecided  Partner's name: Franky \"[facility]\""\"  Employment: [employer]  [x]POPEYE folder  [x]Dating  [x] 1st trimester screening: M referral placed for 1st trimester screening place  [x]Fetal anatomy US ordered  [x] No added risk for PRE-E  [x] No increased risk for GDM  [x]No need for utox in labor  []COVID vaccine completed--no immunization info  []Pap- will do Postpartum    12-23wks________________________  []Offer AFP after 15 wks  [x]Rubella immune  [x]Hep B immune   [x]Varicella immune  [x]FLU shot - " declined    24-28wk_________________________  [x]EOB folder  [x]Labor plans: Labor plans: Epidural, birth plans scanned in. Desires quiet and peaceful room, no pics or video.  may be able to be there for support, but they don't have anyone to take care of their son Jessica. She has asked her in-laws to come and offer assistance  [x]Prenatal Ed- undecided  [x]: undecided  [x]Infant feeding plan- breast  [x][place] care provider choice- [facility]  [x]PP Contraception plan: undecided  [x]TDAP 24  []Rhogam N/A    29-35 wk________________________  []TOLAC consent done  [] Water birth interest  []GCT, passed  []RSV    36-37 wks______________________   [] GBS   []OTC PP meds sent  [x]PP recovery plans: PP recovery Boogie will get three weeks, then will work from home.  Cathy had postpartum depression/anxiety took escitalopram, stayed on escitalopram for two years. She is less concerned this will be an issue with this pregnancy, feeling much more stable and happy  []Planning CS-ERAS pkt    38-42 wks______________________  []IOL reason/plans  []Postdates BPP      Hypothyroidism (acquired)- : plan to check at next visit 2024     Priority: Medium     Synthroid 75mg every day  24- TSH 3.89, T4 1.29.    Check monthly, last on : plan to check at next visit   : prefers to check at next visit      Hx of postpartum depression, currently pregnant 2024     Priority: Medium     was on Lexapro, weaned from 20 mg to 5 mg and ended        Rectal fissure 2024     Priority: Medium     3/10/24 ED visit       Blood type: A POS. Rhogam is not indicated      Updated individualized prenatal care plan on the problem list for 3rd trimester  -BPPs scheduled at 36 weeks  -Cephalic position on BSUS today  -Wet prep GC/CT collected today   -Declined flu      Return to clinic in 2 weeks and prn if questions or concerns.     IAicha, am serving as a  scribe; to document services personally performed by  Dorothea Pratt CNM based on data collection and the provider's statements to me.     Aicha ALBERTO     The encounter was performed by me and scribed by the SNM. The scribed note accurately reflects my personal services and decisions made by me.     RADHA Saldaña, CARISSA

## 2024-10-10 LAB
C TRACH DNA SPEC QL PROBE+SIG AMP: NEGATIVE
N GONORRHOEA DNA SPEC QL NAA+PROBE: NEGATIVE

## 2024-10-20 ENCOUNTER — NURSE TRIAGE (OUTPATIENT)
Dept: NURSING | Facility: CLINIC | Age: 29
End: 2024-10-20
Payer: COMMERCIAL

## 2024-10-20 ENCOUNTER — HOSPITAL ENCOUNTER (OUTPATIENT)
Facility: CLINIC | Age: 29
Discharge: HOME | End: 2024-10-20
Attending: MIDWIFE | Admitting: MIDWIFE
Payer: COMMERCIAL

## 2024-10-20 ENCOUNTER — HOSPITAL ENCOUNTER (OUTPATIENT)
Facility: CLINIC | Age: 29
End: 2024-10-20
Admitting: MIDWIFE
Payer: COMMERCIAL

## 2024-10-20 VITALS — SYSTOLIC BLOOD PRESSURE: 116 MMHG | RESPIRATION RATE: 20 BRPM | DIASTOLIC BLOOD PRESSURE: 79 MMHG | TEMPERATURE: 97.8 F

## 2024-10-20 DIAGNOSIS — Z34.81 ENCOUNTER FOR SUPERVISION OF OTHER NORMAL PREGNANCY IN FIRST TRIMESTER: ICD-10-CM

## 2024-10-20 DIAGNOSIS — M79.18 PAIN IN SYMPHYSIS PUBIS DURING PREGNANCY: ICD-10-CM

## 2024-10-20 DIAGNOSIS — Q27.0 SINGLE UMBILICAL ARTERY: ICD-10-CM

## 2024-10-20 DIAGNOSIS — O99.891 PAIN IN SYMPHYSIS PUBIS DURING PREGNANCY: ICD-10-CM

## 2024-10-20 DIAGNOSIS — O32.0XX0 UNSTABLE FETAL LIE, SINGLE OR UNSPECIFIED FETUS: ICD-10-CM

## 2024-10-20 DIAGNOSIS — Z3A.35 35 WEEKS GESTATION OF PREGNANCY: Primary | ICD-10-CM

## 2024-10-20 LAB
ALBUMIN UR-MCNC: NEGATIVE MG/DL
APPEARANCE UR: ABNORMAL
BILIRUB UR QL STRIP: NEGATIVE
CLUE CELLS: PRESENT
COLOR UR AUTO: ABNORMAL
GLUCOSE UR STRIP-MCNC: NEGATIVE MG/DL
HGB UR QL STRIP: NEGATIVE
KETONES UR STRIP-MCNC: NEGATIVE MG/DL
LEUKOCYTE ESTERASE UR QL STRIP: NEGATIVE
NITRATE UR QL: NEGATIVE
PH UR STRIP: 7.5 [PH] (ref 5–7)
SP GR UR STRIP: 1.01 (ref 1–1.03)
TRICHOMONAS, WET PREP: ABNORMAL
UROBILINOGEN UR STRIP-MCNC: NORMAL MG/DL
WBC'S/HIGH POWER FIELD, WET PREP: ABNORMAL
YEAST, WET PREP: ABNORMAL

## 2024-10-20 PROCEDURE — 59025 FETAL NON-STRESS TEST: CPT | Mod: 26 | Performed by: MIDWIFE

## 2024-10-20 PROCEDURE — 99214 OFFICE O/P EST MOD 30 MIN: CPT | Mod: 25 | Performed by: MIDWIFE

## 2024-10-20 PROCEDURE — 87210 SMEAR WET MOUNT SALINE/INK: CPT | Performed by: MIDWIFE

## 2024-10-20 PROCEDURE — 81003 URINALYSIS AUTO W/O SCOPE: CPT | Performed by: MIDWIFE

## 2024-10-20 PROCEDURE — G0463 HOSPITAL OUTPT CLINIC VISIT: HCPCS | Mod: 25

## 2024-10-20 PROCEDURE — 87653 STREP B DNA AMP PROBE: CPT | Performed by: MIDWIFE

## 2024-10-20 PROCEDURE — 999N000104 HC STATISTIC NO CHARGE

## 2024-10-20 PROCEDURE — 59025 FETAL NON-STRESS TEST: CPT

## 2024-10-20 RX ORDER — ACETAMINOPHEN 325 MG/1
650 TABLET ORAL EVERY 4 HOURS PRN
Status: DISCONTINUED | OUTPATIENT
Start: 2024-10-20 | End: 2024-10-20 | Stop reason: HOSPADM

## 2024-10-20 RX ORDER — HYDROXYZINE HYDROCHLORIDE 50 MG/1
50 TABLET, FILM COATED ORAL EVERY 6 HOURS PRN
Status: DISCONTINUED | OUTPATIENT
Start: 2024-10-20 | End: 2024-10-20 | Stop reason: HOSPADM

## 2024-10-20 RX ORDER — HYDROXYZINE HYDROCHLORIDE 25 MG/1
25-50 TABLET, FILM COATED ORAL 2 TIMES DAILY PRN
Qty: 30 TABLET | Refills: 0 | Status: ON HOLD | OUTPATIENT
Start: 2024-10-20 | End: 2024-10-23

## 2024-10-20 ASSESSMENT — ACTIVITIES OF DAILY LIVING (ADL)
ADLS_ACUITY_SCORE: 20
ADLS_ACUITY_SCORE: 20

## 2024-10-20 NOTE — H&P
HOSPITAL TRIAGE NOTE  ===================    CHIEF COMPLAINT  ========================  Cathy Mace is a 29 year old patient presenting today at 35w4d for evaluation of pelvic pain.    Patient's last menstrual period was 2024 (exact date).  Estimated Date of Delivery: 2024       HPI  ==================   Pt presented reporting sharp pelvic and vaginal pain starting this past week and somewhat less tolerable since yesterday am  denies contractions   [facility]renatal record and labs [facility], through [employer] [facility].    CONTRACTIONS: not felt by patient  ABDOMINAL PAIN: none  FETAL MOVEMENT: active    VAGINAL BLEEDING: none  RUPTURE OF MEMBRANES: no  PELVIC PAIN: pressure and sharp with movements over SP area tuning lifting leg walking is uncomfortable  has tylenol at home has been self limiting activity     PREGNANCY COMPLICATIONS: History  labor previous pregnancy   Patient Active Problem List   Diagnosis    Hypothyroidism (acquired)    Hx of postpartum depression, currently pregnant    Rectal fissure    Encounter for supervision of other normal pregnancy in first trimester    Right wrist pain    Single umbilical artery-growth 9/- 83%tile,, weekly BPP @ 36w    Labor and delivery indication for care or intervention      # Pain Assessment:      10/20/2024     6:55 AM   Current Pain Score   Patient currently in pain? yes     REVIEW OF SYSTEMS  =====================  C: NEGATIVE for fever, chills  I: NEGATIVE for worrisome rashes, moles or lesions  E: NEGATIVE for vision changes or irritation  R: NEGATIVE for significant cough or SOB  CV: NEGATIVE for chest pain, palpitations or varicosities  GI: NEGATIVE for nausea, abdominal pain, heartburn, or change in bowel habits  : NEGATIVE for frequency, dysuria, or hematuria  M: NEGATIVE for significant arthralgias or myalgia  N: NEGATIVE for headache, weakness, dizziness or paresthesias  P: NEGATIVE for changes in mood or  "affect    PROBLEM LIST  ===============  Patient Active Problem List    Diagnosis Date Noted    Single umbilical artery-growth - 83%tile,, weekly BPP @ 36w 2024     Priority: Medium     SUA, growth - EFW 83%tile, AC 87%tile, breech.   weekly BPP @ 36w      Right wrist pain 2024     Priority: Medium    Encounter for supervision of other normal pregnancy in first trimester 2024     Priority: Medium     [facility] ([facility] Patient Provider Group choice: CNM group- undecided  Partner's name: Franky \"Donato-Lavon\"  Employment: [employer]  [x]NOB folder  [x]Dating  [x] 1st trimester screening: MFM referral placed for 1st trimester screening place  [x]Fetal anatomy US ordered  [x] No added risk for PRE-E  [x] No increased risk for GDM  [x]No need for utox in labor  []COVID vaccine completed--no immunization info  []Pap- will do Postpartum    12-23wks________________________  []Offer AFP after 15 wks  [x]Rubella immune  [x]Hep B immune   [x]Varicella immune  [x]FLU shot - declined    24-28wk_________________________  [x]EOB folder  [x]Labor plans: Labor plans: Epidural, birth plans scanned in. Desires quiet and peaceful room, no pics or video.  may be able to be there for support, but they don't have anyone to take care of their son Jessica. She has asked her in-laws to come and offer assistance  [x]Prenatal Ed- undecided  [x]: undecided  [x]Infant feeding plan- breast  [x][place] care provider choice- [facility]  [x]PP Contraception plan: undecided  [x]TDAP 24  []Rhogam N/A    29-35 wk________________________  []TOLAC consent done  [] Water birth interest  []GCT, passed  []RSV    36-37 wks______________________   [] GBS   []OTC PP meds sent  [x]PP recovery plans: PP recovery Boogie will get three weeks, then will work from home.  Cathy had postpartum depression/anxiety took escitalopram, stayed on escitalopram for two years. She is less concerned this will be an " issue with this pregnancy, feeling much more stable and happy  []Planning CS-ERAS pkt    38-42 wks______________________  []IOL reason/plans  []Postdates BPP      Hypothyroidism (acquired) 03/20/2024     Priority: Medium     Synthroid 75mg every day  4/25/24- TSH 3.89, T4 1.29.    Check monthly, last on 8/14 9/11: plan to check at next visit   10/7: wnl 2.21      Hx of postpartum depression, currently pregnant 03/20/2024     Priority: Medium     was on Lexapro, weaned from 20 mg to 5 mg and ended 04/03       Rectal fissure 03/20/2024     Priority: Medium     3/10/24 ED visit         HISTORIES  ==============  ALLERGIES:    No Known Allergies  PAST MEDICAL HISTORY  Past Medical History:   Diagnosis Date    Hypothyroidism (acquired) 03/20/2024    Post partum depression 03/20/2024    was on Lexapro, weaned from 20 mg to 5 mg and ended 04/03    Rectal fissure 03/20/2024     SOCIAL HISTORY  Social History     Socioeconomic History    Marital status:      Spouse name: Braydon    Number of children: 1    Years of education: Not on file    Highest education level: Not on file   Occupational History    Occupation: [employer]   Tobacco Use    Smoking status: Never    Smokeless tobacco: Never   Vaping Use    Vaping status: Not on file   Substance and Sexual Activity    Alcohol use: Never    Drug use: Never    Sexual activity: Yes     Partners: Male   Other Topics Concern    Not on file   Social History Narrative    Not on file     Social Determinants of Health     Financial Resource Strain: Low Risk  (9/5/2024)    Financial Resource Strain     Within the past 12 months, have you or your family members you live with been unable to get utilities (heat, electricity) when it was really needed?: No   Food Insecurity: Low Risk  (9/5/2024)    Food Insecurity     Within the past 12 months, did you worry that your food would run out before you got money to buy more?: No     Within the past 12 months, did the food you bought just not  last and you didn t have money to get more?: No   Transportation Needs: Low Risk  (2024)    Transportation Needs     Within the past 12 months, has lack of transportation kept you from medical appointments, getting your medicines, non-medical meetings or appointments, work, or from getting things that you need?: No   Physical Activity: Not on file   Stress: Not on file   Social Connections: Not on file   Interpersonal Safety: Low Risk  (2024)    Interpersonal Safety     Do you feel physically and emotionally safe where you currently live?: Yes     Within the past 12 months, have you been hit, slapped, kicked or otherwise physically hurt by someone?: No     Within the past 12 months, have you been humiliated or emotionally abused in other ways by your partner or ex-partner?: No   Housing Stability: Low Risk  (2024)    Housing Stability     Do you have housing? : Yes     Are you worried about losing your housing?: No   Recent Concern: Housing Stability - High Risk (2024)    Housing Stability     Do you have housing? : No     Are you worried about losing your housing?: No     PARTNER: spouse Warashids  FAMILY HISTORY  Family History   Problem Relation Age of Onset    No Known Problems Mother     No Known Problems Father     No Known Problems Sister     No Known Problems Brother     No Known Problems Brother     No Known Problems Maternal Grandmother         unknown    No Known Problems Maternal Grandfather         unknown    No Known Problems Paternal Grandmother         unknown    No Known Problems Paternal Grandfather         unknown     OB HISTORY  OB History    Para Term  AB Living   2 1 0 1 0 1   SAB IAB Ectopic Multiple Live Births   0 0 0 0 1      # Outcome Date GA Lbr Raymond/2nd Weight Sex Type Anes PTL Lv   2 Current            1  21 36w4d   M Vag-Spont EPI  CARMEN      Name: Jessica     Prenatal Labs:   Lab Results   Component Value Date    AS Negative 2024    HEPBANG  Nonreactive 04/25/2024    RUQIGG Positive 04/25/2024    HGB 12.1 08/14/2024     Rubella- immune    ULTRASOUND(s) reviewed: 9/26/24  IMPRESSION  ---------------------------------------------------------------------------------------------------------  1. [place] pregnancy at 32w 1d gestational age.Breech    2. A 2 vessel cord is again seen. None of the other anomalies commonly detected by ultrasound were evident in the limited fetal anatomic survey as described above.  3. Growth parameters and estimated fetal weight were appropriate for gestational age.  4. The amniotic fluid volume appeared nor  EXAM  ============  /79 (BP Location: Right arm, Patient Position: Semi-Martinez's, Cuff Size: Adult Regular)   Temp 97.8  F (36.6  C) (Oral)   Resp 20   LMP 02/14/2024 (Exact Date)   GENERAL APPEARANCE: healthy, alert and no distress  RESP: lungs clear to auscultation - no rales, rhonchi or wheezes  CV: regular rates and rhythm, normal S1 S2, no S3 or S4 and no murmur,and no varicosities  ABDOMEN:  soft, nontender, no epigastric pain  SKIN: no suspicious lesions or rashes  NEURO: Denies headache, blurred vision, other vision changes  PSYCH: mentation appears normal. and affect normal/bright  MS/ LEGS: Reflexes normal bilaterally    CONTRACTIONS: none   FETAL HEART TONES: continuous EFM- baseline 130 with moderate variability and positive accelerations. No decelerations.  NST: REACTIVE  PELVIC EXAM: closed/ 50%/ Posterior/ average/ FLOATING BREECH BY US   BLOOD: no  DISCHARGE: none    ROM: no  FERN: not done  LABS: UA, UC, Wet prep, and GBS  Recent GC CHLamydia screen 10/7  Lab results reviewed-   DIAGNOSIS  ============  35w4d seen on the Birthplace Triage for abdominal pain SP pain in pregnancy   NST: REACTIVE  Fetal Heart rate tracing:category one    PLAN  ============  (Z3A.35) 35 weeks gestation of pregnancy  (primary encounter diagnosis)  Plan: hydrOXYzine HCl (ATARAX) 25 MG tablet,          Neck/Shoulder/Back/Abdomen Bracing Supplies         Order Sacroiliac Belt; S39.013D - Strain on the        pelvis          (O99.891,  M79.18) Pain in symphysis pubis during pregnancy  Plan: hydrOXYzine HCl (ATARAX) 25 MG tablet,         Neck/Shoulder/Back/Abdomen Bracing Supplies         Order Sacroiliac Belt; S39.013D - Strain on the        pelvis        Physical therapy referral   Discussed exercises online only if does not exacerbate pain,  ice, heat rest tylenol     (Z34.81) Encounter for supervision of other normal pregnancy in first trimester    Plan: hydrOXYzine HCl (ATARAX) 25 MG tablet          (O32.0XX0) Unstable fetal lie, single or unspecified fetus    Plan:reassess in clinic this week briefly discussed ECV 37 wks if persistent   Discusssed coming to hospital immediately with labor or SROM and confirming presentation            Discharge to home with PTL instructions per discharge instruction form  Call or return to the Birthplace with contractions, cramping, abdominal or pelvic pain, vaginal bleeding, leaking fluid or decreased fetal movement.  Follow up- clinic appt this RADHA Gonzalez CNM

## 2024-10-20 NOTE — PLAN OF CARE
Pt assessed by JULIÁN De León CNM.  SVE closed.  UA & vaginal labs collected and sent.  NST reactive and strip reviewed with CNM.  Plan: RTC as scheduled. Pt education on symphysis pubis, given binder, and referral for PT/sacroiliac belt. Pt agrees with plan.  Discharged ambulatory with  at 0810.

## 2024-10-20 NOTE — PLAN OF CARE
Patient arrived to birthplace at 0650 for vaginal/pelvic pain. Patient reports pain began 10/19 and has gotten worse. VSS, afebrile. EFM and TOCO applied, see flowsheets for details. Sarthak CNM notified of patient arrival.

## 2024-10-20 NOTE — DISCHARGE INSTRUCTIONS
Learning About When to Call Your Doctor During Pregnancy (After 20 Weeks)  Overview  It's common to have concerns about what might be a problem when you're pregnant. Most pregnancies don't have any serious problems. But it's still important to know when to call your doctor if you have certain symptoms or signs of labor.  These are general suggestions. Your doctor may give you some more information about when to call.  When to call your doctor (after 20 weeks)  Call 911  anytime you think you may need emergency care. For example, call if:  You have severe vaginal bleeding. This means you are soaking through a pad each hour for 2 or more hours.  You have sudden, severe pain in your belly.  You have chest pain, are short of breath, or cough up blood.  You passed out (lost consciousness).  You have a seizure.  You see or feel the umbilical cord.  You think you are about to deliver your baby and can't make it safely to the hospital or birthing center.  Call your doctor now or seek immediate medical care if:  You have vaginal bleeding.  You have belly pain.  You have a fever.  You are dizzy or lightheaded, or you feel like you may faint.  You have signs of a blood clot in your leg (called a deep vein thrombosis), such as:  Pain in the calf, back of the knee, thigh, or groin.  Swelling in your leg or groin.  A color change on the leg or groin. The skin may be reddish or purplish, depending on your usual skin color.  You have symptoms of preeclampsia, such as:  Sudden swelling of your face, hands, or feet.  New vision problems (such as dimness, blurring, or seeing spots).  A severe headache.  You have a sudden release of fluid from your vagina. (You think your water broke.)  You've been having regular contractions for an hour. This means that you've had at least 6 contractions within 1 hour, even after you change your position and drink fluids.  You notice that your baby has stopped moving or is moving less than  "normal.  You have signs of heart failure, such as:  New or increased shortness of breath.  New or worse swelling in your legs, ankles, or feet.  Sudden weight gain, such as more than 2 to 3 pounds in a day or 5 pounds in a week.  Feeling so tired or weak that you cannot do your usual activities.  You have symptoms of a urinary tract infection. These may include:  Pain or burning when you urinate.  A frequent need to urinate without being able to pass much urine.  Pain in the flank, which is just below the rib cage and above the waist on either side of the back.  Blood in your urine.  Watch closely for changes in your health, and be sure to contact your doctor if:  You have vaginal discharge that smells bad.  You feel sad, anxious, or hopeless for more than a few days.  You have skin changes, such as a rash, itching, or a yellow color to your skin.  You have other concerns about your pregnancy.  If you have labor signs at 37 weeks or more  If you have signs of labor at 37 weeks or more, your doctor may tell you to call when your labor becomes more active. Symptoms of active labor include:  Contractions that are regular.  Contractions that are less than 5 minutes apart.  Contractions that are hard to talk through.  Follow-up care is a key part of your treatment and safety. Be sure to make and go to all appointments, and call your doctor if you are having problems. It's also a good idea to know your test results and keep a list of the medicines you take.  Where can you learn more?  Go to https://www.[employer].net/patiented  Enter N531 in the search box to learn more about \"Learning About When to Call Your Doctor During Pregnancy (After 20 Weeks).\"  Current as of: July 10, 2023  Content Version: 14.2 2024 [facility].   Care instructions adapted under license by your healthcare professional. If you have questions about a medical condition or this instruction, always ask your healthcare professional. " [employer] disclaims any warranty or liability for your use of this information.

## 2024-10-20 NOTE — TELEPHONE ENCOUNTER
"  OB Triage Call      Is patient's OB/Midwife with the [facility] or [facility]? [facility]- Proceed with triage     Reason for call: severe abdomen/ pelvic pain     Assessment: Patient is having pain and pressure in abdomen and pelvic area. Patient rates pain 9-10/10 Patient is unable stand or use L leg. States that pain has been constant and present for longer than an hour     Plan: Call 911 EMS now    Patient plans to deliver at       Patient's primary OB Provider is [facility].      Per protocol recommendations  patient to call 911 EMS now       Is patient's delivering hospital on divert? Does not apply due to patient advised to call 911 at this time.       35w4d    Estimated Date of Delivery: 2024        OB History    Para Term  AB Living   2 1 0 1 0 1   SAB IAB Ectopic Multiple Live Births   0 0 0 0 1      # Outcome Date GA Lbr Raymond/2nd Weight Sex Type Anes PTL Lv   2 Current            1  21 36w4d   M Vag-Spont EPI  CARMEN      Name: Jessica       No results found for: \"GBS\"       Maggie Swenson RN   Reason for Disposition   [1] Pelvic pain AND [2] pregnant 20 or more weeks   [1] SEVERE abdominal pain (e.g., excruciating) AND [2] constant AND [3] present > 1 hour    Additional Information   Negative: Passed out (i.e., lost consciousness, collapsed and was not responding)   Negative: Shock suspected (e.g., cold/pale/clammy skin, too weak to stand, low BP, rapid pulse)   Negative: Difficult to awaken or acting confused (e.g., disoriented, slurred speech)   Negative: Shock suspected (e.g., cold/pale/clammy skin, too weak to stand, low BP, rapid pulse)   Negative: Passed out (i.e., lost consciousness, collapsed and was not responding)   Negative: Sounds like a life-threatening emergency to the triager   Negative: Menstrual cramps is main concern   Negative: Abdominal (stomach) pain is main concern   Negative: Vulvar (external genital area) pain or symptoms (e.g., dryness, " sores, redness, blisters, bumps) is main concern   Negative: Rectal pain is main concern   Negative: Hip pain is main concern   Negative: Urination pain is main concern (pain with passing urine)   Negative: [1] Pelvic pain AND [2] pregnant < 20 weeks    Protocols used: Pregnancy - Abdominal Pain Greater Than 20 Weeks EGA-A-AH, Pelvic Pain - Female-A-AH

## 2024-10-21 ENCOUNTER — TELEPHONE (OUTPATIENT)
Dept: OBGYN | Facility: CLINIC | Age: 29
End: 2024-10-21
Payer: COMMERCIAL

## 2024-10-21 ENCOUNTER — ANESTHESIA EVENT (OUTPATIENT)
Dept: OBGYN | Facility: CLINIC | Age: 29
End: 2024-10-21
Payer: COMMERCIAL

## 2024-10-21 ENCOUNTER — HOSPITAL ENCOUNTER (INPATIENT)
Facility: CLINIC | Age: 29
LOS: 2 days | Discharge: HOME | End: 2024-10-23
Attending: ADVANCED PRACTICE MIDWIFE | Admitting: OBSTETRICS & GYNECOLOGY
Payer: COMMERCIAL

## 2024-10-21 ENCOUNTER — ANESTHESIA (OUTPATIENT)
Dept: OBGYN | Facility: CLINIC | Age: 29
End: 2024-10-21
Payer: COMMERCIAL

## 2024-10-21 PROBLEM — O42.919 PRETERM PREMATURE RUPTURE OF MEMBRANES: Status: ACTIVE | Noted: 2024-10-21

## 2024-10-21 PROBLEM — O32.9XX0 MALPRESENTATION BEFORE ONSET OF LABOR: Status: ACTIVE | Noted: 2024-10-21

## 2024-10-21 LAB
ABO/RH(D): NORMAL
ANTIBODY SCREEN: NEGATIVE
ERYTHROCYTE [DISTWIDTH] IN BLOOD BY AUTOMATED COUNT: 15.1 % (ref 10–15)
GP B STREP DNA SPEC QL NAA+PROBE: NEGATIVE
HCT VFR BLD AUTO: 37.1 % (ref 35–47)
HGB BLD-MCNC: 12.2 G/DL (ref 11.7–15.7)
MCH RBC QN AUTO: 28.8 PG (ref 26.5–33)
MCHC RBC AUTO-ENTMCNC: 32.9 G/DL (ref 31.5–36.5)
MCV RBC AUTO: 88 FL (ref 78–100)
PLATELET # BLD AUTO: 155 10E3/UL (ref 150–450)
RBC # BLD AUTO: 4.24 10E6/UL (ref 3.8–5.2)
RUPTURE OF FETAL MEMBRANES BY ROM PLUS: POSITIVE
SPECIMEN EXPIRATION DATE: NORMAL
T PALLIDUM AB SER QL: NONREACTIVE
WBC # BLD AUTO: 8.7 10E3/UL (ref 4–11)

## 2024-10-21 PROCEDURE — 85014 HEMATOCRIT: CPT | Performed by: OBSTETRICS & GYNECOLOGY

## 2024-10-21 PROCEDURE — 250N000011 HC RX IP 250 OP 636: Performed by: NURSE ANESTHETIST, CERTIFIED REGISTERED

## 2024-10-21 PROCEDURE — 59025 FETAL NON-STRESS TEST: CPT

## 2024-10-21 PROCEDURE — 999N000105 HC STATISTIC NO DOCUMENTATION TO SUPPORT CHARGE

## 2024-10-21 PROCEDURE — 250N000011 HC RX IP 250 OP 636: Performed by: ANESTHESIOLOGY

## 2024-10-21 PROCEDURE — 86901 BLOOD TYPING SEROLOGIC RH(D): CPT | Performed by: OBSTETRICS & GYNECOLOGY

## 2024-10-21 PROCEDURE — 370N000017 HC ANESTHESIA TECHNICAL FEE, PER MIN: Performed by: OBSTETRICS & GYNECOLOGY

## 2024-10-21 PROCEDURE — 258N000003 HC RX IP 258 OP 636: Performed by: NURSE ANESTHETIST, CERTIFIED REGISTERED

## 2024-10-21 PROCEDURE — 86780 TREPONEMA PALLIDUM: CPT | Performed by: OBSTETRICS & GYNECOLOGY

## 2024-10-21 PROCEDURE — 272N000001 HC OR GENERAL SUPPLY STERILE: Performed by: OBSTETRICS & GYNECOLOGY

## 2024-10-21 PROCEDURE — 250N000011 HC RX IP 250 OP 636: Performed by: OBSTETRICS & GYNECOLOGY

## 2024-10-21 PROCEDURE — 250N000011 HC RX IP 250 OP 636: Mod: JW | Performed by: ANESTHESIOLOGY

## 2024-10-21 PROCEDURE — 250N000013 HC RX MED GY IP 250 OP 250 PS 637: Performed by: OBSTETRICS & GYNECOLOGY

## 2024-10-21 PROCEDURE — 36415 COLL VENOUS BLD VENIPUNCTURE: CPT | Performed by: OBSTETRICS & GYNECOLOGY

## 2024-10-21 PROCEDURE — 59510 CESAREAN DELIVERY: CPT | Performed by: NURSE ANESTHETIST, CERTIFIED REGISTERED

## 2024-10-21 PROCEDURE — 999N000016 HC STATISTIC ATTENDANCE AT DELIVERY

## 2024-10-21 PROCEDURE — 710N000009 HC RECOVERY PHASE 1, LEVEL 1, PER MIN: Performed by: OBSTETRICS & GYNECOLOGY

## 2024-10-21 PROCEDURE — 360N000076 HC SURGERY LEVEL 3, PER MIN: Performed by: OBSTETRICS & GYNECOLOGY

## 2024-10-21 PROCEDURE — 258N000003 HC RX IP 258 OP 636: Performed by: OBSTETRICS & GYNECOLOGY

## 2024-10-21 PROCEDURE — G0463 HOSPITAL OUTPT CLINIC VISIT: HCPCS | Mod: 25

## 2024-10-21 PROCEDURE — 84112 EVAL AMNIOTIC FLUID PROTEIN: CPT | Performed by: ADVANCED PRACTICE MIDWIFE

## 2024-10-21 PROCEDURE — 120N000012 HC R&B POSTPARTUM

## 2024-10-21 PROCEDURE — 250N000009 HC RX 250: Performed by: OBSTETRICS & GYNECOLOGY

## 2024-10-21 PROCEDURE — 86900 BLOOD TYPING SEROLOGIC ABO: CPT | Performed by: OBSTETRICS & GYNECOLOGY

## 2024-10-21 PROCEDURE — 59510 CESAREAN DELIVERY: CPT | Performed by: ANESTHESIOLOGY

## 2024-10-21 RX ORDER — ONDANSETRON 4 MG/1
4 TABLET, ORALLY DISINTEGRATING ORAL EVERY 6 HOURS PRN
Status: DISCONTINUED | OUTPATIENT
Start: 2024-10-21 | End: 2024-10-21

## 2024-10-21 RX ORDER — CEFAZOLIN SODIUM/WATER 2 G/20 ML
SYRINGE (ML) INTRAVENOUS
Status: DISCONTINUED
Start: 2024-10-21 | End: 2024-10-21 | Stop reason: HOSPADM

## 2024-10-21 RX ORDER — ONDANSETRON 4 MG/1
4 TABLET, ORALLY DISINTEGRATING ORAL EVERY 6 HOURS PRN
Status: DISCONTINUED | OUTPATIENT
Start: 2024-10-21 | End: 2024-10-21 | Stop reason: HOSPADM

## 2024-10-21 RX ORDER — MORPHINE SULFATE 1 MG/ML
150 INJECTION, SOLUTION EPIDURAL; INTRATHECAL; INTRAVENOUS ONCE
Status: DISCONTINUED | OUTPATIENT
Start: 2024-10-21 | End: 2024-10-23 | Stop reason: HOSPADM

## 2024-10-21 RX ORDER — LOPERAMIDE HYDROCHLORIDE 2 MG/1
4 CAPSULE ORAL
Status: DISCONTINUED | OUTPATIENT
Start: 2024-10-21 | End: 2024-10-21 | Stop reason: HOSPADM

## 2024-10-21 RX ORDER — ONDANSETRON 4 MG/1
4 TABLET, ORALLY DISINTEGRATING ORAL EVERY 6 HOURS PRN
Status: DISCONTINUED | OUTPATIENT
Start: 2024-10-21 | End: 2024-10-23 | Stop reason: HOSPADM

## 2024-10-21 RX ORDER — METOCLOPRAMIDE 10 MG/1
10 TABLET ORAL EVERY 6 HOURS PRN
Status: DISCONTINUED | OUTPATIENT
Start: 2024-10-21 | End: 2024-10-22

## 2024-10-21 RX ORDER — NALOXONE HYDROCHLORIDE 0.4 MG/ML
0.2 INJECTION, SOLUTION INTRAMUSCULAR; INTRAVENOUS; SUBCUTANEOUS
Status: DISCONTINUED | OUTPATIENT
Start: 2024-10-21 | End: 2024-10-23 | Stop reason: HOSPADM

## 2024-10-21 RX ORDER — OXYTOCIN 10 [USP'U]/ML
10 INJECTION, SOLUTION INTRAMUSCULAR; INTRAVENOUS
Status: DISCONTINUED | OUTPATIENT
Start: 2024-10-21 | End: 2024-10-23 | Stop reason: HOSPADM

## 2024-10-21 RX ORDER — AMOXICILLIN 250 MG
1 CAPSULE ORAL 2 TIMES DAILY
Status: DISCONTINUED | OUTPATIENT
Start: 2024-10-21 | End: 2024-10-23 | Stop reason: HOSPADM

## 2024-10-21 RX ORDER — AMOXICILLIN 250 MG
2 CAPSULE ORAL 2 TIMES DAILY
Status: DISCONTINUED | OUTPATIENT
Start: 2024-10-21 | End: 2024-10-23 | Stop reason: HOSPADM

## 2024-10-21 RX ORDER — METHYLERGONOVINE MALEATE 0.2 MG/ML
200 INJECTION INTRAVENOUS
Status: DISCONTINUED | OUTPATIENT
Start: 2024-10-21 | End: 2024-10-23 | Stop reason: HOSPADM

## 2024-10-21 RX ORDER — KETOROLAC TROMETHAMINE 30 MG/ML
30 INJECTION, SOLUTION INTRAMUSCULAR; INTRAVENOUS EVERY 6 HOURS
Status: COMPLETED | OUTPATIENT
Start: 2024-10-21 | End: 2024-10-22

## 2024-10-21 RX ORDER — LOPERAMIDE HYDROCHLORIDE 2 MG/1
2 CAPSULE ORAL
Status: DISCONTINUED | OUTPATIENT
Start: 2024-10-21 | End: 2024-10-21 | Stop reason: HOSPADM

## 2024-10-21 RX ORDER — TRANEXAMIC ACID 10 MG/ML
1 INJECTION, SOLUTION INTRAVENOUS EVERY 30 MIN PRN
Status: DISCONTINUED | OUTPATIENT
Start: 2024-10-21 | End: 2024-10-23 | Stop reason: HOSPADM

## 2024-10-21 RX ORDER — SODIUM CHLORIDE, SODIUM LACTATE, POTASSIUM CHLORIDE, CALCIUM CHLORIDE 600; 310; 30; 20 MG/100ML; MG/100ML; MG/100ML; MG/100ML
INJECTION, SOLUTION INTRAVENOUS CONTINUOUS
Status: DISCONTINUED | OUTPATIENT
Start: 2024-10-21 | End: 2024-10-21

## 2024-10-21 RX ORDER — AZITHROMYCIN 500 MG/1
INJECTION, POWDER, LYOPHILIZED, FOR SOLUTION INTRAVENOUS
Status: COMPLETED
Start: 2024-10-21 | End: 2024-10-21

## 2024-10-21 RX ORDER — METHYLERGONOVINE MALEATE 0.2 MG/ML
200 INJECTION INTRAVENOUS
Status: DISCONTINUED | OUTPATIENT
Start: 2024-10-21 | End: 2024-10-21 | Stop reason: HOSPADM

## 2024-10-21 RX ORDER — CITRIC ACID/SODIUM CITRATE 334-500MG
SOLUTION, ORAL ORAL
Status: COMPLETED
Start: 2024-10-21 | End: 2024-10-21

## 2024-10-21 RX ORDER — ONDANSETRON 2 MG/ML
4 INJECTION INTRAMUSCULAR; INTRAVENOUS EVERY 6 HOURS PRN
Status: DISCONTINUED | OUTPATIENT
Start: 2024-10-21 | End: 2024-10-21 | Stop reason: HOSPADM

## 2024-10-21 RX ORDER — ACETAMINOPHEN 325 MG/1
TABLET ORAL
Status: COMPLETED
Start: 2024-10-21 | End: 2024-10-21

## 2024-10-21 RX ORDER — CARBOPROST TROMETHAMINE 250 UG/ML
250 INJECTION, SOLUTION INTRAMUSCULAR
Status: DISCONTINUED | OUTPATIENT
Start: 2024-10-21 | End: 2024-10-21 | Stop reason: HOSPADM

## 2024-10-21 RX ORDER — MODIFIED LANOLIN
OINTMENT (GRAM) TOPICAL
Status: DISCONTINUED | OUTPATIENT
Start: 2024-10-21 | End: 2024-10-23 | Stop reason: HOSPADM

## 2024-10-21 RX ORDER — OXYTOCIN/0.9 % SODIUM CHLORIDE 30/500 ML
340 PLASTIC BAG, INJECTION (ML) INTRAVENOUS CONTINUOUS PRN
Status: DISCONTINUED | OUTPATIENT
Start: 2024-10-21 | End: 2024-10-23 | Stop reason: HOSPADM

## 2024-10-21 RX ORDER — LIDOCAINE 40 MG/G
CREAM TOPICAL
Status: DISCONTINUED | OUTPATIENT
Start: 2024-10-21 | End: 2024-10-21 | Stop reason: HOSPADM

## 2024-10-21 RX ORDER — IBUPROFEN 400 MG/1
800 TABLET, FILM COATED ORAL EVERY 6 HOURS
Status: DISCONTINUED | OUTPATIENT
Start: 2024-10-22 | End: 2024-10-23 | Stop reason: HOSPADM

## 2024-10-21 RX ORDER — CITRIC ACID/SODIUM CITRATE 334-500MG
30 SOLUTION, ORAL ORAL
Status: DISCONTINUED | OUTPATIENT
Start: 2024-10-21 | End: 2024-10-21 | Stop reason: HOSPADM

## 2024-10-21 RX ORDER — HYDROCORTISONE 25 MG/G
CREAM TOPICAL 3 TIMES DAILY PRN
Status: DISCONTINUED | OUTPATIENT
Start: 2024-10-21 | End: 2024-10-23 | Stop reason: HOSPADM

## 2024-10-21 RX ORDER — PROCHLORPERAZINE MALEATE 10 MG
10 TABLET ORAL EVERY 6 HOURS PRN
Status: DISCONTINUED | OUTPATIENT
Start: 2024-10-21 | End: 2024-10-23 | Stop reason: HOSPADM

## 2024-10-21 RX ORDER — METOCLOPRAMIDE HYDROCHLORIDE 5 MG/ML
10 INJECTION INTRAMUSCULAR; INTRAVENOUS EVERY 6 HOURS PRN
Status: DISCONTINUED | OUTPATIENT
Start: 2024-10-21 | End: 2024-10-23 | Stop reason: HOSPADM

## 2024-10-21 RX ORDER — METOCLOPRAMIDE 10 MG/1
10 TABLET ORAL EVERY 6 HOURS PRN
Status: DISCONTINUED | OUTPATIENT
Start: 2024-10-21 | End: 2024-10-23 | Stop reason: HOSPADM

## 2024-10-21 RX ORDER — OXYCODONE HYDROCHLORIDE 5 MG/1
5 TABLET ORAL EVERY 4 HOURS PRN
Status: DISCONTINUED | OUTPATIENT
Start: 2024-10-21 | End: 2024-10-23 | Stop reason: HOSPADM

## 2024-10-21 RX ORDER — MISOPROSTOL 200 UG/1
400 TABLET ORAL
Status: DISCONTINUED | OUTPATIENT
Start: 2024-10-21 | End: 2024-10-21 | Stop reason: HOSPADM

## 2024-10-21 RX ORDER — CEFAZOLIN SODIUM/WATER 2 G/20 ML
2 SYRINGE (ML) INTRAVENOUS
Status: COMPLETED | OUTPATIENT
Start: 2024-10-21 | End: 2024-10-21

## 2024-10-21 RX ORDER — LOPERAMIDE HYDROCHLORIDE 2 MG/1
2 CAPSULE ORAL
Status: DISCONTINUED | OUTPATIENT
Start: 2024-10-21 | End: 2024-10-23 | Stop reason: HOSPADM

## 2024-10-21 RX ORDER — MISOPROSTOL 200 UG/1
400 TABLET ORAL
Status: DISCONTINUED | OUTPATIENT
Start: 2024-10-21 | End: 2024-10-23 | Stop reason: HOSPADM

## 2024-10-21 RX ORDER — CEFAZOLIN SODIUM/WATER 2 G/20 ML
2 SYRINGE (ML) INTRAVENOUS SEE ADMIN INSTRUCTIONS
Status: DISCONTINUED | OUTPATIENT
Start: 2024-10-21 | End: 2024-10-21 | Stop reason: HOSPADM

## 2024-10-21 RX ORDER — MISOPROSTOL 200 UG/1
800 TABLET ORAL
Status: DISCONTINUED | OUTPATIENT
Start: 2024-10-21 | End: 2024-10-21 | Stop reason: HOSPADM

## 2024-10-21 RX ORDER — MORPHINE SULFATE 1 MG/ML
150 INJECTION, SOLUTION EPIDURAL; INTRATHECAL; INTRAVENOUS ONCE
Status: DISCONTINUED | OUTPATIENT
Start: 2024-10-21 | End: 2024-10-21 | Stop reason: HOSPADM

## 2024-10-21 RX ORDER — METOCLOPRAMIDE 10 MG/1
10 TABLET ORAL EVERY 6 HOURS PRN
Status: DISCONTINUED | OUTPATIENT
Start: 2024-10-21 | End: 2024-10-21 | Stop reason: HOSPADM

## 2024-10-21 RX ORDER — SODIUM PHOSPHATE,MONO-DIBASIC 19G-7G/118
1 ENEMA (ML) RECTAL DAILY PRN
Status: DISCONTINUED | OUTPATIENT
Start: 2024-10-23 | End: 2024-10-23 | Stop reason: HOSPADM

## 2024-10-21 RX ORDER — CARBOPROST TROMETHAMINE 250 UG/ML
250 INJECTION, SOLUTION INTRAMUSCULAR
Status: DISCONTINUED | OUTPATIENT
Start: 2024-10-21 | End: 2024-10-23 | Stop reason: HOSPADM

## 2024-10-21 RX ORDER — METOCLOPRAMIDE HYDROCHLORIDE 5 MG/ML
10 INJECTION INTRAMUSCULAR; INTRAVENOUS EVERY 6 HOURS PRN
Status: DISCONTINUED | OUTPATIENT
Start: 2024-10-21 | End: 2024-10-22

## 2024-10-21 RX ORDER — TRANEXAMIC ACID 10 MG/ML
1 INJECTION, SOLUTION INTRAVENOUS EVERY 30 MIN PRN
Status: DISCONTINUED | OUTPATIENT
Start: 2024-10-21 | End: 2024-10-21 | Stop reason: HOSPADM

## 2024-10-21 RX ORDER — BISACODYL 10 MG
10 SUPPOSITORY, RECTAL RECTAL DAILY PRN
Status: DISCONTINUED | OUTPATIENT
Start: 2024-10-23 | End: 2024-10-23 | Stop reason: HOSPADM

## 2024-10-21 RX ORDER — OXYTOCIN/0.9 % SODIUM CHLORIDE 30/500 ML
100-340 PLASTIC BAG, INJECTION (ML) INTRAVENOUS CONTINUOUS PRN
Status: DISCONTINUED | OUTPATIENT
Start: 2024-10-21 | End: 2024-10-23 | Stop reason: HOSPADM

## 2024-10-21 RX ORDER — PROCHLORPERAZINE MALEATE 5 MG/1
10 TABLET ORAL EVERY 6 HOURS PRN
Status: DISCONTINUED | OUTPATIENT
Start: 2024-10-21 | End: 2024-10-21 | Stop reason: HOSPADM

## 2024-10-21 RX ORDER — NALBUPHINE HYDROCHLORIDE 10 MG/ML
2.5-5 INJECTION INTRAMUSCULAR; INTRAVENOUS; SUBCUTANEOUS EVERY 6 HOURS PRN
Status: DISCONTINUED | OUTPATIENT
Start: 2024-10-21 | End: 2024-10-23 | Stop reason: HOSPADM

## 2024-10-21 RX ORDER — KETOROLAC TROMETHAMINE 30 MG/ML
INJECTION, SOLUTION INTRAMUSCULAR; INTRAVENOUS PRN
Status: DISCONTINUED | OUTPATIENT
Start: 2024-10-21 | End: 2024-10-21

## 2024-10-21 RX ORDER — DEXTROSE, SODIUM CHLORIDE, SODIUM LACTATE, POTASSIUM CHLORIDE, AND CALCIUM CHLORIDE 5; .6; .31; .03; .02 G/100ML; G/100ML; G/100ML; G/100ML; G/100ML
INJECTION, SOLUTION INTRAVENOUS CONTINUOUS
Status: DISCONTINUED | OUTPATIENT
Start: 2024-10-21 | End: 2024-10-23 | Stop reason: HOSPADM

## 2024-10-21 RX ORDER — KETOROLAC TROMETHAMINE 30 MG/ML
30 INJECTION, SOLUTION INTRAMUSCULAR; INTRAVENOUS EVERY 6 HOURS
Status: DISCONTINUED | OUTPATIENT
Start: 2024-10-22 | End: 2024-10-21

## 2024-10-21 RX ORDER — LIDOCAINE 40 MG/G
CREAM TOPICAL
Status: DISCONTINUED | OUTPATIENT
Start: 2024-10-21 | End: 2024-10-23 | Stop reason: HOSPADM

## 2024-10-21 RX ORDER — EPHEDRINE SULFATE 50 MG/ML
5 INJECTION, SOLUTION INTRAMUSCULAR; INTRAVENOUS; SUBCUTANEOUS
Status: DISCONTINUED | OUTPATIENT
Start: 2024-10-21 | End: 2024-10-23 | Stop reason: HOSPADM

## 2024-10-21 RX ORDER — EPHEDRINE SULFATE 50 MG/ML
5 INJECTION, SOLUTION INTRAMUSCULAR; INTRAVENOUS; SUBCUTANEOUS
Status: DISCONTINUED | OUTPATIENT
Start: 2024-10-21 | End: 2024-10-21 | Stop reason: HOSPADM

## 2024-10-21 RX ORDER — ONDANSETRON 2 MG/ML
4 INJECTION INTRAMUSCULAR; INTRAVENOUS EVERY 6 HOURS PRN
Status: DISCONTINUED | OUTPATIENT
Start: 2024-10-21 | End: 2024-10-23 | Stop reason: HOSPADM

## 2024-10-21 RX ORDER — CITRIC ACID/SODIUM CITRATE 334-500MG
30 SOLUTION, ORAL ORAL ONCE
Status: DISCONTINUED | OUTPATIENT
Start: 2024-10-21 | End: 2024-10-21 | Stop reason: HOSPADM

## 2024-10-21 RX ORDER — MISOPROSTOL 200 UG/1
800 TABLET ORAL
Status: DISCONTINUED | OUTPATIENT
Start: 2024-10-21 | End: 2024-10-23 | Stop reason: HOSPADM

## 2024-10-21 RX ORDER — NALOXONE HYDROCHLORIDE 0.4 MG/ML
0.4 INJECTION, SOLUTION INTRAMUSCULAR; INTRAVENOUS; SUBCUTANEOUS
Status: DISCONTINUED | OUTPATIENT
Start: 2024-10-21 | End: 2024-10-23 | Stop reason: HOSPADM

## 2024-10-21 RX ORDER — ONDANSETRON 2 MG/ML
4 INJECTION INTRAMUSCULAR; INTRAVENOUS EVERY 6 HOURS PRN
Status: DISCONTINUED | OUTPATIENT
Start: 2024-10-21 | End: 2024-10-21

## 2024-10-21 RX ORDER — METOCLOPRAMIDE HYDROCHLORIDE 5 MG/ML
10 INJECTION INTRAMUSCULAR; INTRAVENOUS EVERY 6 HOURS PRN
Status: DISCONTINUED | OUTPATIENT
Start: 2024-10-21 | End: 2024-10-21 | Stop reason: HOSPADM

## 2024-10-21 RX ORDER — PROCHLORPERAZINE MALEATE 10 MG
10 TABLET ORAL EVERY 6 HOURS PRN
Status: DISCONTINUED | OUTPATIENT
Start: 2024-10-21 | End: 2024-10-22

## 2024-10-21 RX ORDER — MORPHINE SULFATE 1 MG/ML
INJECTION, SOLUTION EPIDURAL; INTRATHECAL; INTRAVENOUS
Status: DISCONTINUED | OUTPATIENT
Start: 2024-10-21 | End: 2024-10-21

## 2024-10-21 RX ORDER — AZITHROMYCIN 500 MG/1
500 INJECTION, POWDER, LYOPHILIZED, FOR SOLUTION INTRAVENOUS
Status: COMPLETED | OUTPATIENT
Start: 2024-10-21 | End: 2024-10-21

## 2024-10-21 RX ORDER — ACETAMINOPHEN 325 MG/1
975 TABLET ORAL EVERY 6 HOURS
Status: DISCONTINUED | OUTPATIENT
Start: 2024-10-21 | End: 2024-10-23 | Stop reason: HOSPADM

## 2024-10-21 RX ORDER — OXYTOCIN/0.9 % SODIUM CHLORIDE 30/500 ML
340 PLASTIC BAG, INJECTION (ML) INTRAVENOUS CONTINUOUS PRN
Status: DISCONTINUED | OUTPATIENT
Start: 2024-10-21 | End: 2024-10-21 | Stop reason: HOSPADM

## 2024-10-21 RX ORDER — ACETAMINOPHEN 325 MG/1
975 TABLET ORAL ONCE
Status: COMPLETED | OUTPATIENT
Start: 2024-10-21 | End: 2024-10-21

## 2024-10-21 RX ORDER — NALBUPHINE HYDROCHLORIDE 10 MG/ML
2.5-5 INJECTION INTRAMUSCULAR; INTRAVENOUS; SUBCUTANEOUS EVERY 6 HOURS PRN
Status: DISCONTINUED | OUTPATIENT
Start: 2024-10-21 | End: 2024-10-22

## 2024-10-21 RX ORDER — BUPIVACAINE HYDROCHLORIDE 7.5 MG/ML
INJECTION, SOLUTION INTRASPINAL
Status: DISCONTINUED | OUTPATIENT
Start: 2024-10-21 | End: 2024-10-21

## 2024-10-21 RX ORDER — LOPERAMIDE HYDROCHLORIDE 2 MG/1
4 CAPSULE ORAL
Status: DISCONTINUED | OUTPATIENT
Start: 2024-10-21 | End: 2024-10-23 | Stop reason: HOSPADM

## 2024-10-21 RX ORDER — SIMETHICONE 80 MG
80 TABLET,CHEWABLE ORAL 4 TIMES DAILY PRN
Status: DISCONTINUED | OUTPATIENT
Start: 2024-10-21 | End: 2024-10-23 | Stop reason: HOSPADM

## 2024-10-21 RX ORDER — OXYTOCIN 10 [USP'U]/ML
10 INJECTION, SOLUTION INTRAMUSCULAR; INTRAVENOUS
Status: DISCONTINUED | OUTPATIENT
Start: 2024-10-21 | End: 2024-10-21 | Stop reason: HOSPADM

## 2024-10-21 RX ADMIN — SENNOSIDES AND DOCUSATE SODIUM 1 TABLET: 50; 8.6 TABLET ORAL at 20:34

## 2024-10-21 RX ADMIN — AZITHROMYCIN MONOHYDRATE 500 MG: 500 INJECTION, POWDER, LYOPHILIZED, FOR SOLUTION INTRAVENOUS at 16:17

## 2024-10-21 RX ADMIN — Medication 2 G: at 16:22

## 2024-10-21 RX ADMIN — SODIUM CHLORIDE, SODIUM LACTATE, POTASSIUM CHLORIDE, CALCIUM CHLORIDE AND DEXTROSE MONOHYDRATE: 5; 600; 310; 30; 20 INJECTION, SOLUTION INTRAVENOUS at 20:33

## 2024-10-21 RX ADMIN — ACETAMINOPHEN 325MG 975 MG: 325 TABLET ORAL at 15:04

## 2024-10-21 RX ADMIN — PHENYLEPHRINE HYDROCHLORIDE 0.5 MCG/KG/MIN: 10 INJECTION INTRAVENOUS at 16:26

## 2024-10-21 RX ADMIN — Medication 340 ML/HR: at 16:51

## 2024-10-21 RX ADMIN — ACETAMINOPHEN 975 MG: 325 TABLET ORAL at 15:04

## 2024-10-21 RX ADMIN — BUPIVACAINE HYDROCHLORIDE IN DEXTROSE 1.6 ML: 7.5 INJECTION, SOLUTION SUBARACHNOID at 16:28

## 2024-10-21 RX ADMIN — KETOROLAC TROMETHAMINE 30 MG: 30 INJECTION, SOLUTION INTRAMUSCULAR at 23:28

## 2024-10-21 RX ADMIN — MORPHINE SULFATE 0.15 MG: 1 INJECTION, SOLUTION EPIDURAL; INTRATHECAL; INTRAVENOUS at 16:28

## 2024-10-21 RX ADMIN — ONDANSETRON 4 MG: 2 INJECTION INTRAMUSCULAR; INTRAVENOUS at 16:28

## 2024-10-21 RX ADMIN — SODIUM CITRATE AND CITRIC ACID MONOHYDRATE 30 ML: 500; 334 SOLUTION ORAL at 15:47

## 2024-10-21 RX ADMIN — AZITHROMYCIN MONOHYDRATE 500 MG: 500 INJECTION, POWDER, LYOPHILIZED, FOR SOLUTION INTRAVENOUS at 15:46

## 2024-10-21 RX ADMIN — ACETAMINOPHEN 975 MG: 325 TABLET, FILM COATED ORAL at 20:34

## 2024-10-21 RX ADMIN — SODIUM CHLORIDE, POTASSIUM CHLORIDE, SODIUM LACTATE AND CALCIUM CHLORIDE: 600; 310; 30; 20 INJECTION, SOLUTION INTRAVENOUS at 14:20

## 2024-10-21 RX ADMIN — KETOROLAC TROMETHAMINE 30 MG: 30 INJECTION, SOLUTION INTRAMUSCULAR at 17:26

## 2024-10-21 ASSESSMENT — ACTIVITIES OF DAILY LIVING (ADL)
ADLS_ACUITY_SCORE: 35
ADLS_ACUITY_SCORE: 20

## 2024-10-21 NOTE — PROGRESS NOTES
I was asked to assist in this  delivery for  rupture of membranes at 35 weeks.   My assistance was needed  to aid in a safe delivery for both mom and baby.  My help was required with retraction, visualization, to offer fundal pressure for delivery of the baby, and with abdominal closure.    Ann Albert MD  10/21/24

## 2024-10-21 NOTE — ANESTHESIA POSTPROCEDURE EVALUATION
Patient: Cathy Mace    Procedure: Procedure(s):   section       Anesthesia Type:  Spinal    Note:  Disposition: Inpatient   Postop Pain Control: Uneventful            Sign Out: Well controlled pain   PONV: No   Neuro/Psych: Uneventful            Sign Out: Acceptable/Baseline neuro status (Recovery from neuraxial anesthesia has not occurred but is anticipated within 12 hours. )   Airway/Respiratory: Uneventful            Sign Out: Acceptable/Baseline resp. status   CV/Hemodynamics: Uneventful            Sign Out: Acceptable CV status   Other NRE: NONE   DID A NON-ROUTINE EVENT OCCUR? No           Last vitals:  Vitals Value Taken Time   BP     Temp     Pulse 86 10/21/24 1755   Resp 13 10/21/24 1755   SpO2 97 % 10/21/24 1755   Vitals shown include unfiled device data.    Electronically Signed By: Julio Araiza MD  2024  5:56 PM

## 2024-10-21 NOTE — H&P
"  2024    Cathy Mace  [id number]            OB Admit History & Physical      HPI:   28 yo  at 35 5/7 wks with unstable fetal lie (breech yesterday) presents with gross rupture of membranes.   Leaking of fluid onset at 10:00.   Luis Manuel breech on bedside ultrasound (fetal head at maternal LUQ).     GBS negative yesterday at [facility] (was in [facility] for PTL evaluation).     Irregular contractions noted on monitor, patient is not feeling them.    FHR tracing exhibited one variable (down to 60 bpm) with quick return to baseline, no other variables noted.     Partner + son resent.   Pt diverted from [facility], was receiving care with midwife team.    States last pregnancy her bag of water broke at 36 weeks, she underwent a version procedure, and had a vaginal delivery.   Last solid food prior to midnight, has been drinking water at bedside.    Pregnancy notable for:  1) single umbilical artery  2) hypothyroidism  3) unstable fetal lie  4) hx PPROM at 36 wks prior pregnancy  5) hx postpartum depression (took Lexapro)      Patient's last menstrual period was 2024 (exact date).   Her Estimated Date of Delivery: 2024 making her 35w5d  wks.      Estimated body mass index is 30.38 kg/m  as calculated from the following:    Height as of this encounter: 1.626 m (5' 4\").    Weight as of this encounter: 80.3 kg (177 lb).      Need to review Care Everywhere for prenatal labs       Her OB history:   OB History    Para Term  AB Living   2 1 0 1 0 1   SAB IAB Ectopic Multiple Live Births   0 0 0 0 1      # Outcome Date GA Lbr Raymond/2nd Weight Sex Type Anes PTL Lv   2 Current            1  21 36w4d   M Vag-Spont EPI  CARMEN      Name: Jessica            Past Medical History:   Diagnosis Date    Hypothyroidism (acquired) 2024    Post partum depression 2024    was on Lexapro, weaned from 20 mg to 5 mg and ended     Rectal fissure 2024          Past Surgical " History:   Procedure Laterality Date    APPENDECTOMY  2023         No current outpatient medications on file.       Allergies: Patient has no known allergies.      REVIEW OF SYSTEMS:  NEUROLOGIC:  Negative  EYES:  Negative  ENT:  Negative  GI:  Negative  BREAST:  Negative  :  Negative  GYN:  Negative  CV:  Negative  PULMONARY:  Negative  MUSCULOSKELETAL:  Negative  PSYCH:  Negative        Social History     Socioeconomic History    Marital status:      Spouse name: Braydon    Number of children: 1    Years of education: Not on file    Highest education level: Not on file   Occupational History    Occupation: [employer]   Tobacco Use    Smoking status: Never    Smokeless tobacco: Never   Vaping Use    Vaping status: Not on file   Substance and Sexual Activity    Alcohol use: Never    Drug use: Never    Sexual activity: Yes     Partners: Male   Other Topics Concern    Not on file   Social History Narrative    Not on file     Social Determinants of Health     Financial Resource Strain: Low Risk  (10/21/2024)    Financial Resource Strain     Within the past 12 months, have you or your family members you live with been unable to get utilities (heat, electricity) when it was really needed?: No   Food Insecurity: Low Risk  (10/21/2024)    Food Insecurity     Within the past 12 months, did you worry that your food would run out before you got money to buy more?: No     Within the past 12 months, did the food you bought just not last and you didn t have money to get more?: No   Transportation Needs: Low Risk  (10/21/2024)    Transportation Needs     Within the past 12 months, has lack of transportation kept you from medical appointments, getting your medicines, non-medical meetings or appointments, work, or from getting things that you need?: No   Physical Activity: Not on file   Stress: Not on file   Social Connections: Not on file   Interpersonal Safety: Low Risk  (10/21/2024)    Interpersonal Safety     Do you feel  "physically and emotionally safe where you currently live?: Yes     Within the past 12 months, have you been hit, slapped, kicked or otherwise physically hurt by someone?: No     Within the past 12 months, have you been humiliated or emotionally abused in other ways by your partner or ex-partner?: No   Housing Stability: Low Risk  (10/21/2024)    Housing Stability     Do you have housing? : Yes     Are you worried about losing your housing?: No   Recent Concern: Housing Stability - High Risk (2024)    Housing Stability     Do you have housing? : No     Are you worried about losing your housing?: No      Family History   Problem Relation Age of Onset    No Known Problems Mother     No Known Problems Father     No Known Problems Sister     No Known Problems Brother     No Known Problems Brother     No Known Problems Maternal Grandmother         unknown    No Known Problems Maternal Grandfather         unknown    No Known Problems Paternal Grandmother         unknown    No Known Problems Paternal Grandfather         unknown             Vitals:   /73   Temp 97.3  F (36.3  C) (Temporal)   Resp 16   Ht 1.626 m (5' 4\")   Wt 80.3 kg (177 lb)   LMP 2024 (Exact Date)   BMI 30.38 kg/m      General - appears well  Heart - regular rate  Lungs - breathing unlabored  Abdomen - gravid, NT  Extrem - trace LE edema    FHR - moderate variability, single variable noted  TOCO - irrregular ctx          A:  30 yo  at 35 5/7 wks presents with  rupture of membranes, shady breech presentation  GBS negative  Single umbilical artery  Hypothyroidism  Hx postpartum depression    P:  Recommend  delivery, reviewed reasons for this, pt and partner verbalize understanding.  NPO.  Ancef + Azithromycin.  Continuous EFM.  Reviewed  in detail, including expectations and risks, questions answered.        Ann Albert MD MD  Dept of OB/GYN  2024   "

## 2024-10-21 NOTE — ANESTHESIA PREPROCEDURE EVALUATION
Anesthesia Pre-Procedure Evaluation    Patient: Cathy Mace   MRN: [id number] : 1995        Procedure : Procedure(s):   section          Past Medical History:   Diagnosis Date     Hypothyroidism (acquired) 2024     Post partum depression 2024    was on Lexapro, weaned from 20 mg to 5 mg and ended      Rectal fissure 2024      Past Surgical History:   Procedure Laterality Date     APPENDECTOMY        No Known Allergies   Social History     Tobacco Use     Smoking status: Never     Smokeless tobacco: Never   Substance Use Topics     Alcohol use: Never      Wt Readings from Last 1 Encounters:   10/21/24 80.3 kg (177 lb)        Anesthesia Evaluation            ROS/MED HX  ENT/Pulmonary:    (-) asthma   Neurologic:  - neg neurologic ROS     Cardiovascular:    (-) PIH   METS/Exercise Tolerance:     Hematologic:     (+)  no anticoagulation therapy, no coagulopathy,             Musculoskeletal:       GI/Hepatic:     (+) GERD,                   Renal/Genitourinary:       Endo:     (+)          thyroid problem, hypothyroidism,           Psychiatric/Substance Use:     (+) psychiatric history depression       Infectious Disease:       Malignancy:       Other:   Breech presentation         Physical Exam    Airway        Mallampati: II   TM distance: > 3 FB   Neck ROM: full   Mouth opening: > 3 cm    Respiratory Devices and Support         Dental  no notable dental history     (+) Minor Abnormalities - some fillings, tiny chips      Cardiovascular   cardiovascular exam normal          Pulmonary   pulmonary exam normal            OUTSIDE LABS:  CBC:   Lab Results   Component Value Date    WBC 8.7 10/21/2024    WBC 7.5 2024    HGB 12.2 10/21/2024    HGB 12.1 2024    HCT 37.1 10/21/2024    HCT 35.8 2024     10/21/2024     2024     BMP:   Lab Results   Component Value Date     (L) 2024     (L) 2024    POTASSIUM 4.8 2024     "POTASSIUM 5.2 05/13/2024    CHLORIDE 102 07/13/2024    CHLORIDE 101 05/13/2024    CO2 20 (L) 07/13/2024    CO2 22 05/13/2024    BUN 2.8 (L) 07/13/2024    BUN 5.3 (L) 05/13/2024    CR 0.42 (L) 07/13/2024    CR 0.44 (L) 05/13/2024    GLC 77 07/13/2024    GLC 84 05/13/2024     COAGS: No results found for: \"PTT\", \"INR\", \"FIBR\"  POC: No results found for: \"BGM\", \"HCG\", \"HCGS\"  HEPATIC:   Lab Results   Component Value Date    ALBUMIN 3.6 07/13/2024    PROTTOTAL 6.8 07/13/2024    ALT 15 07/13/2024    AST 52 (H) 07/13/2024    ALKPHOS 73 07/13/2024    BILITOTAL 0.2 07/13/2024     OTHER:   Lab Results   Component Value Date    A1C 5.6 04/25/2024    DESIREE 8.7 07/13/2024    PHOS 3.4 03/29/2024    MAG 2.2 03/29/2024    LIPASE 40 05/03/2024    TSH 2.21 10/07/2024    T4 1.49 05/24/2024    SED 46 (H) 05/13/2024       Anesthesia Plan    ASA Status:  2    NPO Status:  NPO Appropriate    Anesthesia Type: Spinal.              Consents    Anesthesia Plan(s) and associated risks, benefits, and realistic alternatives discussed. Questions answered and patient/representative(s) expressed understanding.     - Discussed:     - Discussed with:  Patient            Postoperative Care    Pain management: Multi-modal analgesia.   PONV prophylaxis: Ondansetron (or other 5HT-3)     Comments:               Dimitrios Ray, DO, DO    I have reviewed the pertinent notes and labs in the chart from the past 30 days and (re)examined the patient.  Any updates or changes from those notes are reflected in this note.                                 "

## 2024-10-21 NOTE — PLAN OF CARE
at 35w5d admitted to [facility] 3 for rule out rupture.  She was seen at [facility] for a  labor eval yesterday and sees the [facility] there.  She has a history of  delivery at 36+4 with her son.  Single umbilical artery and hypothyroid on synthroid.  At her last exam she was found to be breech.  She states she woke up this morning and had a large gush of clear fluid that has continued leaking since.  Placed on monitors with verbal consent and oriented to room and plan of care.  All admission questions completed.  ROM plus collected due to gestational age.  Dr Marcelo cowan.

## 2024-10-21 NOTE — TELEPHONE ENCOUNTER
Caller reporting the following red-flag symptom(s): Pt is 35w5d and her water just broke    Per the system red-flag symptom policy, patient was instructed to:  speak with a Registered Nurse    Action:  Patient warm transferred to a Registered Nurse    pulse oximetry

## 2024-10-21 NOTE — ANESTHESIA PROCEDURE NOTES
"Intrathecal Procedure Note    Pre-Procedure   Staff -        Anesthesiologist:  Julio Araiza MD       Performed By: anesthesiologist       Location: OR       Pre-Anesthestic Checklist: patient identified, IV checked, site marked, risks and benefits discussed, informed consent, monitors and equipment checked, pre-op evaluation, at physician/surgeon's request and post-op pain management  Timeout:       Correct Patient: Yes        Correct Procedure: Yes        Correct Site: Yes        Correct Position: Yes   Procedure Documentation  Procedure: intrathecal       Patient Position: sitting       Patient Prep/Sterile Barriers: sterile gloves, mask, patient draped       Skin prep: Chloraprep       Insertion Site: L4-5. (midline approach).       Spinal Needle Type: Ned tip       Introducer used       Introducer: 20 G       # of attempts: 1 and  # of redirects:  0    Assessment/Narrative         Paresthesias: No.       CSF fluid: clear.    Medication(s) Administered   0.75% Hyperbaric Bupivacaine (Intrathecal) - Intrathecal   1.6 mL - 10/21/2024 4:28:00 PM  Morphine PF 1 mg/mL (Intrathecal) - Intrathecal   0.15 mg - 10/21/2024 4:28:00 PM   Comments:  Patient tolerated well.  Pre and post aspiration.  No complications.  0.75% Bupivacaine and Duramorph documented in record.       FOR [facility] ([facility]) ONLY:   Pain Team Contact information: please page the Pain Team Via [employer]. Search \"Pain\". During daytime hours, please page the attending first. At night please page the resident first.      "

## 2024-10-21 NOTE — L&D DELIVERY NOTE
[facility],   Section Operative Note    Indications: Intrauterine pregnancy at 35 5/7 weeks gestation  Malpresentation, shady breech presentation  SROM, early labor  Gestational age: 35w5d    Pre-operative Diagnosis: Single delivery by  section [O82]  Post-operative Diagnosis: Same    Primary Surgeon: Paulina Robertson MD  Assistant Surgeon: Ann Albert MD    Procedure Details:  The patient was seen in the Holding Room. The risks, benefits, complications, treatment options, and expected outcomes were discussed with the patient.  The patient concurred with the proposed plan, giving informed consent.  The site of surgery properly noted/marked. The patient was taken to Operating Room, identified as Cathy Mace and the procedure verified as  Delivery. A Time Out was held and the above information confirmed.    After induction of anesthesia, the patient was draped and prepped in the usual sterile manner. A Pfannenstiel incision was made and carried down through the subcutaneous tissue to the fascia. Fascial incision was made and extended transversely. The fascia was  from the underlying rectus tissue superiorly and inferiorly. The peritoneum was identified and entered. Peritoneal incision was extended longitudinally. The utero-vesical peritoneal reflection was incised transversely and the bladder flap was bluntly freed from the lower uterine segment. A low transverse uterine incision was made. Delivered from shady breech presentation was a  female infant, weight pending,  with Apgar scores of 8 at one minute and 9 at five minutes. After the umbilical cord was clamped and cut cord blood was obtained for evaluation. One loose true knot was noted in umbilical cord. 2 vessel cord was noted. The placenta was removed intact and appeared normal. The uterine outline, tubes and ovaries appeared normal. The uterine incision was closed with running locked sutures of 0  Chromic. A second imbricating layer was placed with ) chromic for hemostasis. Hemostasis was observed. Lavage was carried out until clear. The fascia was then reapproximated with running sutures of 0 Vicryl. The subcutaneous tissue was reapproximated with running 2.0 plain suture.The skin was reapproximated in subcuticular fashion with 4.0 vicryl.    Instrument, sponge, and needle counts were correct prior the abdominal closure and at the conclusion of the case.     Findings:  Viable female infant in shady breech presentation    Estimated Blood Loss:  * No blood loss amount entered *     Total IV Fluids: (See anesthesia record)  800ml     Drains: None        Specimens: None           Implants: None           Complications:  None           Disposition: To PACU           Condition: Stable    Attending Attestation: I, Paulina oRbertson MD, MD, personally performed the above procedure on this patient.

## 2024-10-21 NOTE — PROGRESS NOTES
OB    Patient comfortable, not feeling contractions.  She presented earlier today as divert from [facility]. [facility].  Prenatal care significant for history of previous  birth. This pregnancy notable for single umbilical artery with reassuring fetal testing.   Noted gush of fluid around 10 am.   Patient stating that baby has been vtx and breech this pregnancy.  Was seen recently at [facility] yesterday for PTL evaluation and noted to be breech.  ROM+ is positive here and patient is continuing to leak fluid.   She is having irregular contractions that she is not noting as painful.   Bedside ultrasound done earlier today by Dr. Albert confirms shady breech presentation.  Patient has not had anything to eat since last night.     A/P: 28 yo  @ 35 5/7 weeks, PROM, shady breech presentation, GBS-  Patient has been advised that it is generally not recommended to attempt ECV after ROM given increased risk for possible cord prolapse, fetal intolerance to procedure. Advised proceeding with primary c/s now for breech presentation, SROM, early labor.   Her questions are answered.  Consent form is signed.  Will proceed now with primary c/s.   Will have NNP present at time of delivery.  Abx for c/s and ROM prophylaxis.   Paulina Robertson MD, MD on 10/21/2024 at 4:12 PM

## 2024-10-21 NOTE — ANESTHESIA CARE TRANSFER NOTE
Patient: Cathy Mace    Procedure: Procedure(s):   section       Diagnosis: Single delivery by  section [O82]  Diagnosis Additional Information: No value filed.    Anesthesia Type:   Spinal     Note:    Oropharynx: oropharynx clear of all foreign objects  Level of Consciousness: awake  Oxygen Supplementation: room air    Independent Airway: airway patency satisfactory and stable  Dentition: dentition unchanged  Vital Signs Stable: post-procedure vital signs reviewed and stable  Report to RN Given: handoff report given  Patient transferred to: PACU    Handoff Report: Identifed the Patient, Identified the Reponsible Provider, Reviewed the pertinent medical history, Discussed the surgical course, Reviewed Intra-OP anesthesia mangement and issues during anesthesia, Set expectations for post-procedure period and Allowed opportunity for questions and acknowledgement of understanding      Vitals:  Vitals Value Taken Time   /78    Temp     Pulse 87    Resp     SpO2 97        Electronically Signed By: RADHA Benson CRNA  2024  5:45 PM

## 2024-10-21 NOTE — PROGRESS NOTES
Initially I signed orders for triage evaluation for Cathy Mace. After noting she was not our client, I called [facility] triage to let them know she needs to be assigned to the unassigned group since there was no CNM to CNM transfer.     Richie Barragan, DNP, APRN, CNM

## 2024-10-21 NOTE — TELEPHONE ENCOUNTER
35w5d. . Spoke with Cathy. SROM before waking up- fluid just keeps coming out. Denies bleeding, contractions. +FM. Called L&D- stated patient needs to go elsewhere as they cannot accomidate patient (on yellow statis). Relayed information to patient. They will go to [facility].

## 2024-10-22 LAB — HGB BLD-MCNC: 10.5 G/DL (ref 11.7–15.7)

## 2024-10-22 PROCEDURE — 85018 HEMOGLOBIN: CPT | Performed by: OBSTETRICS & GYNECOLOGY

## 2024-10-22 PROCEDURE — 36415 COLL VENOUS BLD VENIPUNCTURE: CPT | Performed by: OBSTETRICS & GYNECOLOGY

## 2024-10-22 PROCEDURE — 250N000011 HC RX IP 250 OP 636: Performed by: OBSTETRICS & GYNECOLOGY

## 2024-10-22 PROCEDURE — 120N000012 HC R&B POSTPARTUM

## 2024-10-22 PROCEDURE — 250N000011 HC RX IP 250 OP 636: Performed by: ANESTHESIOLOGY

## 2024-10-22 PROCEDURE — 250N000013 HC RX MED GY IP 250 OP 250 PS 637: Performed by: OBSTETRICS & GYNECOLOGY

## 2024-10-22 RX ADMIN — IBUPROFEN 800 MG: 400 TABLET, FILM COATED ORAL at 18:02

## 2024-10-22 RX ADMIN — SENNOSIDES AND DOCUSATE SODIUM 1 TABLET: 50; 8.6 TABLET ORAL at 08:14

## 2024-10-22 RX ADMIN — KETOROLAC TROMETHAMINE 30 MG: 30 INJECTION, SOLUTION INTRAMUSCULAR at 11:22

## 2024-10-22 RX ADMIN — OXYCODONE HYDROCHLORIDE 5 MG: 5 TABLET ORAL at 20:01

## 2024-10-22 RX ADMIN — NALBUPHINE HYDROCHLORIDE 5 MG: 10 INJECTION, SOLUTION INTRAMUSCULAR; INTRAVENOUS; SUBCUTANEOUS at 06:28

## 2024-10-22 RX ADMIN — ACETAMINOPHEN 975 MG: 325 TABLET, FILM COATED ORAL at 03:09

## 2024-10-22 RX ADMIN — ACETAMINOPHEN 975 MG: 325 TABLET, FILM COATED ORAL at 14:26

## 2024-10-22 RX ADMIN — KETOROLAC TROMETHAMINE 30 MG: 30 INJECTION, SOLUTION INTRAMUSCULAR at 05:37

## 2024-10-22 RX ADMIN — SENNOSIDES AND DOCUSATE SODIUM 1 TABLET: 50; 8.6 TABLET ORAL at 20:01

## 2024-10-22 RX ADMIN — ACETAMINOPHEN 975 MG: 325 TABLET, FILM COATED ORAL at 08:34

## 2024-10-22 RX ADMIN — NALBUPHINE HYDROCHLORIDE 2.5 MG: 10 INJECTION, SOLUTION INTRAMUSCULAR; INTRAVENOUS; SUBCUTANEOUS at 00:23

## 2024-10-22 RX ADMIN — ONDANSETRON 4 MG: 2 INJECTION INTRAMUSCULAR; INTRAVENOUS at 03:46

## 2024-10-22 ASSESSMENT — ACTIVITIES OF DAILY LIVING (ADL)
ADLS_ACUITY_SCORE: 22
ADLS_ACUITY_SCORE: 20
ADLS_ACUITY_SCORE: 23
ADLS_ACUITY_SCORE: 28
ADLS_ACUITY_SCORE: 26
ADLS_ACUITY_SCORE: 23
ADLS_ACUITY_SCORE: 26
ADLS_ACUITY_SCORE: 23
ADLS_ACUITY_SCORE: 28
ADLS_ACUITY_SCORE: 23
ADLS_ACUITY_SCORE: 26
ADLS_ACUITY_SCORE: 28
ADLS_ACUITY_SCORE: 26
ADLS_ACUITY_SCORE: 26
ADLS_ACUITY_SCORE: 22
ADLS_ACUITY_SCORE: 28
ADLS_ACUITY_SCORE: 23
ADLS_ACUITY_SCORE: 23
ADLS_ACUITY_SCORE: 28
ADLS_ACUITY_SCORE: 23
ADLS_ACUITY_SCORE: 22
ADLS_ACUITY_SCORE: 23
ADLS_ACUITY_SCORE: 23

## 2024-10-22 NOTE — PLAN OF CARE
Goal Outcome Evaluation:      Plan of Care Reviewed With: patient    Overall Patient Progress: improving  Pt's pain controlled with Duramorph/Toradol/Tylenol. Up and about in room and walking in the hallways. Abdominal binder on. Pumping started after feedings. Voiding good amounts without difficulty. Will continue to monitor.

## 2024-10-22 NOTE — PROGRESS NOTES
[facility] Obstetrics Post-Op / Progress Note         Assessment and Plan:    Assessment:   Post-operative day #1  Low transverse primary  section  L&D complications: Intrauterine pregnancy at 35 weeks gestation  Breech presentation  SROM      Doing well.  Clean wound without signs of infection.  No immediate surgical complications identified.  No excessive bleeding  Pain well-controlled.      Plan:   Ambulation encouraged  Pain control measures as needed  Anticipate discharge in 1-2 days           Interval History:   Doing well.  Pain is well-controlled.  No fevers.  No history of wound drainage, warmth or significant erythema.  Good appetite.  Denies chest pain, shortness of breath, nausea or vomiting.  Ambulatory.  Breastfeeding well.          Significant Problems:    None          Review of Systems:    The patient denies any chest pain, shortness of breath, excessive pain, fever, chills, purulent drainage from the wound, nausea or vomiting.          Medications:   All medications related to the patient's surgery have been reviewed          Physical Exam:   All vitals stable  Patient Vitals for the past 12 hrs:   BP Temp Temp src Pulse Resp SpO2   10/22/24 0819 119/73 97.6  F (36.4  C) Oral 61 16 98 %   10/22/24 0538 111/70 -- -- 62 -- --   10/22/24 0348 128/74 97.9  F (36.6  C) Oral 59 16 98 %   10/22/24 0131 107/56 -- -- 76 16 98 %   10/22/24 0027 104/56 -- -- 67 16 98 %   10/21/24 2253 126/71 -- -- -- 16 99 %     Wound clean and dry with minimal or no drainage.  Surrounding skin with minimal erythema.          Data:   All laboratory data related to this surgery reviewed  Hemoglobin   Date Value Ref Range Status   10/22/2024 10.5 (L) 11.7 - 15.7 g/dL Final   10/21/2024 12.2 11.7 - 15.7 g/dL Final   2024 12.1 11.7 - 15.7 g/dL Final   2024 12.1 11.7 - 15.7 g/dL Final   2024 13.6 11.7 - 15.7 g/dL Final     No imaging studies have been ordered    Masood Rosales MD

## 2024-10-22 NOTE — PROGRESS NOTES
Data: Cathy Mace transferred to 404 via cart at 2000. Baby transferred via parent's arms.  Action: Receiving unit notified of transfer: Yes. Patient and family notified of room change. Report given to Cornelius HOWELL RN at 2000. Belongings sent to receiving unit. Accompanied by Registered Nurse. Oriented patient to surroundings. Call light within reach. ID bands double-checked with receiving RN.  Response: Patient tolerated transfer and is stable.

## 2024-10-22 NOTE — PROGRESS NOTES
Pt. admitted from L&D via bed. Pt. arrived with baby in arms. Report was taken from GENA Negron RN in L&D.  Pt. is A&Ox3 and VSS on RA. Fundus is firm and midline.  Vaginal bleeding is scant. Pt. denied  nausea, CP, SOB LS clear and BS audible. PIV patent and infusing.  Garcia patent and draining.  Dressing to lower abdomen CDI.  Pneumoboots in place to BLE.  Pt. oriented to the room and call light system.

## 2024-10-22 NOTE — PROVIDER NOTIFICATION
10/21/24 2223   Provider Notification   Provider Name/Title Dr. Robertson   Method of Notification Electronic Page   Request Evaluate-Remote   Notification Reason Medication Request     Provider paged to clarify toradol order. Ordered to start 10/23 at 0000, per Dr. Marcelo bland to modify order and start toradol at 2330 tonight (10/21).

## 2024-10-22 NOTE — PLAN OF CARE
Goal Outcome Evaluation:      Plan of Care Reviewed With: patient    Overall Patient Progress: improvingOverall Patient Progress: improving     VSS. Incision C/D/I- with scant drainage unchanged this shift. Up to bathroom with assist, tolerated well. Garcia catheter removed at 0600- due to void. Pain controlled with tylenol and toradol, cold pack applied for incisional discomfort. FF/-1, scant bleeding. Nubain administered for itching with some relief. Working on breastfeeding . Continue with plan of care and notify provider as needed.

## 2024-10-23 VITALS
HEIGHT: 64 IN | RESPIRATION RATE: 18 BRPM | BODY MASS INDEX: 30.27 KG/M2 | SYSTOLIC BLOOD PRESSURE: 103 MMHG | HEART RATE: 87 BPM | WEIGHT: 177.3 LBS | DIASTOLIC BLOOD PRESSURE: 78 MMHG | TEMPERATURE: 97.3 F | OXYGEN SATURATION: 98 %

## 2024-10-23 PROCEDURE — 250N000013 HC RX MED GY IP 250 OP 250 PS 637: Performed by: OBSTETRICS & GYNECOLOGY

## 2024-10-23 RX ORDER — OXYCODONE HYDROCHLORIDE 5 MG/1
5 TABLET ORAL EVERY 6 HOURS PRN
Qty: 20 TABLET | Refills: 0 | Status: SHIPPED | OUTPATIENT
Start: 2024-10-23 | End: 2024-11-04

## 2024-10-23 RX ORDER — ACETAMINOPHEN 325 MG/1
975 TABLET ORAL EVERY 6 HOURS PRN
Qty: 100 TABLET | Refills: 0 | Status: SHIPPED | OUTPATIENT
Start: 2024-10-23

## 2024-10-23 RX ORDER — IBUPROFEN 800 MG/1
800 TABLET, FILM COATED ORAL EVERY 6 HOURS PRN
Qty: 40 TABLET | Refills: 1 | Status: SHIPPED | OUTPATIENT
Start: 2024-10-23

## 2024-10-23 RX ADMIN — SENNOSIDES AND DOCUSATE SODIUM 2 TABLET: 50; 8.6 TABLET ORAL at 08:32

## 2024-10-23 RX ADMIN — IBUPROFEN 800 MG: 400 TABLET, FILM COATED ORAL at 06:18

## 2024-10-23 RX ADMIN — ACETAMINOPHEN 975 MG: 325 TABLET, FILM COATED ORAL at 03:43

## 2024-10-23 RX ADMIN — OXYCODONE HYDROCHLORIDE 5 MG: 5 TABLET ORAL at 00:22

## 2024-10-23 RX ADMIN — OXYCODONE HYDROCHLORIDE 5 MG: 5 TABLET ORAL at 08:32

## 2024-10-23 RX ADMIN — ACETAMINOPHEN 975 MG: 325 TABLET, FILM COATED ORAL at 10:17

## 2024-10-23 RX ADMIN — IBUPROFEN 800 MG: 400 TABLET, FILM COATED ORAL at 00:00

## 2024-10-23 ASSESSMENT — ACTIVITIES OF DAILY LIVING (ADL)
ADLS_ACUITY_SCORE: 0
ADLS_ACUITY_SCORE: 23
ADLS_ACUITY_SCORE: 0

## 2024-10-23 NOTE — LACTATION NOTE
"This note was copied from a baby's chart.  Lactation visit with Cathy and baby girl.    Cathy shares infant has been breastfeeding well. She also shares her first child was born early (LPT) and needed extra help with breastfeeding in the beginning. She is excited that this infant has been breastfeeding \"well\". Infant eager to nurse and latches/suckles well. Cathy states she is limiting her to 15 minutes of breastfeeding and then offering supplementation of formula (20 ml) via bottle after breastfeeding. Cathy pumping occasionally, she was alone with infant overnight and was too tired to pump. She plans to do more pumping once she gets home. Encouraged her to pump after breast feeding as long as infant is taking a bottle after breastfeeding to build maternal milk supply to infant's demands.     Discharge feeding plan recommendations: provide unlimited, on-demand breast/bottle feedings: At least 8-12 times/24 hours (reviewed early feeding cues). Encouraged on-going use of a feeding log or rafael to record feedings along with void/stool patterns.    Follow up with Pediatrician as requested and encouraged lactation follow up.  Appreciative of visit.    Camila Kemp RN, IBCLC          "

## 2024-10-23 NOTE — PROGRESS NOTES
"POST PARTUM NOTE,  SECTION    POST-OP DAY 2:  Delivery at 35 5/7 wks for PPROM, breech    Cathy feels well.   Requests to go home today.     Infant in room, nursing.   Some incision discomfort, taking oxycodone, tylenol, ibuprofen.  +flatus, + small BM.    Ambulating.  Denies heavy bleeding.     Declines script for colace, states already has regimen at home to encourage regular BMs due to hx fissures.    PE  /80 (BP Location: Right arm)   Pulse 84   Temp 97.7  F (36.5  C) (Oral)   Resp 18   Ht 1.626 m (5' 4\")   Wt 80.3 kg (177 lb)   LMP 2024 (Exact Date)   SpO2 98%   Breastfeeding Unknown   BMI 30.38 kg/m  ,      Intake/Output Summary (Last 24 hours) at 10/23/2024 0714  Last data filed at 10/22/2024 1500  Gross per 24 hour   Intake 1800 ml   Output 2000 ml   Net -200 ml       General - appears well  Incision - c/d/I with steri strips  Abdomen - soft, NT, ND, fundus firm below umbilicus  Lower Extremities - trace LE edema      Hemoglobin   Date Value Ref Range Status   10/22/2024 10.5 (L) 11.7 - 15.7 g/dL Final         Impression:   POD #2 s/p primary CS  for  ROM at 35 5/7 wks, breech presentation.  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  Plan 2 + 6 week visits at usual clinic at [facility]   (discussed MD visit for incision check in 2 wks).      Ann Albert MD  [facility]  10/23/24    "

## 2024-10-23 NOTE — LACTATION NOTE
Initial visit with Mother and baby girl.  Baby girl is LPT at 35+5 weeks of gestation.  She appears to be breast feeding well but Mother is also supplementing baby with formula by tube/syringe at the breast.  Baby girl is taking about 10-15 mls and tolerates feedings well.  Mother is also pumping.  Breast feeding general information reviewed.    Encouraged Mother to call for assistance with latch or positioning if needed.  Appreciative of visit.  No further questions at this time. Will follow as needed.   Maci Brown RN, IBCLC

## 2024-10-23 NOTE — DISCHARGE SUMMARY
[facility] Discharge Summary    Cathy Mace MRN# [id number]   Age: 29 year old YOB: 1995     Date of Admission:  10/21/2024  Date of Discharge::  10/23/2024  Admitting Physician:  Paulina Robertson MD  Discharge Physician:  Ann Albert MD     Home clinic: [facility] - midwives          Admission Diagnoses:   35 5/7 weeks   rupture of membranes  Breech presentation  Single umbilical artery  hypothyroidism          Discharge Diagnosis:     Same, s/p primary           Procedures:     Procedure(s): Primary                 Medications Prior to Admission:     Medications Prior to Admission   Medication Sig Dispense Refill Last Dose/Taking    famotidine (PEPCID) 20 MG tablet Take 20 mg by mouth 2 times daily   10/20/2024    levothyroxine (SYNTHROID/LEVOTHROID) 75 MCG tablet Take 1 tablet (75 mcg) by mouth daily 90 tablet 0 10/20/2024    Prenatal Vit-Fe Fumarate-FA (PRENATAL PLUS) 27-1 MG TABS Take 1 tablet by mouth daily 90 tablet 2 10/20/2024    [DISCONTINUED] acetaminophen (TYLENOL) 325 MG tablet Take 325-650 mg by mouth every 6 hours as needed for mild pain   10/20/2024    [DISCONTINUED] hydrOXYzine HCl (ATARAX) 25 MG tablet Take 1-2 tablets (25-50 mg) by mouth 2 times daily as needed for itching, anxiety or other (symphysis pubis pain). 30 tablet 0     [DISCONTINUED] metoclopramide (REGLAN) 5 MG tablet Take 2 tablets (10 mg) by mouth 4 times daily as needed (nasuea/vomiting in pregnancy). 90 tablet 1 More than a month             Discharge Medications:     Current Discharge Medication List        START taking these medications    Details   ibuprofen (ADVIL/MOTRIN) 800 MG tablet Take 1 tablet (800 mg) by mouth every 6 hours as needed for moderate pain.  Qty: 40 tablet, Refills: 1    Associated Diagnoses: Single delivery by  section      oxyCODONE (ROXICODONE) 5 MG tablet Take 1 tablet (5 mg) by mouth every 6 hours as needed for severe pain.  Qty: 20  tablet, Refills: 0    Associated Diagnoses: Single delivery by  section           CONTINUE these medications which have CHANGED    Details   acetaminophen (TYLENOL) 325 MG tablet Take 3 tablets (975 mg) by mouth every 6 hours as needed for mild pain.  Qty: 100 tablet, Refills: 0    Associated Diagnoses: Single delivery by  section           CONTINUE these medications which have NOT CHANGED    Details   famotidine (PEPCID) 20 MG tablet Take 20 mg by mouth 2 times daily      levothyroxine (SYNTHROID/LEVOTHROID) 75 MCG tablet Take 1 tablet (75 mcg) by mouth daily  Qty: 90 tablet, Refills: 0    Associated Diagnoses: Other specified hypothyroidism      Prenatal Vit-Fe Fumarate-FA (PRENATAL PLUS) 27-1 MG TABS Take 1 tablet by mouth daily  Qty: 90 tablet, Refills: 2    Associated Diagnoses: Encounter for supervision of other normal pregnancy in first trimester           STOP taking these medications       hydrOXYzine HCl (ATARAX) 25 MG tablet Comments:   Reason for Stopping:         metoclopramide (REGLAN) 5 MG tablet Comments:   Reason for Stopping:                     Consultations:   No consultations were requested during this admission            Hospital Course:   Presented with  ROM at 35 5/7 wks, contractions, breech presentation.    S/p uncomplicated .    Normal postop course.  Infant is well.  Breastfeeding.  Pt requested discharge today, on POD #2.      Post-partum hemoglobin:   Hemoglobin   Date Value Ref Range Status   10/22/2024 10.5 (L) 11.7 - 15.7 g/dL Final             Discharge Instructions and Follow-Up:     Discharge diet: Regular   Discharge activity: No lifting or strenuous exercise for 6 week(s)  Pelvic rest: abstain from intercourse and do not use tampons for 6 week(s)   Discharge follow-up: Follow up with your usual OB clinic  in 2 and 6 weeks  (please see physician for the 2 week incision check)   Wound care: Keep wound clean and dry           Discharge Disposition:      Discharged to home      Attestation:  I have reviewed today's vital signs, notes, medications, labs and imaging.    Ann Albert MD   10/23/24

## 2024-10-23 NOTE — PLAN OF CARE
Vital signs stable. Working on breastfeeding every 2-3 hours or on demand with minimal assist. Supplementing with bottle and similac after. Fundus firm and midline with scant flow. Incision well approximated. Pain well controlled with tylenol and ibuprofen. Patient up independent in the room and ambulation encouraged in hallways. Voiding spontaneously without difficulty. Education given and questions answered. Will continue with the current plan of care.                          No

## 2024-10-23 NOTE — PLAN OF CARE
Goal Outcome Evaluation:      Plan of Care Reviewed With: patient    Overall Patient Progress: improvingOverall Patient Progress: improving         D: VSS, assessments WDL.  I: Pt received complete discharge paperwork and home medications as filled by discharge pharmacy -tylenol, ibuprofen and oxycodone. Pt was given times of last dose for all discharge medications in writing on discharge medication sheets. Discharge teaching included home medication, pain management activity restrictions, postpartum cares and symptoms of infection.   A: Discharge outcomes on care plan met. Mother states understanding and comfort with self and baby cares.  P: Pt discharged to home. Pt was discharged with baby, and bands checked at time of discharge. Pt was accompanied by , nurse and baby, and left with personal belongings. Pt to follow up with OB per MD order. Pt had no further questions at this time and no unmet needs were identified.

## 2024-10-23 NOTE — DISCHARGE INSTRUCTIONS
Warning Signs after Having a Baby    Keep this paper on your fridge or somewhere else where you can see it.    Call your provider if you have any of these symptoms up to 12 weeks after having your baby.    Thoughts of hurting yourself or your baby  Pain in your chest or trouble breathing  Severe headache not helped by pain medicine  Eyesight concerns (blurry vision, seeing spots or flashes of light, other changes to eyesight)  Fainting, shaking or other signs of a seizure    Call 9-1-1 if you feel that it is an emergency.     The symptoms below can happen to anyone after giving birth. They can be very serious. Call your provider if you have any of these warning signs.    My provider s phone number: _______________________    Losing too much blood (hemorrhage)    Call your provider if you soak through a pad in less than an hour or pass blood clots bigger than a golf ball. These may be signs that you are bleeding too much.    Blood clots in the legs or lungs    After you give birth, your body naturally clots its blood to help prevent blood loss. Sometimes this increased clotting can happen in other areas of the body, like the legs or lungs. This can block your blood flow and be very dangerous.     Call your provider if you:  Have a red, swollen spot on the back of your leg that is warm or painful when you touch it.   Are coughing up blood.     Infection    Call your provider if you have any of these symptoms:  Fever of 100.4 F (38 C) or higher.  Pain or redness around your stitches if you had an incision.   Any yellow, white, or green fluid coming from places where you had stitches or surgery.    Mood Problems (postpartum depression)    Many people feel sad or have mood changes after having a baby. But for some people, these mood swings are worse.     Call your provider right away if you feel so anxious or nervous that you can't care for yourself or your baby.    Preeclampsia (high blood pressure)    Even if you  didn't have high blood pressure when you were pregnant, you are at risk for the high blood pressure disease called preeclampsia. This risk can last up to 12 weeks after giving birth.     Call your provider if you have:   Pain on your right side under your rib cage  Sudden swelling in the hands and face    Remember: You know your body. If something doesn't feel right, get medical help.     For informational purposes only. Not to replace the advice of your health care provider. Copyright 2020 [facility]. All rights reserved. Clinically reviewed by Sandra Wood, RNC-OB, MSN. mascotsecret [id number] - Rev 02/23.

## 2024-10-28 ENCOUNTER — TELEPHONE (OUTPATIENT)
Dept: OBGYN | Facility: CLINIC | Age: 29
End: 2024-10-28
Payer: COMMERCIAL

## 2024-10-28 NOTE — TELEPHONE ENCOUNTER
LVM for patient to schedule 2 and 6 week post partums with and Resident or attending physician.

## 2024-11-01 ENCOUNTER — TELEPHONE (OUTPATIENT)
Dept: OBGYN | Facility: CLINIC | Age: 29
End: 2024-11-01
Payer: COMMERCIAL

## 2024-11-01 NOTE — TELEPHONE ENCOUNTER
Spoke with Cathy. She went off antidepressants and antianxiety medications when she got pregnant. She is feeling very anxious and depressed now that she has delivered and wants to get back on them, states she has PPD. Made patient appt on 11/1/24 with Dr. Burnette at 8:15 am. Will send PHQ 9 and DAVID 7.

## 2024-11-03 ASSESSMENT — EDINBURGH POSTNATAL DEPRESSION SCALE (EPDS)
I HAVE BLAMED MYSELF UNNECESSARILY WHEN THINGS WENT WRONG: NOT VERY OFTEN
I HAVE FELT SCARED OR PANICKY FOR NO GOOD REASON: NO, NOT AT ALL
I HAVE BEEN SO UNHAPPY THAT I HAVE BEEN CRYING: ONLY OCCASIONALLY
I HAVE BEEN ANXIOUS OR WORRIED FOR NO GOOD REASON: YES, SOMETIMES
THE THOUGHT OF HARMING MYSELF HAS OCCURRED TO ME: NEVER
I HAVE LOOKED FORWARD WITH ENJOYMENT TO THINGS: AS MUCH AS I EVER DID
I HAVE BEEN ABLE TO LAUGH AND SEE THE FUNNY SIDE OF THINGS: AS MUCH AS I ALWAYS COULD
I HAVE BEEN SO UNHAPPY THAT I HAVE HAD DIFFICULTY SLEEPING: NOT VERY OFTEN
THINGS HAVE BEEN GETTING ON TOP OF ME: NO, I HAVE BEEN COPING AS WELL AS EVER
TOTAL SCORE: 6
I HAVE FELT SAD OR MISERABLE: NOT VERY OFTEN

## 2024-11-04 ENCOUNTER — VIRTUAL VISIT (OUTPATIENT)
Dept: OBGYN | Facility: CLINIC | Age: 29
End: 2024-11-04
Attending: STUDENT IN AN ORGANIZED HEALTH CARE EDUCATION/TRAINING PROGRAM
Payer: COMMERCIAL

## 2024-11-04 PROCEDURE — 99441 PR PHYSICIAN TELEPHONE EVALUATION 5-10 MIN: CPT | Mod: 93 | Performed by: STUDENT IN AN ORGANIZED HEALTH CARE EDUCATION/TRAINING PROGRAM

## 2024-11-04 RX ORDER — CITALOPRAM HYDROBROMIDE 10 MG/1
10 TABLET ORAL DAILY
Qty: 30 TABLET | Refills: 0 | Status: SHIPPED | OUTPATIENT
Start: 2024-11-04 | End: 2024-12-04

## 2024-11-04 ASSESSMENT — PAIN SCALES - GENERAL: PAINLEVEL_OUTOF10: NO PAIN (0)

## 2024-11-04 NOTE — LETTER
2024       RE: Cathy Mace  [address]  [place] [address]     Dear Colleague,    Thank you for referring your patient, Cathy Mace, to the [facility] [place] at [facility]. Please see a copy of my visit note below.    Postpartum Visit Note    S:  Cathy Mace is a 29 year old  via telephone for mood check.     Pain is overall well managed. Bleeding is normal. Voiding and having normal bowel movements.     She states mood is not that good. She is not sleeping well with a new baby and a toddler. She does not necessarily feel down, but she does not have motivation to do any kind of work. She reports she is angry often, which is a change from her baseline. She denies suicidal or homicidal ideation. Denies intrusive thoughts. Denies seeing or hearing things that her  does not hear. She has been on citalopram in the past and this was effective.       Delivery Date: 10/21/24.    Delivering provider:  Paulina Robertson MD at [facility]  Type of delivery:  CS for breech presentation, PPROM at 35w5d     ROS: 10 point ROS neg other than the symptoms noted above in the HPI.     O:    Past Medical History:   Diagnosis Date     Hypothyroidism (acquired) 2024     Post partum depression 2024    was on Lexapro, weaned from 20 mg to 5 mg and ended      Rectal fissure 2024     Past Surgical History:   Procedure Laterality Date     APPENDECTOMY        SECTION N/A 10/21/2024    Procedure:  section;  Surgeon: Paulina Robertson MD;  Location:  L+D     Assessment and Plan:   Cathy Mace is a 29 year old  s/p PLTCS on 10/21 for breech presentation with PPROM in labor via telephone for mood check.     #Mood check  - Patient having symptoms similar to postpartum depression that she has had previously  - This was well managed with citalopram, interested in trying this again  - Order for 10mg  citalopram daily placed  - Full postpartum visit 11/15, needs Pap    Mani Burnette MD    [facility]  Obstetrics, Gynecology, and Women's Health  9:55 AM 11/04/2024                          Again, thank you for allowing me to participate in the care of your patient.      Sincerely,    Mani Burnette MD

## 2024-11-04 NOTE — PROGRESS NOTES
Postpartum Visit Note    S:  Cathy Mace is a 29 year old  via telephone for mood check.     Pain is overall well managed. Bleeding is normal. Voiding and having normal bowel movements.     She states mood is not that good. She is not sleeping well with a new baby and a toddler. She does not necessarily feel down, but she does not have motivation to do any kind of work. She reports she is angry often, which is a change from her baseline. She denies suicidal or homicidal ideation. Denies intrusive thoughts. Denies seeing or hearing things that her  does not hear. She has been on citalopram in the past and this was effective.       Delivery Date: 10/21/24.    Delivering provider:  [facility] at [facility]  Type of delivery:  CS for breech presentation, PPROM at 35w5d     ROS: 10 point ROS neg other than the symptoms noted above in the HPI.     O:    Past Medical History:   Diagnosis Date    Hypothyroidism (acquired) 2024    Post partum depression 2024    was on Lexapro, weaned from 20 mg to 5 mg and ended     Rectal fissure 2024     Past Surgical History:   Procedure Laterality Date    APPENDECTOMY       SECTION N/A 10/21/2024    Procedure:  section;  Surgeon: Paulina Robertson MD;  Location: [facility]     Assessment and Plan:   Cathy Mace is a 29 year old  s/p PLTCS on 10/21 for breech presentation with PPROM in labor via telephone for mood check.     #Mood check  - Patient having symptoms similar to postpartum depression that she has had previously  - This was well managed with citalopram, interested in trying this again  - Order for 10mg citalopram daily placed  - Full postpartum visit 11/15, needs Pap    I spent ten minutes on this phone visit.     Mani Burnette MD    [facility]  Obstetrics, Gynecology, and Women's Health  9:55 AM 2024

## 2024-11-06 ENCOUNTER — TELEPHONE (OUTPATIENT)
Dept: OBGYN | Facility: CLINIC | Age: 29
End: 2024-11-06
Payer: COMMERCIAL

## 2024-11-06 ENCOUNTER — MYC MEDICAL ADVICE (OUTPATIENT)
Dept: OBGYN | Facility: CLINIC | Age: 29
End: 2024-11-06
Payer: COMMERCIAL

## 2024-11-07 ENCOUNTER — APPOINTMENT (OUTPATIENT)
Dept: CT IMAGING | Facility: CLINIC | Age: 29
DRG: 776 | End: 2024-11-07
Attending: FAMILY MEDICINE
Payer: COMMERCIAL

## 2024-11-07 ENCOUNTER — HOSPITAL ENCOUNTER (INPATIENT)
Facility: CLINIC | Age: 29
LOS: 2 days | Discharge: HOME | DRG: 776 | End: 2024-11-09
Attending: FAMILY MEDICINE | Admitting: OBSTETRICS & GYNECOLOGY
Payer: COMMERCIAL

## 2024-11-07 DIAGNOSIS — N71.9 ENDOMETRITIS: ICD-10-CM

## 2024-11-07 DIAGNOSIS — E03.9 HYPOTHYROIDISM (ACQUIRED): ICD-10-CM

## 2024-11-07 LAB
ALBUMIN SERPL BCG-MCNC: 4 G/DL (ref 3.5–5.2)
ALBUMIN UR-MCNC: NEGATIVE MG/DL
ALP SERPL-CCNC: 123 U/L (ref 40–150)
ALT SERPL W P-5'-P-CCNC: 42 U/L (ref 0–50)
ANION GAP SERPL CALCULATED.3IONS-SCNC: 14 MMOL/L (ref 7–15)
APPEARANCE UR: CLEAR
AST SERPL W P-5'-P-CCNC: 34 U/L (ref 0–45)
BASOPHILS # BLD AUTO: 0 10E3/UL (ref 0–0.2)
BASOPHILS NFR BLD AUTO: 0 %
BILIRUB SERPL-MCNC: 0.2 MG/DL
BILIRUB UR QL STRIP: NEGATIVE
BUN SERPL-MCNC: 9.4 MG/DL (ref 6–20)
CALCIUM SERPL-MCNC: 9.9 MG/DL (ref 8.8–10.4)
CHLORIDE SERPL-SCNC: 99 MMOL/L (ref 98–107)
COLOR UR AUTO: ABNORMAL
CREAT SERPL-MCNC: 0.57 MG/DL (ref 0.51–0.95)
EGFRCR SERPLBLD CKD-EPI 2021: >90 ML/MIN/1.73M2
EOSINOPHIL # BLD AUTO: 0.1 10E3/UL (ref 0–0.7)
EOSINOPHIL NFR BLD AUTO: 1 %
ERYTHROCYTE [DISTWIDTH] IN BLOOD BY AUTOMATED COUNT: 14 % (ref 10–15)
GLUCOSE SERPL-MCNC: 89 MG/DL (ref 70–99)
GLUCOSE UR STRIP-MCNC: NEGATIVE MG/DL
HCO3 SERPL-SCNC: 23 MMOL/L (ref 22–29)
HCT VFR BLD AUTO: 37 % (ref 35–47)
HGB BLD-MCNC: 11.9 G/DL (ref 11.7–15.7)
HGB UR QL STRIP: NEGATIVE
IMM GRANULOCYTES # BLD: 0.1 10E3/UL
IMM GRANULOCYTES NFR BLD: 1 %
KETONES UR STRIP-MCNC: NEGATIVE MG/DL
LACTATE SERPL-SCNC: 0.5 MMOL/L (ref 0.7–2)
LEUKOCYTE ESTERASE UR QL STRIP: NEGATIVE
LIPASE SERPL-CCNC: 20 U/L (ref 13–60)
LYMPHOCYTES # BLD AUTO: 1.8 10E3/UL (ref 0.8–5.3)
LYMPHOCYTES NFR BLD AUTO: 17 %
MCH RBC QN AUTO: 28.1 PG (ref 26.5–33)
MCHC RBC AUTO-ENTMCNC: 32.2 G/DL (ref 31.5–36.5)
MCV RBC AUTO: 87 FL (ref 78–100)
MONOCYTES # BLD AUTO: 0.5 10E3/UL (ref 0–1.3)
MONOCYTES NFR BLD AUTO: 4 %
MUCOUS THREADS #/AREA URNS LPF: PRESENT /LPF
NEUTROPHILS # BLD AUTO: 8.6 10E3/UL (ref 1.6–8.3)
NEUTROPHILS NFR BLD AUTO: 77 %
NITRATE UR QL: NEGATIVE
NRBC # BLD AUTO: 0 10E3/UL
NRBC BLD AUTO-RTO: 0 /100
PH UR STRIP: 5.5 [PH] (ref 5–7)
PLATELET # BLD AUTO: 296 10E3/UL (ref 150–450)
POTASSIUM SERPL-SCNC: 3.8 MMOL/L (ref 3.4–5.3)
PROCALCITONIN SERPL IA-MCNC: 0.03 NG/ML
PROT SERPL-MCNC: 7.7 G/DL (ref 6.4–8.3)
RBC # BLD AUTO: 4.24 10E6/UL (ref 3.8–5.2)
RBC URINE: 1 /HPF
SODIUM SERPL-SCNC: 136 MMOL/L (ref 135–145)
SP GR UR STRIP: 1.01 (ref 1–1.03)
SQUAMOUS EPITHELIAL: 1 /HPF
TSH SERPL DL<=0.005 MIU/L-ACNC: 0.15 UIU/ML (ref 0.3–4.2)
UROBILINOGEN UR STRIP-MCNC: NORMAL MG/DL
WBC # BLD AUTO: 11.2 10E3/UL (ref 4–11)
WBC URINE: 1 /HPF

## 2024-11-07 PROCEDURE — 96375 TX/PRO/DX INJ NEW DRUG ADDON: CPT | Performed by: FAMILY MEDICINE

## 2024-11-07 PROCEDURE — 85004 AUTOMATED DIFF WBC COUNT: CPT | Performed by: FAMILY MEDICINE

## 2024-11-07 PROCEDURE — 250N000011 HC RX IP 250 OP 636: Performed by: FAMILY MEDICINE

## 2024-11-07 PROCEDURE — 36415 COLL VENOUS BLD VENIPUNCTURE: CPT | Performed by: FAMILY MEDICINE

## 2024-11-07 PROCEDURE — 84443 ASSAY THYROID STIM HORMONE: CPT

## 2024-11-07 PROCEDURE — 87040 BLOOD CULTURE FOR BACTERIA: CPT | Performed by: FAMILY MEDICINE

## 2024-11-07 PROCEDURE — 84145 PROCALCITONIN (PCT): CPT | Performed by: FAMILY MEDICINE

## 2024-11-07 PROCEDURE — 83690 ASSAY OF LIPASE: CPT | Performed by: FAMILY MEDICINE

## 2024-11-07 PROCEDURE — 74177 CT ABD & PELVIS W/CONTRAST: CPT

## 2024-11-07 PROCEDURE — 250N000011 HC RX IP 250 OP 636

## 2024-11-07 PROCEDURE — 99285 EMERGENCY DEPT VISIT HI MDM: CPT | Mod: 25 | Performed by: FAMILY MEDICINE

## 2024-11-07 PROCEDURE — 96374 THER/PROPH/DIAG INJ IV PUSH: CPT | Performed by: FAMILY MEDICINE

## 2024-11-07 PROCEDURE — 120N000002 HC R&B MED SURG/OB UMMC

## 2024-11-07 PROCEDURE — 82435 ASSAY OF BLOOD CHLORIDE: CPT | Performed by: FAMILY MEDICINE

## 2024-11-07 PROCEDURE — 83605 ASSAY OF LACTIC ACID: CPT | Performed by: FAMILY MEDICINE

## 2024-11-07 PROCEDURE — 258N000003 HC RX IP 258 OP 636: Performed by: FAMILY MEDICINE

## 2024-11-07 PROCEDURE — 84439 ASSAY OF FREE THYROXINE: CPT

## 2024-11-07 PROCEDURE — 99285 EMERGENCY DEPT VISIT HI MDM: CPT | Performed by: FAMILY MEDICINE

## 2024-11-07 PROCEDURE — 258N000003 HC RX IP 258 OP 636

## 2024-11-07 PROCEDURE — 96361 HYDRATE IV INFUSION ADD-ON: CPT | Performed by: FAMILY MEDICINE

## 2024-11-07 PROCEDURE — 81001 URINALYSIS AUTO W/SCOPE: CPT | Performed by: FAMILY MEDICINE

## 2024-11-07 PROCEDURE — 96376 TX/PRO/DX INJ SAME DRUG ADON: CPT | Performed by: FAMILY MEDICINE

## 2024-11-07 PROCEDURE — 250N000013 HC RX MED GY IP 250 OP 250 PS 637

## 2024-11-07 PROCEDURE — 250N000009 HC RX 250: Performed by: FAMILY MEDICINE

## 2024-11-07 RX ORDER — PRENATAL VIT/IRON FUM/FOLIC AC 27MG-0.8MG
1 TABLET ORAL DAILY
Status: DISCONTINUED | OUTPATIENT
Start: 2024-11-07 | End: 2024-11-09 | Stop reason: HOSPADM

## 2024-11-07 RX ORDER — ONDANSETRON 4 MG/1
4 TABLET, ORALLY DISINTEGRATING ORAL EVERY 6 HOURS PRN
Status: DISCONTINUED | OUTPATIENT
Start: 2024-11-07 | End: 2024-11-09 | Stop reason: HOSPADM

## 2024-11-07 RX ORDER — ACETAMINOPHEN 325 MG/1
650 TABLET ORAL EVERY 4 HOURS PRN
Status: DISCONTINUED | OUTPATIENT
Start: 2024-11-07 | End: 2024-11-09 | Stop reason: HOSPADM

## 2024-11-07 RX ORDER — IOPAMIDOL 755 MG/ML
100 INJECTION, SOLUTION INTRAVASCULAR ONCE
Status: COMPLETED | OUTPATIENT
Start: 2024-11-07 | End: 2024-11-07

## 2024-11-07 RX ORDER — HYDROMORPHONE HCL IN WATER/PF 6 MG/30 ML
0.2 PATIENT CONTROLLED ANALGESIA SYRINGE INTRAVENOUS ONCE
Status: COMPLETED | OUTPATIENT
Start: 2024-11-07 | End: 2024-11-07

## 2024-11-07 RX ORDER — ONDANSETRON 2 MG/ML
4 INJECTION INTRAMUSCULAR; INTRAVENOUS EVERY 6 HOURS PRN
Status: DISCONTINUED | OUTPATIENT
Start: 2024-11-07 | End: 2024-11-09 | Stop reason: HOSPADM

## 2024-11-07 RX ORDER — KETOROLAC TROMETHAMINE 15 MG/ML
15 INJECTION, SOLUTION INTRAMUSCULAR; INTRAVENOUS ONCE
Status: COMPLETED | OUTPATIENT
Start: 2024-11-07 | End: 2024-11-07

## 2024-11-07 RX ORDER — OXYCODONE HYDROCHLORIDE 5 MG/1
5 TABLET ORAL EVERY 6 HOURS PRN
Status: ON HOLD | COMMUNITY
End: 2024-11-09

## 2024-11-07 RX ORDER — LEVOTHYROXINE SODIUM 75 UG/1
75 TABLET ORAL DAILY
Status: DISCONTINUED | OUTPATIENT
Start: 2024-11-07 | End: 2024-11-08

## 2024-11-07 RX ORDER — CALCIUM CARBONATE 500 MG/1
1000 TABLET, CHEWABLE ORAL 4 TIMES DAILY PRN
Status: DISCONTINUED | OUTPATIENT
Start: 2024-11-07 | End: 2024-11-09 | Stop reason: HOSPADM

## 2024-11-07 RX ORDER — CITALOPRAM HYDROBROMIDE 10 MG/1
10 TABLET ORAL DAILY
Status: DISCONTINUED | OUTPATIENT
Start: 2024-11-07 | End: 2024-11-09 | Stop reason: HOSPADM

## 2024-11-07 RX ORDER — MAGNESIUM HYDROXIDE/ALUMINUM HYDROXICE/SIMETHICONE 120; 1200; 1200 MG/30ML; MG/30ML; MG/30ML
15 SUSPENSION ORAL 3 TIMES DAILY PRN
Status: DISCONTINUED | OUTPATIENT
Start: 2024-11-07 | End: 2024-11-09 | Stop reason: HOSPADM

## 2024-11-07 RX ORDER — ACETAMINOPHEN 650 MG/1
650 SUPPOSITORY RECTAL EVERY 4 HOURS PRN
Status: DISCONTINUED | OUTPATIENT
Start: 2024-11-07 | End: 2024-11-09 | Stop reason: HOSPADM

## 2024-11-07 RX ORDER — AMOXICILLIN 250 MG
1 CAPSULE ORAL 2 TIMES DAILY PRN
Status: DISCONTINUED | OUTPATIENT
Start: 2024-11-07 | End: 2024-11-09 | Stop reason: HOSPADM

## 2024-11-07 RX ORDER — MAGNESIUM HYDROXIDE/ALUMINUM HYDROXICE/SIMETHICONE 120; 1200; 1200 MG/30ML; MG/30ML; MG/30ML
30 SUSPENSION ORAL EVERY 4 HOURS PRN
Status: DISCONTINUED | OUTPATIENT
Start: 2024-11-07 | End: 2024-11-07

## 2024-11-07 RX ORDER — ONDANSETRON 2 MG/ML
4 INJECTION INTRAMUSCULAR; INTRAVENOUS EVERY 30 MIN PRN
Status: DISCONTINUED | OUTPATIENT
Start: 2024-11-07 | End: 2024-11-07

## 2024-11-07 RX ORDER — CLINDAMYCIN PHOSPHATE 900 MG/50ML
900 INJECTION, SOLUTION INTRAVENOUS EVERY 8 HOURS
Status: DISCONTINUED | OUTPATIENT
Start: 2024-11-07 | End: 2024-11-08

## 2024-11-07 RX ORDER — LIDOCAINE 40 MG/G
CREAM TOPICAL
Status: DISCONTINUED | OUTPATIENT
Start: 2024-11-07 | End: 2024-11-09 | Stop reason: HOSPADM

## 2024-11-07 RX ORDER — AMOXICILLIN 250 MG
2 CAPSULE ORAL 2 TIMES DAILY PRN
Status: DISCONTINUED | OUTPATIENT
Start: 2024-11-07 | End: 2024-11-09 | Stop reason: HOSPADM

## 2024-11-07 RX ORDER — POLYETHYLENE GLYCOL 3350 17 G/17G
17 POWDER, FOR SOLUTION ORAL DAILY
Status: DISCONTINUED | OUTPATIENT
Start: 2024-11-07 | End: 2024-11-09 | Stop reason: HOSPADM

## 2024-11-07 RX ORDER — IBUPROFEN 800 MG/1
800 TABLET, FILM COATED ORAL EVERY 6 HOURS PRN
Status: DISCONTINUED | OUTPATIENT
Start: 2024-11-07 | End: 2024-11-09 | Stop reason: HOSPADM

## 2024-11-07 RX ADMIN — CITALOPRAM HYDROBROMIDE 10 MG: 10 TABLET ORAL at 20:34

## 2024-11-07 RX ADMIN — ONDANSETRON 4 MG: 2 INJECTION INTRAMUSCULAR; INTRAVENOUS at 18:52

## 2024-11-07 RX ADMIN — SODIUM CHLORIDE 1000 ML: 9 INJECTION, SOLUTION INTRAVENOUS at 12:53

## 2024-11-07 RX ADMIN — KETOROLAC TROMETHAMINE 15 MG: 15 INJECTION, SOLUTION INTRAMUSCULAR; INTRAVENOUS at 12:53

## 2024-11-07 RX ADMIN — SODIUM CHLORIDE 42 ML: 9 INJECTION, SOLUTION INTRAVENOUS at 15:26

## 2024-11-07 RX ADMIN — GENTAMICIN SULFATE 320 MG: 40 INJECTION, SOLUTION INTRAMUSCULAR; INTRAVENOUS at 19:35

## 2024-11-07 RX ADMIN — ACETAMINOPHEN 650 MG: 325 TABLET, FILM COATED ORAL at 19:31

## 2024-11-07 RX ADMIN — CLINDAMYCIN PHOSPHATE 900 MG: 900 INJECTION, SOLUTION INTRAVENOUS at 18:48

## 2024-11-07 RX ADMIN — LEVOTHYROXINE SODIUM 75 MCG: 75 TABLET ORAL at 20:34

## 2024-11-07 RX ADMIN — IBUPROFEN 800 MG: 800 TABLET, FILM COATED ORAL at 19:31

## 2024-11-07 RX ADMIN — IOPAMIDOL 83 ML: 755 INJECTION, SOLUTION INTRAVENOUS at 15:14

## 2024-11-07 RX ADMIN — PRENATAL VIT W/ FE FUMARATE-FA TAB 27-0.8 MG 1 TABLET: 27-0.8 TAB at 20:34

## 2024-11-07 RX ADMIN — POLYETHYLENE GLYCOL 3350 17 G: 17 POWDER, FOR SOLUTION ORAL at 20:35

## 2024-11-07 RX ADMIN — ONDANSETRON 4 MG: 2 INJECTION INTRAMUSCULAR; INTRAVENOUS at 14:07

## 2024-11-07 RX ADMIN — HYDROMORPHONE HYDROCHLORIDE 0.2 MG: 0.2 INJECTION, SOLUTION INTRAMUSCULAR; INTRAVENOUS; SUBCUTANEOUS at 16:40

## 2024-11-07 RX ADMIN — HYDROMORPHONE HYDROCHLORIDE 0.2 MG: 0.2 INJECTION, SOLUTION INTRAMUSCULAR; INTRAVENOUS; SUBCUTANEOUS at 12:55

## 2024-11-07 ASSESSMENT — ACTIVITIES OF DAILY LIVING (ADL)
ADLS_ACUITY_SCORE: 0

## 2024-11-07 NOTE — H&P
Gynecology Consult Note    Referring Physician: Dr. Larsen  Reason for Consult: Post Op pain     HPI: Cathy Mace is a 29 year old  now POD#17 from [facility] at [facility] who presented to the ED with 4 day history of increasing incisional pain, subjective fevers and chills. She presented to [facility] at 35w5d with PPROM. Given she was found to be breech, she underwent uncomplicated CS with removal of intact placenta. She was discharged on POD#2 without any concerns. She had a PP mood check with Dr. Burnette on 11 and was overall doing well, however had some mood concerns and therefore was started on citalopram for possible PP depression. She then started noticing chills and increasing in pain on either side of her incision. Denies any incisional drainage. Reports moderate vaginal bleeding that has increased over the last several days, but is not soaking a pad an hour. Having some dizziness and lightheadedness and overall generalized malaise.     On arrival to the ED, patient tachycardic to 120s with normal blood pressure, and afebrile. On vital recheck, patient with normal heart rate. CBC with mild leukocytosis to 11.2 and hemoglobin 11.9.  She had normal lactate and procal. CT pelvis was performed which showed possible developing endometritis and hypoattenuating lesions in the SALONI, with uterine dehiscence on the differential.     PMH:   Past Medical History:   Diagnosis Date    Hypothyroidism (acquired) 2024    Post partum depression 2024    was on Lexapro, weaned from 20 mg to 5 mg and ended     Rectal fissure 2024       PSH:   Past Surgical History:   Procedure Laterality Date    APPENDECTOMY       SECTION N/A 10/21/2024    Procedure:  section;  Surgeon: Paulina Robertson MD;  Location:  L+D       Social Hx:   Social History     Tobacco Use    Smoking status: Never    Smokeless tobacco: Never   Substance Use Topics    Alcohol use: Never    Drug use: Never     "    Family History: family history includes No Known Problems in her brother, brother, father, maternal grandfather, maternal grandmother, mother, paternal grandfather, paternal grandmother, and sister.  No family history of breast, ovarian or uterine cancer. No history of DVT or migranes.    ROS:   Negative except per HPI    Objective:   /74   Pulse 120   Temp 98.3  F (36.8  C) (Oral)   Resp 20   Ht 1.626 m (5' 4\")   Wt 76.7 kg (169 lb)   LMP 2024 (Exact Date)   SpO2 98%   BMI 29.01 kg/m    Constitutional: Healthy appearing, non toxic appearing female, no acute distress  Cardiovascular: Well perfused   Abdominal: Abdomen soft, mildly tender just inferior and superior the incision. BS normal. No masses, organomegaly  Pelvic Exam - : External genitalia normal, minimal bleeding on pad  Skin: No suspicious lesions or rashes  Psychiatric: mentation appears normal    Labs/Imaging:  Results for orders placed or performed during the hospital encounter of 24   CT Abdomen Pelvis w Contrast     Status: None    Narrative    CT ABDOMEN PELVIS W CONTRAST 2024 3:27 PM    CLINICAL HISTORY: Fever, abdominal pain after  17 days ago    TECHNIQUE: CT scan of the abdomen and pelvis was performed following  injection of IV contrast. Multiplanar reformats were obtained. Dose  reduction techniques were used.  CONTRAST: 87mL Isovue 370    COMPARISON: None.    FINDINGS:   LOWER CHEST: Subsegmental atelectasis is seen in the lung bases.    HEPATOBILIARY: Liver is enlarged measuring 18 cm in length.  Gallbladder is unremarkable.    PANCREAS: No significant mass, duct dilatation, or inflammatory  change.    SPLEEN: Normal size.    ADRENAL GLANDS: No significant nodules.    KIDNEYS/BLADDER: No significant mass, stones, or hydronephrosis.    BOWEL: No obstruction or inflammatory change.    PELVIC ORGANS: Uterus appears enlarged and heterogenous with  endometrial thickening seen measuring up to 1.8 cm. " Several linear  appearing hypoattenuating lesions are noted along the lower uterine  segment measuring up to 1.6 cm (series 4, image 60). At least one of  these lesions appears to be in close proximity to the adjacent  endometrium with possible continuity measuring up to 1.3 cm (series 4,  image 60). 2.8 x 1.8 cm mixed density lesion is seen along the left  adnexa with internal fat and soft tissue (series 2, image 73).    ADDITIONAL FINDINGS: Diastases recti is seen along the anterior pelvic  wall. Subcutaneous soft tissue thickening along the anterior pelvic  wall echo relates to scarring.    MUSCULOSKELETAL: No suspicious osseous lesions are seen.      Impression    IMPRESSION:   1.  Heterogenous, enlarged appearance of the uterus with endometrial  thickening likely in keeping with postpartum status. Although no  endometrial gas is seen, early endometritis cannot be excluded.  Several linear, hypoattenuating lesions are noted on the lower uterine  segment in the region of the expected  scar. At least one of  these lesions measuring up to 1.3 cm appears to be in continuity with  the underlying endometrium. Uterine dehiscence is in the differential  diagnosis. Postsurgical fluid collections in the area of scarring  could also appear similarly. Suggest gynecological consultation and  further characterization with pelvic ultrasound.  2.  Hepatomegaly.  3.  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 This lesion can also be  further characterized with pelvic ultrasound.    CAROLYNN MALDONADO MD         SYSTEM ID:  [id number]   Comprehensive metabolic panel     Status: Normal   Result Value Ref Range    Sodium 136 135 - 145 mmol/L    Potassium 3.8 3.4 - 5.3 mmol/L    Carbon Dioxide (CO2) 23 22 - 29 mmol/L    Anion Gap 14 7 - 15 mmol/L    Urea Nitrogen 9.4 6.0 - 20.0 mg/dL    Creatinine 0.57 0.51 - 0.95 mg/dL    GFR Estimate >90 >60 mL/min/1.73m2     Calcium 9.9 8.8 - 10.4 mg/dL    Chloride 99 98 - 107 mmol/L    Glucose 89 70 - 99 mg/dL    Alkaline Phosphatase 123 40 - 150 U/L    AST 34 0 - 45 U/L    ALT 42 0 - 50 U/L    Protein Total 7.7 6.4 - 8.3 g/dL    Albumin 4.0 3.5 - 5.2 g/dL    Bilirubin Total 0.2 <=1.2 mg/dL   UA with Microscopic reflex to Culture     Status: Abnormal    Specimen: Urine, Clean Catch   Result Value Ref Range    Color Urine Light Yellow Colorless, Straw, Light Yellow, Yellow    Appearance Urine Clear Clear    Glucose Urine Negative Negative mg/dL    Bilirubin Urine Negative Negative    Ketones Urine Negative Negative mg/dL    Specific Gravity Urine 1.010 1.003 - 1.035    Blood Urine Negative Negative    pH Urine 5.5 5.0 - 7.0    Protein Albumin Urine Negative Negative mg/dL    Urobilinogen Urine Normal Normal, 2.0 mg/dL    Nitrite Urine Negative Negative    Leukocyte Esterase Urine Negative Negative    Mucus Urine Present (A) None Seen /LPF    RBC Urine 1 <=2 /HPF    WBC Urine 1 <=5 /HPF    Squamous Epithelials Urine 1 <=1 /HPF    Narrative    Urine Culture not indicated   Lipase     Status: Normal   Result Value Ref Range    Lipase 20 13 - 60 U/L   Lactic acid whole blood with 1x repeat in 2 hr when >2     Status: Abnormal   Result Value Ref Range    Lactic Acid, Initial 0.5 (L) 0.7 - 2.0 mmol/L   Procalcitonin     Status: Normal   Result Value Ref Range    Procalcitonin 0.03 <0.50 ng/mL   CBC with platelets and differential     Status: Abnormal   Result Value Ref Range    WBC Count 11.2 (H) 4.0 - 11.0 10e3/uL    RBC Count 4.24 3.80 - 5.20 10e6/uL    Hemoglobin 11.9 11.7 - 15.7 g/dL    Hematocrit 37.0 35.0 - 47.0 %    MCV 87 78 - 100 fL    MCH 28.1 26.5 - 33.0 pg    MCHC 32.2 31.5 - 36.5 g/dL    RDW 14.0 10.0 - 15.0 %    Platelet Count 296 150 - 450 10e3/uL    % Neutrophils 77 %    % Lymphocytes 17 %    % Monocytes 4 %    % Eosinophils 1 %    % Basophils 0 %    % Immature Granulocytes 1 %    NRBCs per 100 WBC 0 <1 /100    Absolute  Neutrophils 8.6 (H) 1.6 - 8.3 10e3/uL    Absolute Lymphocytes 1.8 0.8 - 5.3 10e3/uL    Absolute Monocytes 0.5 0.0 - 1.3 10e3/uL    Absolute Eosinophils 0.1 0.0 - 0.7 10e3/uL    Absolute Basophils 0.0 0.0 - 0.2 10e3/uL    Absolute Immature Granulocytes 0.1 <=0.4 10e3/uL    Absolute NRBCs 0.0 10e3/uL   CBC with platelets differential     Status: Abnormal    Narrative    The following orders were created for panel order CBC with platelets differential.  Procedure                               Abnormality         Status                     ---------                               -----------         ------                     CBC with platelets and d...[[id number]]  Abnormal            Final result                 Please view results for these tests on the individual orders.        Assessment/Plan:   Cathy aMce is a 29 year old  now POD#17 from [facility] at [facility] for PPROM and breech presentation, who presents with leukocytosis, increasing lower abdominal pain and concern for postpartum endometritis. Patient overall appears non toxic, with normal lactate and continues to be afebrile. CT A/P without concern for seroma or abscess with possible signs with thickened stripe c/f early endometritis with hypo attenuating lesions cannot rule out dehiscience. On review of images, more likely consistent with normal post-operative changes. Exam notable for moderate tenderness just superior to the incision. Given increase in her pain and mild leukocytosis, will plan for admission for IV antibiotics and close monitoring for suspected endometritis. Although low concern for GAS at this time, will plan for uterine aspiration for culture/gram stain if clinically worsens.     #Abdominal Pain  #C/f PP Endometritis   #POD 17 [facility]   Patient with new onset pain after CS with leukocytosis and generalized malaise. Afebrile with normal vitals. Low concern for sepsis.  - WBC 11.2, Lactate normal; will repeat CBC in AM  - D#1 Gent/Clindamycin for  at least 24 hours or until symptomatically improved   - Low threshold for pelvic ultrasound and/or uterine aspiration if clinically not improving or worsening.   - Monitor vitals q4hrs    #History of PP Depression  - PTA Citalopram   - Will continue to monitor mood  - Consider SW consult    #Hypothyroidism   - Last TSH 2.27; Will repeat   - Continue PTA Levothyroxine    #Hepatomegaly   Unknown etiology at this time. Patient with GERD symptoms. Liver function normal.   - Consider Curbside Medicine in the AM.     #PP  - Routine PP cares     Discussed with Dr. Dilip Foreman MD  OB/GYN Resident, PGY-4    [facility] staff:  Appreciate note by Dr. foreman.  I have seen and examined the patient without the resident. I have reviewed, edited, and agree with the note.    My findings are: pt having significant upper abdomen irritation. Describes lots of wretching yesterday, so may be related to gastritis vs MSK pain from the same.  Plan GI cocktail.  Has hepatomegaly noted on imaging. Will plan to get Medicine consult in AM re; hepatomegaly.     Dafne Cherry MD, FACOG  11/7/2024  9:36 PM

## 2024-11-07 NOTE — ED NOTES
Pt did not want to start the antibiotics until she has spoken with the OB, MD. Because pharmacy stated that the antibiotics will be present in her breast milk. Pt is breast feeding.

## 2024-11-07 NOTE — TELEPHONE ENCOUNTER
LM on VM for patient to call back with questions and notified of MyChart message. Patient needs to go to ED or Urgent Care to evaluate infection.

## 2024-11-07 NOTE — TELEPHONE ENCOUNTER
Returned answering service call.    POD#16 s/p [facility] who calls with 3 days chills in whole body, pain by incision, worse with movement, not really much difference with ibuprofen/tylenol.  Sometimes dysuria  Denies incisional redness  Took temp digitally, was 97.3   Given degree of discomfort, recommended presentation tonight for evaluation.    Valerie Meade MD MPH

## 2024-11-07 NOTE — ED TRIAGE NOTES
Triage Assessment (Adult)       Row Name 11/07/24 1056          Triage Assessment    Airway WDL WDL        Respiratory WDL    Respiratory WDL WDL        Skin Circulation/Temperature WDL    Skin Circulation/Temperature WDL WDL        Cardiac WDL    Cardiac WDL WDL        Peripheral/Neurovascular WDL    Peripheral Neurovascular WDL WDL        Cognitive/Neuro/Behavioral WDL    Cognitive/Neuro/Behavioral WDL WDL

## 2024-11-07 NOTE — ED PROVIDER NOTES
ED Provider Note  [facility]      History     Chief Complaint   Patient presents with    Wound Infection     Pt delivered baby 3 eeks ago by . Pt states increase of incisional pain w/ vomiting over the last few days.     The history is provided by the patient and medical records.     Cathy Mace is a 29 year old female  with a history of  section at 35w5d (10/21/2024) 2/2 breech presentation and s/p appendectomy () who presents to the ED with her  and 2 children for abdominal pain.  Patient reports she had been feeling better and having decreased pain until 3 days ago.  She describes a stinging pain along the incision site as well as into her right upper quadrant abdomen.  Patient reports standing and moving around worsens her abdominal pain.  Today, the abdominal pain feels more generalized.  Yesterday the patient began vomiting.  Additionally, patient occasionally experiences dysuria.  The patient has had some vaginal bleeding that has seemed to increase over the past couple of days.  Patient has been passing normal bowel movements but yesterday the stools became more loose and frequent.  Patient has been taking Tylenol, Advil, oxycodone 5 mg (typically once daily) for her pain.  Patient last took a dose of oxycodone last night.  Patient is breast-feeding her .  She denies painful or red breasts or nipples.  Patient denies experiencing a fever, chills, sore throat, cough or rhinorrhea.    Past Medical History  Past Medical History:   Diagnosis Date    Hypothyroidism (acquired) 2024    Post partum depression 2024    was on Lexapro, weaned from 20 mg to 5 mg and ended     Rectal fissure 2024     Past Surgical History:   Procedure Laterality Date    APPENDECTOMY       SECTION N/A 10/21/2024    Procedure:  section;  Surgeon: Paulina Robertson MD;  Location:  L+D     acetaminophen (TYLENOL) 325 MG  "tablet  citalopram (CELEXA) 10 MG tablet  ibuprofen (ADVIL/MOTRIN) 800 MG tablet  levothyroxine (SYNTHROID/LEVOTHROID) 75 MCG tablet  oxyCODONE (ROXICODONE) 5 MG tablet  Prenatal Vit-Fe Fumarate-FA (PRENATAL PLUS) 27-1 MG TABS  famotidine (PEPCID) 20 MG tablet      No Known Allergies  Family History  Family History   Problem Relation Age of Onset    No Known Problems Mother     No Known Problems Father     No Known Problems Sister     No Known Problems Brother     No Known Problems Brother     No Known Problems Maternal Grandmother         unknown    No Known Problems Maternal Grandfather         unknown    No Known Problems Paternal Grandmother         unknown    No Known Problems Paternal Grandfather         unknown     Social History   Social History     Tobacco Use    Smoking status: Never    Smokeless tobacco: Never   Substance Use Topics    Alcohol use: Never    Drug use: Never      Past medical history, past surgical history, medications, allergies, family history, and social history were reviewed with the patient. No additional pertinent items.   A medically appropriate review of systems was performed with pertinent positives and negatives noted in the HPI, and all other systems negative.    Physical Exam   BP: 108/74  Pulse: 120  Temp: 98.3  F (36.8  C)  Resp: 20  Height: 162.6 cm (5' 4\")  Weight: 76.7 kg (169 lb)  SpO2: 98 %  Physical Exam  Vitals and nursing note reviewed.   Constitutional:       General: She is not in acute distress.     Appearance: Normal appearance. She is not diaphoretic.   HENT:      Head: Atraumatic.      Mouth/Throat:      Mouth: Mucous membranes are moist.   Eyes:      General: No scleral icterus.     Conjunctiva/sclera: Conjunctivae normal.   Cardiovascular:      Rate and Rhythm: Regular rhythm. Tachycardia present.      Heart sounds: Normal heart sounds.      Comments: Regular tachycardia, rate 120  Pulmonary:      Effort: No respiratory distress.      Breath sounds: Normal " breath sounds.   Abdominal:      General: Abdomen is flat.      Tenderness: There is abdominal tenderness in the right upper quadrant and suprapubic area. There is guarding. There is no rebound.      Comments:  incision is clean, dry and intact there is no drainage or erythema.  She is tender along the superior aspect.  No abnormal swelling noted, no erythema or warmth.  Uterus is moderately tender but firm.   Musculoskeletal:      Cervical back: Neck supple.   Skin:     General: Skin is warm.      Findings: No rash.   Neurological:      General: No focal deficit present.      Mental Status: She is alert and oriented to person, place, and time.           ED Course, Procedures, & Data      Procedures            IV Antibiotics given and/or elevated Lactate of 0.5 and no sepsis note found - Delete this reminder and enter the sepsis note or '.edcms' before signing chart.>>>     Results for orders placed or performed during the hospital encounter of 24   CT Abdomen Pelvis w Contrast     Status: None    Narrative    CT ABDOMEN PELVIS W CONTRAST 2024 3:27 PM    CLINICAL HISTORY: Fever, abdominal pain after  17 days ago    TECHNIQUE: CT scan of the abdomen and pelvis was performed following  injection of IV contrast. Multiplanar reformats were obtained. Dose  reduction techniques were used.  CONTRAST: 87mL Isovue 370    COMPARISON: None.    FINDINGS:   LOWER CHEST: Subsegmental atelectasis is seen in the lung bases.    HEPATOBILIARY: Liver is enlarged measuring 18 cm in length.  Gallbladder is unremarkable.    PANCREAS: No significant mass, duct dilatation, or inflammatory  change.    SPLEEN: Normal size.    ADRENAL GLANDS: No significant nodules.    KIDNEYS/BLADDER: No significant mass, stones, or hydronephrosis.    BOWEL: No obstruction or inflammatory change.    PELVIC ORGANS: Uterus appears enlarged and heterogenous with  endometrial thickening seen measuring up to 1.8 cm. Several  linear  appearing hypoattenuating lesions are noted along the lower uterine  segment measuring up to 1.6 cm (series 4, image 60). At least one of  these lesions appears to be in close proximity to the adjacent  endometrium with possible continuity measuring up to 1.3 cm (series 4,  image 60). 2.8 x 1.8 cm mixed density lesion is seen along the left  adnexa with internal fat and soft tissue (series 2, image 73).    ADDITIONAL FINDINGS: Diastases recti is seen along the anterior pelvic  wall. Subcutaneous soft tissue thickening along the anterior pelvic  wall echo relates to scarring.    MUSCULOSKELETAL: No suspicious osseous lesions are seen.      Impression    IMPRESSION:   1.  Heterogenous, enlarged appearance of the uterus with endometrial  thickening likely in keeping with postpartum status. Although no  endometrial gas is seen, early endometritis cannot be excluded.  Several linear, hypoattenuating lesions are noted on the lower uterine  segment in the region of the expected  scar. At least one of  these lesions measuring up to 1.3 cm appears to be in continuity with  the underlying endometrium. Uterine dehiscence is in the differential  diagnosis. Postsurgical fluid collections in the area of scarring  could also appear similarly. Suggest gynecological consultation and  further characterization with pelvic ultrasound.  2.  Hepatomegaly.  3.  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 This lesion can also be  further characterized with pelvic ultrasound.    CAROLYNN MALDONADO MD         SYSTEM ID:  [id number]   Comprehensive metabolic panel     Status: Normal   Result Value Ref Range    Sodium 136 135 - 145 mmol/L    Potassium 3.8 3.4 - 5.3 mmol/L    Carbon Dioxide (CO2) 23 22 - 29 mmol/L    Anion Gap 14 7 - 15 mmol/L    Urea Nitrogen 9.4 6.0 - 20.0 mg/dL    Creatinine 0.57 0.51 - 0.95 mg/dL    GFR Estimate >90 >60 mL/min/1.73m2    Calcium  9.9 8.8 - 10.4 mg/dL    Chloride 99 98 - 107 mmol/L    Glucose 89 70 - 99 mg/dL    Alkaline Phosphatase 123 40 - 150 U/L    AST 34 0 - 45 U/L    ALT 42 0 - 50 U/L    Protein Total 7.7 6.4 - 8.3 g/dL    Albumin 4.0 3.5 - 5.2 g/dL    Bilirubin Total 0.2 <=1.2 mg/dL   UA with Microscopic reflex to Culture     Status: Abnormal    Specimen: Urine, Clean Catch   Result Value Ref Range    Color Urine Light Yellow Colorless, Straw, Light Yellow, Yellow    Appearance Urine Clear Clear    Glucose Urine Negative Negative mg/dL    Bilirubin Urine Negative Negative    Ketones Urine Negative Negative mg/dL    Specific Gravity Urine 1.010 1.003 - 1.035    Blood Urine Negative Negative    pH Urine 5.5 5.0 - 7.0    Protein Albumin Urine Negative Negative mg/dL    Urobilinogen Urine Normal Normal, 2.0 mg/dL    Nitrite Urine Negative Negative    Leukocyte Esterase Urine Negative Negative    Mucus Urine Present (A) None Seen /LPF    RBC Urine 1 <=2 /HPF    WBC Urine 1 <=5 /HPF    Squamous Epithelials Urine 1 <=1 /HPF    Narrative    Urine Culture not indicated   Lipase     Status: Normal   Result Value Ref Range    Lipase 20 13 - 60 U/L   Lactic acid whole blood with 1x repeat in 2 hr when >2     Status: Abnormal   Result Value Ref Range    Lactic Acid, Initial 0.5 (L) 0.7 - 2.0 mmol/L   Procalcitonin     Status: Normal   Result Value Ref Range    Procalcitonin 0.03 <0.50 ng/mL   CBC with platelets and differential     Status: Abnormal   Result Value Ref Range    WBC Count 11.2 (H) 4.0 - 11.0 10e3/uL    RBC Count 4.24 3.80 - 5.20 10e6/uL    Hemoglobin 11.9 11.7 - 15.7 g/dL    Hematocrit 37.0 35.0 - 47.0 %    MCV 87 78 - 100 fL    MCH 28.1 26.5 - 33.0 pg    MCHC 32.2 31.5 - 36.5 g/dL    RDW 14.0 10.0 - 15.0 %    Platelet Count 296 150 - 450 10e3/uL    % Neutrophils 77 %    % Lymphocytes 17 %    % Monocytes 4 %    % Eosinophils 1 %    % Basophils 0 %    % Immature Granulocytes 1 %    NRBCs per 100 WBC 0 <1 /100    Absolute Neutrophils  8.6 (H) 1.6 - 8.3 10e3/uL    Absolute Lymphocytes 1.8 0.8 - 5.3 10e3/uL    Absolute Monocytes 0.5 0.0 - 1.3 10e3/uL    Absolute Eosinophils 0.1 0.0 - 0.7 10e3/uL    Absolute Basophils 0.0 0.0 - 0.2 10e3/uL    Absolute Immature Granulocytes 0.1 <=0.4 10e3/uL    Absolute NRBCs 0.0 10e3/uL   CBC with platelets differential     Status: Abnormal    Narrative    The following orders were created for panel order CBC with platelets differential.  Procedure                               Abnormality         Status                     ---------                               -----------         ------                     CBC with platelets and d...[[id number]]  Abnormal            Final result                 Please view results for these tests on the individual orders.     Medications   ondansetron (ZOFRAN) injection 4 mg (4 mg Intravenous $Given 11/7/24 1407)   gentamicin (GARAMYCIN) 380 mg in sodium chloride 0.9 % 50 mL intermittent infusion (has no administration in time range)   clindamycin (CLEOCIN) 900 mg in 50 mL D5W intermittent infusion (has no administration in time range)   sodium chloride 0.9% BOLUS 1,000 mL (0 mLs Intravenous Stopped 11/7/24 1504)   HYDROmorphone (DILAUDID) injection 0.2 mg (0.2 mg Intravenous $Given 11/7/24 1255)   ketorolac (TORADOL) injection 15 mg (15 mg Intravenous $Given 11/7/24 1253)   iopamidol (ISOVUE-370) solution 100 mL (83 mLs Intravenous $Given 11/7/24 1514)   HYDROmorphone (DILAUDID) injection 0.2 mg (0.2 mg Intravenous $Given 11/7/24 1640)   sodium chloride 0.9 % bag 100mL (42 mLs Intravenous $Given 11/7/24 1526)     Labs Ordered and Resulted from Time of ED Arrival to Time of ED Departure   ROUTINE UA WITH MICROSCOPIC REFLEX TO CULTURE - Abnormal       Result Value    Color Urine Light Yellow      Appearance Urine Clear      Glucose Urine Negative      Bilirubin Urine Negative      Ketones Urine Negative      Specific Gravity Urine 1.010      Blood Urine Negative      pH Urine 5.5       Protein Albumin Urine Negative      Urobilinogen Urine Normal      Nitrite Urine Negative      Leukocyte Esterase Urine Negative      Mucus Urine Present (*)     RBC Urine 1      WBC Urine 1      Squamous Epithelials Urine 1     LACTIC ACID WHOLE BLOOD WITH 1X REPEAT IN 2 HR WHEN >2 - Abnormal    Lactic Acid, Initial 0.5 (*)    CBC WITH PLATELETS AND DIFFERENTIAL - Abnormal    WBC Count 11.2 (*)     RBC Count 4.24      Hemoglobin 11.9      Hematocrit 37.0      MCV 87      MCH 28.1      MCHC 32.2      RDW 14.0      Platelet Count 296      % Neutrophils 77      % Lymphocytes 17      % Monocytes 4      % Eosinophils 1      % Basophils 0      % Immature Granulocytes 1      NRBCs per 100 WBC 0      Absolute Neutrophils 8.6 (*)     Absolute Lymphocytes 1.8      Absolute Monocytes 0.5      Absolute Eosinophils 0.1      Absolute Basophils 0.0      Absolute Immature Granulocytes 0.1      Absolute NRBCs 0.0     COMPREHENSIVE METABOLIC PANEL - Normal    Sodium 136      Potassium 3.8      Carbon Dioxide (CO2) 23      Anion Gap 14      Urea Nitrogen 9.4      Creatinine 0.57      GFR Estimate >90      Calcium 9.9      Chloride 99      Glucose 89      Alkaline Phosphatase 123      AST 34      ALT 42      Protein Total 7.7      Albumin 4.0      Bilirubin Total 0.2     LIPASE - Normal    Lipase 20     PROCALCITONIN - Normal    Procalcitonin 0.03     BLOOD CULTURE     CT Abdomen Pelvis w Contrast   Final Result   IMPRESSION:    1.  Heterogenous, enlarged appearance of the uterus with endometrial   thickening likely in keeping with postpartum status. Although no   endometrial gas is seen, early endometritis cannot be excluded.   Several linear, hypoattenuating lesions are noted on the lower uterine   segment in the region of the expected  scar. At least one of   these lesions measuring up to 1.3 cm appears to be in continuity with   the underlying endometrium. Uterine dehiscence is in the differential   diagnosis.  Postsurgical fluid collections in the area of scarring   could also appear similarly. Suggest gynecological consultation and   further characterization with pelvic ultrasound.   2.  Hepatomegaly.   3.  2.8 cm mixed density lesion along the left adnexa with internal   fat is suspected to reflect a dermoid tumor. This lesion can also be   further characterized with pelvic ultrasound.      CAROLYNN MALDONADO MD            SYSTEM ID:  [id number]             Critical care was not performed.     Medical Decision Making  The patient's presentation was of high complexity (an acute health issue posing potential threat to life or bodily function).    The patient's evaluation involved:  review of external note(s) from 1 sources (review of recent operative report and hospital discharge summary)  ordering and/or review of 3+ test(s) in this encounter (see separate area of note for details)  independent interpretation of testing performed by another health professional (CT abdomen pelvis images personally reviewed, also reviewed with radiologist and OB/GYN service)  discussion of management or test interpretation with another health professional (discussed with OB/GYN)    The patient's management necessitated moderate risk (prescription drug management including medications given in the ED), moderate risk (IV contrast administration), high risk (a parenteral controlled substance), and high risk (a decision regarding hospitalization).    Assessment & Plan    29-year-old female who is now 17 days postpartum presenting with pain over the uterus, fever and chills, increased vaginal bleeding.  Differential diagnosis including postpartum endometritis, wound infection or seroma, retained products of conception, UTI/pyelonephritis, diverticulitis, appendicitis, cholecystitis, mastitis, postpartum pneumonia.  On exam the patient is afebrile, her blood pressure is normal she is slightly tachycardic.  She is tired and ill-appearing.  Her  physical exam is significant for right upper quadrant tenderness and tenderness directly over the uterus above the superior aspect of the incision.  The incision itself appears grossly normal.  Her physical exam otherwise reveals no potential source of infection.  The patient's urinalysis is negative, her white blood cell count is elevated, her LFTs and lipase are normal.  Discussed with OB/GYN obtained a CT of the abdomen pelvis with IV contrast, findings suspicious for endometritis.  Patient will be admitted for IV antibiotics, serial exam.  Have discussed with the OB service, have treated the patient for pain successfully.  She appears stable for the floor.    I have reviewed the nursing notes. I have reviewed the findings, diagnosis, plan and need for follow up with the patient.    New Prescriptions    No medications on file       Final diagnoses:   Endometritis     I, Nicolas Mayfield, am serving as a trained medical scribe to document services personally performed by Zuhair Larsen MD, based on the provider's statements to me.      IZuhair MD, was physically present and have reviewed and verified the accuracy of this note documented by Nicolas Mayfield.     Zuhair Larsen MD  [facility] EMERGENCY DEPARTMENT  11/7/2024     Zuhair Larsen MD  11/07/24 0207

## 2024-11-08 LAB
ALBUMIN SERPL BCG-MCNC: 3.6 G/DL (ref 3.5–5.2)
ALP SERPL-CCNC: 106 U/L (ref 40–150)
ALT SERPL W P-5'-P-CCNC: 35 U/L (ref 0–50)
ANION GAP SERPL CALCULATED.3IONS-SCNC: 10 MMOL/L (ref 7–15)
AST SERPL W P-5'-P-CCNC: 27 U/L (ref 0–45)
BASOPHILS # BLD AUTO: 0 10E3/UL (ref 0–0.2)
BASOPHILS NFR BLD AUTO: 0 %
BILIRUB SERPL-MCNC: 0.2 MG/DL
BUN SERPL-MCNC: 9 MG/DL (ref 6–20)
CALCIUM SERPL-MCNC: 9.1 MG/DL (ref 8.8–10.4)
CHLORIDE SERPL-SCNC: 102 MMOL/L (ref 98–107)
CREAT SERPL-MCNC: 0.71 MG/DL (ref 0.51–0.95)
EGFRCR SERPLBLD CKD-EPI 2021: >90 ML/MIN/1.73M2
EOSINOPHIL # BLD AUTO: 0.1 10E3/UL (ref 0–0.7)
EOSINOPHIL NFR BLD AUTO: 2 %
ERYTHROCYTE [DISTWIDTH] IN BLOOD BY AUTOMATED COUNT: 14.1 % (ref 10–15)
GLUCOSE SERPL-MCNC: 86 MG/DL (ref 70–99)
HCO3 SERPL-SCNC: 24 MMOL/L (ref 22–29)
HCT VFR BLD AUTO: 34.2 % (ref 35–47)
HGB BLD-MCNC: 10.9 G/DL (ref 11.7–15.7)
IMM GRANULOCYTES # BLD: 0 10E3/UL
IMM GRANULOCYTES NFR BLD: 0 %
LYMPHOCYTES # BLD AUTO: 2.5 10E3/UL (ref 0.8–5.3)
LYMPHOCYTES NFR BLD AUTO: 33 %
MCH RBC QN AUTO: 28.3 PG (ref 26.5–33)
MCHC RBC AUTO-ENTMCNC: 31.9 G/DL (ref 31.5–36.5)
MCV RBC AUTO: 89 FL (ref 78–100)
MONOCYTES # BLD AUTO: 0.5 10E3/UL (ref 0–1.3)
MONOCYTES NFR BLD AUTO: 7 %
NEUTROPHILS # BLD AUTO: 4.3 10E3/UL (ref 1.6–8.3)
NEUTROPHILS NFR BLD AUTO: 58 %
NRBC # BLD AUTO: 0 10E3/UL
NRBC BLD AUTO-RTO: 0 /100
PLATELET # BLD AUTO: 312 10E3/UL (ref 150–450)
POTASSIUM SERPL-SCNC: 4.1 MMOL/L (ref 3.4–5.3)
PROT SERPL-MCNC: 7 G/DL (ref 6.4–8.3)
RBC # BLD AUTO: 3.85 10E6/UL (ref 3.8–5.2)
SODIUM SERPL-SCNC: 136 MMOL/L (ref 135–145)
T4 FREE SERPL-MCNC: 1.39 NG/DL (ref 0.9–1.7)
WBC # BLD AUTO: 7.5 10E3/UL (ref 4–11)

## 2024-11-08 PROCEDURE — 250N000013 HC RX MED GY IP 250 OP 250 PS 637

## 2024-11-08 PROCEDURE — 250N000011 HC RX IP 250 OP 636

## 2024-11-08 PROCEDURE — 258N000003 HC RX IP 258 OP 636: Performed by: STUDENT IN AN ORGANIZED HEALTH CARE EDUCATION/TRAINING PROGRAM

## 2024-11-08 PROCEDURE — 250N000011 HC RX IP 250 OP 636: Performed by: STUDENT IN AN ORGANIZED HEALTH CARE EDUCATION/TRAINING PROGRAM

## 2024-11-08 PROCEDURE — 84460 ALANINE AMINO (ALT) (SGPT): CPT

## 2024-11-08 PROCEDURE — 36415 COLL VENOUS BLD VENIPUNCTURE: CPT

## 2024-11-08 PROCEDURE — 99222 1ST HOSP IP/OBS MODERATE 55: CPT | Performed by: PHYSICIAN ASSISTANT

## 2024-11-08 PROCEDURE — 120N000002 HC R&B MED SURG/OB UMMC

## 2024-11-08 PROCEDURE — 85025 COMPLETE CBC W/AUTO DIFF WBC: CPT

## 2024-11-08 RX ORDER — LEVOTHYROXINE SODIUM 75 UG/1
75 TABLET ORAL DAILY
Status: DISCONTINUED | OUTPATIENT
Start: 2024-11-09 | End: 2024-11-08

## 2024-11-08 RX ORDER — LEVOTHYROXINE SODIUM 50 UG/1
50 TABLET ORAL DAILY
Status: DISCONTINUED | OUTPATIENT
Start: 2024-11-09 | End: 2024-11-08

## 2024-11-08 RX ORDER — LEVOTHYROXINE SODIUM 50 UG/1
50 TABLET ORAL DAILY
Status: DISCONTINUED | OUTPATIENT
Start: 2024-11-09 | End: 2024-11-09 | Stop reason: HOSPADM

## 2024-11-08 RX ORDER — SODIUM CHLORIDE, SODIUM LACTATE, POTASSIUM CHLORIDE, CALCIUM CHLORIDE 600; 310; 30; 20 MG/100ML; MG/100ML; MG/100ML; MG/100ML
INJECTION, SOLUTION INTRAVENOUS
Status: COMPLETED
Start: 2024-11-08 | End: 2024-11-09

## 2024-11-08 RX ORDER — CLINDAMYCIN PHOSPHATE 900 MG/50ML
900 INJECTION, SOLUTION INTRAVENOUS EVERY 8 HOURS
Status: COMPLETED | OUTPATIENT
Start: 2024-11-08 | End: 2024-11-08

## 2024-11-08 RX ORDER — SODIUM CHLORIDE, SODIUM LACTATE, POTASSIUM CHLORIDE, CALCIUM CHLORIDE 600; 310; 30; 20 MG/100ML; MG/100ML; MG/100ML; MG/100ML
INJECTION, SOLUTION INTRAVENOUS CONTINUOUS
Status: DISCONTINUED | OUTPATIENT
Start: 2024-11-09 | End: 2024-11-09

## 2024-11-08 RX ADMIN — LEVOTHYROXINE SODIUM 75 MCG: 75 TABLET ORAL at 08:11

## 2024-11-08 RX ADMIN — ALUMINUM HYDROXIDE, MAGNESIUM HYDROXIDE, AND SIMETHICONE 15 ML: 1200; 120; 1200 SUSPENSION ORAL at 09:39

## 2024-11-08 RX ADMIN — ALUMINUM HYDROXIDE, MAGNESIUM HYDROXIDE, AND SIMETHICONE 15 ML: 1200; 120; 1200 SUSPENSION ORAL at 20:41

## 2024-11-08 RX ADMIN — IBUPROFEN 800 MG: 800 TABLET, FILM COATED ORAL at 23:09

## 2024-11-08 RX ADMIN — ACETAMINOPHEN 650 MG: 325 TABLET, FILM COATED ORAL at 16:49

## 2024-11-08 RX ADMIN — ACETAMINOPHEN 650 MG: 325 TABLET, FILM COATED ORAL at 21:47

## 2024-11-08 RX ADMIN — ALUMINUM HYDROXIDE, MAGNESIUM HYDROXIDE, AND SIMETHICONE 15 ML: 1200; 120; 1200 SUSPENSION ORAL at 00:33

## 2024-11-08 RX ADMIN — CLINDAMYCIN PHOSPHATE 900 MG: 900 INJECTION, SOLUTION INTRAVENOUS at 02:50

## 2024-11-08 RX ADMIN — PRENATAL VIT W/ FE FUMARATE-FA TAB 27-0.8 MG 1 TABLET: 27-0.8 TAB at 08:11

## 2024-11-08 RX ADMIN — IBUPROFEN 800 MG: 800 TABLET, FILM COATED ORAL at 16:49

## 2024-11-08 RX ADMIN — ACETAMINOPHEN 650 MG: 325 TABLET, FILM COATED ORAL at 03:04

## 2024-11-08 RX ADMIN — CLINDAMYCIN PHOSPHATE 900 MG: 900 INJECTION, SOLUTION INTRAVENOUS at 09:41

## 2024-11-08 RX ADMIN — POLYETHYLENE GLYCOL 3350 17 G: 17 POWDER, FOR SOLUTION ORAL at 08:12

## 2024-11-08 RX ADMIN — IBUPROFEN 800 MG: 800 TABLET, FILM COATED ORAL at 03:05

## 2024-11-08 RX ADMIN — CLINDAMYCIN PHOSPHATE 900 MG: 900 INJECTION, SOLUTION INTRAVENOUS at 17:36

## 2024-11-08 RX ADMIN — CITALOPRAM HYDROBROMIDE 10 MG: 10 TABLET ORAL at 08:12

## 2024-11-08 RX ADMIN — GENTAMICIN SULFATE 320 MG: 40 INJECTION, SOLUTION INTRAMUSCULAR; INTRAVENOUS at 18:17

## 2024-11-08 NOTE — CONSULTS
[facility]  Consult Note - Hospitalist Service  Date of Admission:  2024  Consult Requested by: Ly Foreman MD   Reason for Consult: Patient is post-partum here with endometritis; Incidental hepatomegaly found on CT scan; GERD symptoms, normal liver function.     Assessment & Plan   Cathy Mace is a 29 year old female admitted on 2024. She has a history of recent  10/21/24 due to PPROM and breech presentation, hx of post partum depression, hypothyroidism, who is admitted to OB/GYN service for concern for post partum endometritis. Medicine was consulted for incidental finding of hepatomegaly on CT and GERD symptoms.     Abdominal pain   Concern for PP endometritis   S/p PLTCS 10/21   -Management per OB/GYN   -Currently on gentamicin and clindamycin   -Follow blood culture     Hepatomegaly   Incidental finding on CT abdomen. Liver 18 cm in length, no steatosis noted on CT. US abdomen 5/3/24 with liver measuring 16 cm at that time. LFTs wnl. Patient denies hx of alcohol use. Review medications, no likely culprits. Denies hx of hepatitis. Denies known liver disease. Discussed with hepatology, given no significant hx of alcohol use, normal LFTs, no RUQ pain, less likely acute hepatitis. Possible enlarged liver in the setting of increased volume 2/2 recent pregnancy.   -Doppler US of RUQ (will be done in the AM as patient needs to be NPO X 8 hours prior)   -Hepatitis penal (ordered for AM)   -Discussed with hepatology, can follow up with primary care provider with repeat imaging in 3-4 months    Hx of Post partum depression   -Continue PTA citalopram     GERD   Lifelong hx of reflux and sensitive gag reflex. Uses famotidine, metoclopramide, and ondansetron as needed. Patient denies any recent changes in GERD symptoms.   -Continue antinausea medications as allowed per OB while breastfeeding      The patient's care was discussed with the Attending Physician,  "Dr. Alarcon, Patient, Patient's Family, and hepatology Team.    Clinically Significant Risk Factors Present on Admission                        # Anemia: based on hgb <11       # Overweight: Estimated body mass index is 28.77 kg/m  as calculated from the following:    Height as of this encounter: 1.626 m (5' 4\").    Weight as of this encounter: 76 kg (167 lb 9.6 oz).              DENEEN Mcknight  Hospitalist Service  Securely message with [employer] info)  Text page via [facility] Paging/Directory   ______________________________________________________________________    Chief Complaint   \"I'm feeling better\"     History is obtained from the patient and review of medical records.     History of Present Illness   Cathy Mace is a 29 year old female who has a history of recent  10/21/24 due to PPROM and breech presentation, hx of post partum depression, hypothyroidism, who is admitted to OB/GYN service for concern for post partum endometritis. Medicine was consulted for incidental finding of hepatomegaly on CT and GERD symptoms. Patient reports she is feeling better today after receiving antibiotics. She denies any known hx of liver disease or known increased liver size. Patient denies any significant alcohol use. Denies any drug use or known history of hepatitis.  Denies any right upper quadrant pain.  Patient does note reflux symptoms which are chronic.  Reports lifelong sensitive gag reflex with vomiting.  Notes vomiting throughout both prior pregnancies.  Denies any blood in vomit.  Denies any diarrhea.  Notes intermittent constipation with rectal effusions for which she takes stool softener.    Patient reports having prior CT done of her abdomen in [place] within the last several years.  She reports she can obtain records for follow-up.      Past Medical History    Past Medical History:   Diagnosis Date    Hypothyroidism (acquired) 2024    Post partum depression 2024    was on Lexapro, " "weaned from 20 mg to 5 mg and ended     Rectal fissure 2024       Past Surgical History   Past Surgical History:   Procedure Laterality Date    APPENDECTOMY       SECTION N/A 10/21/2024    Procedure:  section;  Surgeon: Paulina Robertson MD;  Location:  L+D       Medications   Medications Prior to Admission   Medication Sig Dispense Refill Last Dose/Taking    acetaminophen (TYLENOL) 325 MG tablet Take 3 tablets (975 mg) by mouth every 6 hours as needed for mild pain. 100 tablet 0 2024    citalopram (CELEXA) 10 MG tablet Take 1 tablet (10 mg) by mouth daily. 30 tablet 0 2024    ibuprofen (ADVIL/MOTRIN) 800 MG tablet Take 1 tablet (800 mg) by mouth every 6 hours as needed for moderate pain. 40 tablet 1 2024    levothyroxine (SYNTHROID/LEVOTHROID) 75 MCG tablet Take 1 tablet (75 mcg) by mouth daily 90 tablet 0 2024    oxyCODONE (ROXICODONE) 5 MG tablet Take 5 mg by mouth every 6 hours as needed for severe pain.   2024    Prenatal Vit-Fe Fumarate-FA (PRENATAL PLUS) 27-1 MG TABS Take 1 tablet by mouth daily 90 tablet 2 2024    famotidine (PEPCID) 20 MG tablet Take 20 mg by mouth 2 times daily (Patient not taking: Reported on 2024)             Allergies   No Known Allergies     Physical Exam   Blood pressure 112/77, pulse 88, temperature 98.4  F (36.9  C), temperature source Oral, resp. rate 16, height 1.626 m (5' 4\"), weight 76 kg (167 lb 9.6 oz), last menstrual period 2024, SpO2 98%, currently breastfeeding.  GENERAL: Alert and oriented x 3. NAD.   HEENT: Anicteric sclera. Mucous membranes moist and without lesions.   CV: RRR. S1, S2. No murmurs appreciated.   RESPIRATORY: Effort normal on RA. Lungs CTAB with no wheezing, rales, rhonchi.   GI: Abdomen soft, and non distended, bowel sounds present. No tenderness, rebound, guarding. Negative Stark's sign.   MUSCULOSKELETAL: No joint swelling or tenderness. Moves all extremities.   NEUROLOGICAL: No " focal deficits. Strength 5/5 bilaterally in upper and lower extremities.   EXTREMITIES: No peripheral edema. Intact bilateral pedal pulses.   SKIN: No jaundice. No rashes.       Medical Decision Making       65 MINUTES SPENT BY ME on the date of service doing chart review, history, exam, documentation & further activities per the note.      Data     I have personally reviewed the following data over the past 24 hrs:    7.5  \   10.9 (L)   / 312     136 102 9.0 /  86   4.1 24 0.71 \     ALT: 35 AST: 27 AP: 106 TBILI: 0.2   ALB: 3.6 TOT PROTEIN: 7.0 LIPASE: N/A     TSH: N/A T4: N/A A1C: N/A       Imaging results reviewed over the past 24 hrs:   Recent Results (from the past 24 hours)   CT Abdomen Pelvis w Contrast    Narrative    CT ABDOMEN PELVIS W CONTRAST 2024 3:27 PM    CLINICAL HISTORY: Fever, abdominal pain after  17 days ago    TECHNIQUE: CT scan of the abdomen and pelvis was performed following  injection of IV contrast. Multiplanar reformats were obtained. Dose  reduction techniques were used.  CONTRAST: 87mL Isovue 370    COMPARISON: None.    FINDINGS:   LOWER CHEST: Subsegmental atelectasis is seen in the lung bases.    HEPATOBILIARY: Liver is enlarged measuring 18 cm in length.  Gallbladder is unremarkable.    PANCREAS: No significant mass, duct dilatation, or inflammatory  change.    SPLEEN: Normal size.    ADRENAL GLANDS: No significant nodules.    KIDNEYS/BLADDER: No significant mass, stones, or hydronephrosis.    BOWEL: No obstruction or inflammatory change.    PELVIC ORGANS: Uterus appears enlarged and heterogenous with  endometrial thickening seen measuring up to 1.8 cm. Several linear  appearing hypoattenuating lesions are noted along the lower uterine  segment measuring up to 1.6 cm (series 4, image 60). At least one of  these lesions appears to be in close proximity to the adjacent  endometrium with possible continuity measuring up to 1.3 cm (series 4,  image 60). 2.8 x 1.8 cm mixed  density lesion is seen along the left  adnexa with internal fat and soft tissue (series 2, image 73).    ADDITIONAL FINDINGS: Diastases recti is seen along the anterior pelvic  wall. Subcutaneous soft tissue thickening along the anterior pelvic  wall echo relates to scarring.    MUSCULOSKELETAL: No suspicious osseous lesions are seen.      Impression    IMPRESSION:   1.  Heterogenous, enlarged appearance of the uterus with endometrial  thickening likely in keeping with postpartum status. Although no  endometrial gas is seen, early endometritis cannot be excluded.  Several linear, hypoattenuating lesions are noted on the lower uterine  segment in the region of the expected  scar. At least one of  these lesions measuring up to 1.3 cm appears to be in continuity with  the underlying endometrium. Uterine dehiscence is in the differential  diagnosis. Postsurgical fluid collections in the area of scarring  could also appear similarly. Suggest gynecological consultation and  further characterization with pelvic ultrasound.  2.  Hepatomegaly.  3.  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 This lesion can also be  further characterized with pelvic ultrasound.    CAROLYNN MALDONADO MD         SYSTEM ID:  [id number]

## 2024-11-08 NOTE — PLAN OF CARE
Goal Outcome Evaluation:      Plan of Care Reviewed With: patient, spouse    Overall Patient Progress: improvingOverall Patient Progress: improving     Transferred from ED via zoom cart, admitted for IV antibiotics, possible endometritis.  Infant with mother at bedside,  on his way to hospital to care for baby.  VSS, afebrile.  Complaining of epigastric pain, given IV Zofran and patient encouraged to eat soft/bland food as she hasn't eaten since yesterday.  Incision DEMETRIA, healing without erythema.  Breastfeeding daughter at bedside.  Continue with POC.

## 2024-11-08 NOTE — DISCHARGE SUMMARY
[facility]   Gynecology Discharge Summary      Patient: Cathy Mace    YOB: 1995   MRN# [id number]           Date of Admission:  2024  Date of Discharge::  2024  Admitting Physician:  Yina Romero MD  Discharge Physician:  Anastasiia Rosa MD             Admission Diagnoses:   - Suspected postpartum endometritis   - GERD   - Hypothyroidism           Discharge Diagnosis:   - Same above  - Asymptomatic Hepatomegaly            Procedures Performed:      - Intravenous antibiotic     - CT A/P  - RUQ US       Admission History   Cathy Mace is a 29 year old  now POD#17 from [facility]TCS at OSH for PPROM and breech presentation, who presents with leukocytosis, increasing lower abdominal pain and concern for postpartum endometritis. Patient overall appears non toxic, with normal lactate and continues to be afebrile. CT A/P without concern for seroma or abscess with possible signs with thickened stripe c/f early endometritis with hypo attenuating lesions cannot rule out dehiscience. On review of images, more likely consistent with normal post-operative changes. Exam notable for moderate tenderness just superior to the incision. Given increase in her pain and mild leukocytosis, will plan for admission for IV antibiotics and close monitoring for suspected endometritis. Although low concern for GAS at this time, will plan for uterine aspiration for culture/gram stain if clinically worsens.            Hospital Course   On admission she was started on IV gentamcin/clindamycin. She remained hemodynamically stable and afebrile, and had significant improvement of her abdominal pain. Her leukocytosis resolved. She received a total of 24 hrs IV abx before they were discontinued. She was deemed medically appropriate for discharge on hospital day 2.     On Admission CT/A/P Hepatomegaly was observed with liver size 18 cm as an incidental finding.  Medicine was consulted and RUQ ultrasound was performed which did not show any additional masses. Hepatitis panel and mononucleosis was negative and Medicine recommended follow up with PCP and repeat  imaging in 3-4 months    Hemoglobin   Date Value Ref Range Status   2024 10.9 (L) 11.7 - 15.7 g/dL Final            Discharge Instructions and Follow-Up:     Discharge activity: - No lifting >15 lbs or strenuous exercise for 6 weeks  - No driving while taking narcotics or until you can slam on the breaks without pain   Discharge follow-up: Follow up with PCP for hepatomegaly and in S clinic in 1 week as scheduled   Return Precautions: Patient was instructed to return to ED for any of the following:    - Temperature greater than 100.4F   - Pain not controlled by pain medications   - Uncontrolled nausea/vomiting   - Foul-smelling vaginal discharge   - Vaginal bleeding soaking 1 pad per hour for 2 hours in a row   Discharge Medications:    Review of your medicines        UNREVIEWED medicines. Ask your doctor about these medicines        Dose / Directions   famotidine 20 MG tablet  Commonly known as: PEPCID      Dose: 20 mg  Take 20 mg by mouth 2 times daily  Refills: 0            CONTINUE these medicines which may have CHANGED, or have new prescriptions. If we are uncertain of the size of tablets/capsules you have at home, strength may be listed as something that might have changed.        Dose / Directions   levothyroxine 50 MCG tablet  Commonly known as: SYNTHROID/LEVOTHROID  This may have changed:   medication strength  how much to take  Used for: Hypothyroidism (acquired)      Dose: 50 mcg  Start taking on: November 10, 2024  Take 1 tablet (50 mcg) by mouth daily.  Quantity: 30 tablet  Refills: 0            CONTINUE these medicines which have NOT CHANGED        Dose / Directions   acetaminophen 325 MG tablet  Commonly known as: TYLENOL  Used for: Single delivery by  section      Dose: 975 mg  Take 3  tablets (975 mg) by mouth every 6 hours as needed for mild pain.  Quantity: 100 tablet  Refills: 0     citalopram 10 MG tablet  Commonly known as: celeXA  Used for: Postpartum depression      Dose: 10 mg  Take 1 tablet (10 mg) by mouth daily.  Quantity: 30 tablet  Refills: 0     ibuprofen 800 MG tablet  Commonly known as: ADVIL/MOTRIN  Used for: Single delivery by  section      Dose: 800 mg  Take 1 tablet (800 mg) by mouth every 6 hours as needed for moderate pain.  Quantity: 40 tablet  Refills: 1     Prenatal Plus 27-1 MG Tabs  Used for: Encounter for supervision of other normal pregnancy in first trimester      Dose: 1 tablet  Take 1 tablet by mouth daily  Quantity: 90 tablet  Refills: 2            STOP taking      oxyCODONE 5 MG tablet  Commonly known as: ROXICODONE                  Where to get your medicines        These medications were sent to [facility] [address]  [address] [address]      Phone: [phone]   levothyroxine 50 MCG tablet                Discharge Disposition:     Discharged to home      Christie Martinez MD  Obstetrics, Gynecology & Women's Health   Resident, PGY-1  2024 2:06 PM     Staff MD Note    I evaluated the patient on the day of discharge.  We reviewed discharge instructions.  Patient stable for discharge.    Anastasiia Rosa MD

## 2024-11-08 NOTE — PLAN OF CARE
Data: Vital signs within normal limits. Postpartum checks within normal limits. Patient eating and drinking normally. Patient able to empty bladder independently and is up ambulating. No apparent signs of infection. Incision open to air and healing well, no drainage. Patient performing self cares and is able to care for infant.  Action: Patient medicated during the shift for abdominal pain.  Pain was managed well with PO meds and patient was able to rest comfortably after dose of Maalox suspension. Patient education done about non-pharm alternatives.   Response: Positive attachment behaviors observed with infant. Support persons is spouse and he is present at bedside.   Plan: Continue with education and plan of care.    Goal Outcome Evaluation:  Problem: Infection  Goal: Absence of Infection Signs and Symptoms  11/8/2024 0554 by Yina Drummond RN  Outcome: Progressing  11/8/2024 0554 by Yina Drummond RN  Outcome: Not Progressing     Problem: Adult Inpatient Plan of Care  Goal: Optimal Comfort and Wellbeing  11/8/2024 0554 by Yina Drummond RN  Outcome: Progressing  11/8/2024 0554 by Yina Drummond RN  Outcome: Not Progressing

## 2024-11-08 NOTE — PLAN OF CARE
"  Problem: Adult Inpatient Plan of Care  Goal: Patient-Specific Goal (Individualized)  Description: You can add care plan individualizations to a care plan. Examples of Individualization might be:  \"Parent requests to be called daily at 9am for status\", \"I have a hard time hearing out of my right ear\", or \"Do not touch me to wake me up as it startles  me\".  Outcome: Progressing     Problem: Adult Inpatient Plan of Care  Goal: Absence of Hospital-Acquired Illness or Injury  Intervention: Prevent Infection  Recent Flowsheet Documentation  Taken 11/8/2024 0815 by John Lebron RN  Infection Prevention: hand hygiene promoted   Goal Outcome Evaluation:      Plan of Care Reviewed With: patient    Overall Patient Progress: improvingOverall Patient Progress: improving  Patient alert and oriented times four, lungs sound clear, passing gas and had a bowel movement this morning. Pain improving. Incision healing well. No sign of infection noted.   Action: Offered pain medication but patient declined. IV line flushed before and after administering antibiotics. Cares explained and education done. Incentive spirometer given and patient demonstrated well. Pneumatic boots on while in bed. Intake and output monitoring done.   Response: Up ambulating in room and to bathroom independently. Caring for infant. Tolerating regular diet and no complain of nausea. Less abdominal discomfort reported.   Plan: Will continue to administer antibiotics and do vitals every 3 hours.     "

## 2024-11-08 NOTE — PROGRESS NOTES
Gynecology Progress Note    Subjective:  Patient is resting comfortably in bed. Reports her pain is improved with antibiotics. She denies any more chills or fevers overnight. No more nausea or vomiting.   Voiding spontaneously . Passing flatus/BM .  Ambulating without dizziness or difficulty.  She denies any chest pain, shortness of breath, or other systemic complaints.    Objective:  Vitals:    24 0850 24 1155 24 1642 24   BP:  112/77 113/78 108/71   BP Location:  Left arm  Left arm   Patient Position:       Cuff Size:  Adult Regular Adult Regular    Pulse:  88 91 79   Resp:  16 16 17   Temp:  98.4  F (36.9  C) 98.4  F (36.9  C) 97.9  F (36.6  C)   TempSrc:  Oral Oral Oral   SpO2:       Weight: 76 kg (167 lb 9.6 oz)      Height:           General:  A&Ox3. NAD. Resting in bed  CV: Well perfused   Pulm: . Normal respiratory effort.  Abd: Soft, non-distended. Mild tenderness across lower abdomen. Incision intact   Ext: Trace edema    Assessment/Plan:   Cathy Mace is a 29 year old  now POD#18 from PLTCS at OSH for PPROM and breech presentation, who presents with leukocytosis, increasing lower abdominal pain and concern for postpartum endometritis. Now on IV antibiotics with improvement in her symptoms. Patient with hepatomegaly on CT scan with mild GERD symptoms and normal LFTs. Will plan to consult medicine today. She also was found to have low TSH in the setting of hypothyroidism on her Synthroid.      #Abdominal Pain  #C/f PP Endometritis   # s/p PLTCS 10/21  Patient with new onset pain after CS with leukocytosis and generalized malaise. Afebrile with normal vitals. Low concern for sepsis. Improving today.   - WBC downtrended to normal, Lactate normal  - D#2 Gent/Clindamycin for 24 hours  - Pelvic ultrasound and/or uterine aspiration if clinically not improving or worsening.   - Monitor vitals q4hrs     #History of PP Depression  - PTA Citalopram   - Will continue to monitor  mood  - Consider  consult     #Hypothyroidism   - TSH 2.27 (10/7)>0.15 (11/7); T4 normal  - Decrease Synthroid to 50mcg     #Hepatomegaly   Unknown etiology at this time. 18cm in length. Patient with GERD symptoms. Liver function normal.   - Consider [facility] Medicine this AM     #PP  - Routine PP cares      Ly Foreman MD  OB/GYN Resident, PGY-4    I personally examined and evaluated Cathy Mace on 11/8/2024.  I discussed the patient with Dr. Foreman and agree with the presentation, exam and plan of care documented in this note with edits by me. Cathy is overall improving. Pain is decreasing. Breastfeeding well with baby at bedside. Plan to stop IV abx tonight and observe through tomorrow morning. Decrease synthroid to 50mcg and continue with recheck TSH at 6 weeks postpartum.   Caprice Molina MD

## 2024-11-09 ENCOUNTER — APPOINTMENT (OUTPATIENT)
Dept: ULTRASOUND IMAGING | Facility: CLINIC | Age: 29
DRG: 776 | End: 2024-11-09
Attending: PHYSICIAN ASSISTANT
Payer: COMMERCIAL

## 2024-11-09 VITALS
TEMPERATURE: 98.5 F | RESPIRATION RATE: 17 BRPM | OXYGEN SATURATION: 98 % | HEIGHT: 64 IN | DIASTOLIC BLOOD PRESSURE: 79 MMHG | HEART RATE: 85 BPM | WEIGHT: 166.67 LBS | SYSTOLIC BLOOD PRESSURE: 112 MMHG | BODY MASS INDEX: 28.45 KG/M2

## 2024-11-09 LAB
GLUCOSE BLDC GLUCOMTR-MCNC: 95 MG/DL (ref 70–99)
HAV IGM SERPL QL IA: NONREACTIVE
HBV CORE IGM SERPL QL IA: NONREACTIVE
HBV SURFACE AG SERPL QL IA: NONREACTIVE
HCV AB SERPL QL IA: NONREACTIVE
HOLD SPECIMEN: NORMAL
MONOCYTES NFR BLD AUTO: NEGATIVE %

## 2024-11-09 PROCEDURE — 93976 VASCULAR STUDY: CPT

## 2024-11-09 PROCEDURE — 258N000003 HC RX IP 258 OP 636

## 2024-11-09 PROCEDURE — 250N000013 HC RX MED GY IP 250 OP 250 PS 637: Performed by: STUDENT IN AN ORGANIZED HEALTH CARE EDUCATION/TRAINING PROGRAM

## 2024-11-09 PROCEDURE — 76705 ECHO EXAM OF ABDOMEN: CPT | Mod: 26 | Performed by: STUDENT IN AN ORGANIZED HEALTH CARE EDUCATION/TRAINING PROGRAM

## 2024-11-09 PROCEDURE — 36415 COLL VENOUS BLD VENIPUNCTURE: CPT | Performed by: PHYSICIAN ASSISTANT

## 2024-11-09 PROCEDURE — 86308 HETEROPHILE ANTIBODY SCREEN: CPT | Performed by: INTERNAL MEDICINE

## 2024-11-09 PROCEDURE — 250N000013 HC RX MED GY IP 250 OP 250 PS 637

## 2024-11-09 PROCEDURE — 99232 SBSQ HOSP IP/OBS MODERATE 35: CPT | Performed by: INTERNAL MEDICINE

## 2024-11-09 PROCEDURE — 93976 VASCULAR STUDY: CPT | Mod: 26 | Performed by: STUDENT IN AN ORGANIZED HEALTH CARE EDUCATION/TRAINING PROGRAM

## 2024-11-09 PROCEDURE — 86803 HEPATITIS C AB TEST: CPT | Performed by: PHYSICIAN ASSISTANT

## 2024-11-09 RX ORDER — PRENATAL VIT/IRON FUM/FOLIC AC 27MG-0.8MG
1 TABLET ORAL DAILY
Qty: 90 TABLET | Refills: 3 | Status: CANCELLED | OUTPATIENT
Start: 2024-11-10

## 2024-11-09 RX ORDER — LEVOTHYROXINE SODIUM 50 UG/1
50 TABLET ORAL DAILY
Qty: 30 TABLET | Refills: 0 | Status: SHIPPED | OUTPATIENT
Start: 2024-11-10 | End: 2024-11-09

## 2024-11-09 RX ORDER — LEVOTHYROXINE SODIUM 50 UG/1
50 TABLET ORAL DAILY
Qty: 30 TABLET | Refills: 0 | Status: SHIPPED | OUTPATIENT
Start: 2024-11-10

## 2024-11-09 RX ADMIN — ACETAMINOPHEN 650 MG: 325 TABLET, FILM COATED ORAL at 04:09

## 2024-11-09 RX ADMIN — CITALOPRAM HYDROBROMIDE 10 MG: 10 TABLET ORAL at 10:22

## 2024-11-09 RX ADMIN — SODIUM CHLORIDE, POTASSIUM CHLORIDE, SODIUM LACTATE AND CALCIUM CHLORIDE: 600; 310; 30; 20 INJECTION, SOLUTION INTRAVENOUS at 00:46

## 2024-11-09 RX ADMIN — CALCIUM CARBONATE 1000 MG: 500 TABLET, CHEWABLE ORAL at 00:27

## 2024-11-09 RX ADMIN — POLYETHYLENE GLYCOL 3350 17 G: 17 POWDER, FOR SOLUTION ORAL at 10:22

## 2024-11-09 RX ADMIN — PRENATAL VIT W/ FE FUMARATE-FA TAB 27-0.8 MG 1 TABLET: 27-0.8 TAB at 10:22

## 2024-11-09 RX ADMIN — LEVOTHYROXINE SODIUM 50 MCG: 50 TABLET ORAL at 10:22

## 2024-11-09 ASSESSMENT — ACTIVITIES OF DAILY LIVING (ADL)
ADLS_ACUITY_SCORE: 0

## 2024-11-09 NOTE — PROGRESS NOTES
Gynecology Progress Note    Subjective:  Patient is feeling overall well *** . Her pain has significantly improved since starting IV antibiotics. Denies fever/chills, nausea/vomiting, or dyspnea overnight.vomiting. Continues to ambulate ad jonas without dizziness/lightheadedness. Voiding spontaneously and having bowel movements***. ***No additional concerns this morning.     Objective:  Vitals:    24 1155 24 1642 24 2006 24 0019   BP: 112/77 113/78 108/71 105/63   BP Location: Left arm  Left arm Left arm   Patient Position:    Sitting   Cuff Size: Adult Regular Adult Regular  Adult Regular   Pulse: 88 91 79 63   Resp: 16 16 17 16   Temp: 98.4  F (36.9  C) 98.4  F (36.9  C) 97.9  F (36.6  C) 98  F (36.7  C)   TempSrc: Oral Oral Oral Oral   SpO2:       Weight:       Height:           General:  A&Ox3. NAD. Resting in bed  CV: Well perfused   Pulm: . Normal respiratory effort.  Abd: Soft, non-distended. Mild tenderness across lower abdomen. Incision intact   Ext: Trace edema    Assessment/Plan:   Cathy Mace is a 29 year old  now POD#19 from PLTCS at OSH for PPROM and breech presentation, who presented with leukocytosis, increasing lower abdominal pain and concern for postpartum endometritis. She is now s/p 2 days of IV antibiotics with clinical improvement/resolution of her abdominal pain. Since antibiotics were discontinued she has remained afebrile and hemodynamically stable***.  Incidentally found to have hepatomegaly on CT scan, LFTs normal. Medicine was consulted yesterday with recommendation for RUQUS, for which she has been NPO since midnight.      #Abdominal Pain  #C/f PP Endometritis   # s/p PLTCS 10/21  Patient with new onset pain after CS with leukocytosis and generalized malaise. Afebrile with normal vitals. Low concern for sepsis, clinically have appropriate source control.   - WBC downtrended to normal, Lactate normal  - s/p 2D Gent/Clindamycin for 24 hours  - Pelvic ultrasound  and/or uterine aspiration PRN if clinically worsening   - Monitor vitals q4hrs     #History of PP Depression  - PTA Citalopram   - Will continue to monitor mood  - Consider SW consult     #Hypothyroidism   - TSH 2.27 (10/7)>0.15 (11/7); T4 normal  - Decrease Synthroid to 50mcg     #Hepatomegaly   Unknown etiology at this time. 18cm in length. Patient with GERD symptoms. Liver function normal.   - S/p Medicine consult 11/8  - NPO since midnight   - RUQ US this AM      #PP  - Routine PP cares      Ken Arias MD   Obstetrics & Gynecology, PGY-2  11/09/2024 4:13 AM

## 2024-11-09 NOTE — PROVIDER NOTIFICATION
11/09/24 0849   Provider Notification   Provider Name/Title Anastasiia Florence   Method of Notification Phone   Request Evaluate-Remote   Notification Reason Vital Signs Change  (BP were 81/57 and 83/48. denies dizziness or lightheadedness. LR infusion at 100cc/hr, NPO since 1130pm lastnight.)

## 2024-11-09 NOTE — PROGRESS NOTES
Gynecology Progress Note    Subjective:  Patient is resting comfortably in bed. With children. She  feels much better today. Reports her pain is improved from yesterday and is very mild. Denies fevers/chills/abnormal discharge.   No more nausea or vomiting.   Voiding spontaneously . Passing flatus/BM .  Ambulating without dizziness or difficulty.  She denies any chest pain, shortness of breath, or other systemic complaints.    Objective:  Vitals:    24 0416 24 0834 24 0836 24 0846   BP:  (!) 81/57 (!) 83/48 94/68   BP Location:       Patient Position:       Cuff Size:  Adult Regular     Pulse:  63     Resp:  16     Temp:  98.8  F (37.1  C)     TempSrc:       SpO2:       Weight: 75.6 kg (166 lb 10.7 oz)      Height:           General:  A&Ox3. NAD. Resting in bed  CV: Well perfused   Pulm: . Normal respiratory effort.  Abd: Soft, non-distended. Mild tenderness to palpation across lower abdomen.  No guarding. Incision intact   Ext: Trace edema    Results for orders placed or performed during the hospital encounter of 24   CT Abdomen Pelvis w Contrast    Impression    IMPRESSION:   1.  Heterogenous, enlarged appearance of the uterus with endometrial  thickening likely in keeping with postpartum status. Although no  endometrial gas is seen, early endometritis cannot be excluded.  Several linear, hypoattenuating lesions are noted on the lower uterine  segment in the region of the expected  scar. At least one of  these lesions measuring up to 1.3 cm appears to be in continuity with  the underlying endometrium. Uterine dehiscence is in the differential  diagnosis. Postsurgical fluid collections in the area of scarring  could also appear similarly. Suggest gynecological consultation and  further characterization with pelvic ultrasound.  2.  Hepatomegaly.  3.  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 This lesion can  also be  further characterized with pelvic ultrasound.    CAROLYNN MALDONADO MD         SYSTEM ID:  [id number]   US Abdomen Limited w Abdomen Doppler Limited    Impression    Impression:   Hepatomegaly. Otherwise, normal abdominal ultrasound with Doppler  assessment.    I have personally reviewed the examination and initial interpretation  and I agree with the findings.    EDIL DO MARCO A         SYSTEM ID:  [id number]         Assessment/Plan:   Cathy Mace is a 29 year old  who is now POD#19 from [facility]TCS at OSH for PPROM and breech presentation, who presented with leukocytosis, increasing lower abdominal pain and concern for postpartum endometritis. She is s/p gentamicin and clindamycin. Her leukocytosis has resolved. Upon admission she had hepatomegaly on CT scan with mild GERD symptoms and normal LFTs. Consulted medicine who recommended RUQ ultrasound  She also was found to have low TSH in the setting of hypothyroidism on her Synthroid.      #Abdominal Pain  #C/f PP Endometritis   # s/p PLTCS 10/21  Patient with new onset pain after CS with leukocytosis and generalized malaise. Afebrile with normal vitals. Low concern for sepsis. Improving today.   - WBC have continued to downtrend. 11.5>7.5  - S/P gent/clinda  - Clinically improving abdominal pain  - Pelvic ultrasound and/or uterine aspiration if clinically not improving or worsening.   - Monitor vitals q4hrs     #History of PP Depression  - PTA Citalopram   - Will continue to monitor mood  - Consider SW consult     #Hypothyroidism   - TSH 2.27 (10/7)>0.15 (); T4 normal  - Decrease Synthroid to 50mcg     #Hepatomegaly   - s/p RUQ ultrasound demonstrated hepatomegaly otherwise unremarkable  - Hepatitis labs WNL  - Mononucleosis labs WNL  - Additional recommendations per hospitalist team      #PP  - Routine PP cares        Dispo: Stable to discharge today      Christie Maritnez MD  Obstetrics, Gynecology & Women's Health   Resident, PGY-1  2024 12:37 PM    Staff  MD Note    I appreciate the note by Dr. Martinez.  Any necessary changes have been made by me.  I saw and evaluated the patient and agree with the findings and plan of care as documented in the note.  Patient feeling better and hemodynamically stable.  RUQ US with hepatomegaly, no other concerns.  Recommendation for followup with PCP and repeat imaging in 3-4 months.  Plan discharge home today, return precautions discussed.  Has followup in [facility] clinic on 11/15/24.    Anastasiia Rosa MD

## 2024-11-09 NOTE — PLAN OF CARE
Goal Outcome Evaluation:           Overall Patient Progress: improvingOverall Patient Progress: improving  Discharge instructions read and explained to parent, Resident sent script for Synthroid to her pharmacy as per request. IV removed prior to leaving floor. Patient discharged home in as stable condition.

## 2024-11-09 NOTE — PLAN OF CARE
Goal Outcome Evaluation:      Plan of Care Reviewed With: patient    Overall Patient Progress: improvingOverall Patient Progress: improving     Patient alert and oriented times four, lungs sound clear, passing gas and had a bowel movement, abdominal pain improving. Incision open to air and healing well. Was NPO before ultrasound. Ultrasound done and diet changed to regular. Had two low BPs and MD updated on findings. IV saline locked and  patient drinking well. Denied dizziness, lightheadedness and SOB, ambulating in room and bathroom independently. Will continue to monitor patient.

## 2024-11-09 NOTE — PROGRESS NOTES
[facility]    Medicine Progress Note - Hospitalist Service, GOLD TEAM 17    Date of Admission:  2024    Assessment & Plan     Cathy Mace is a 29 year old female admitted on 2024. She has a history of recent  10/21/24 due to PPROM and breech presentation, hx of post partum depression, hypothyroidism, who is admitted to OB/GYN service for concern for post partum endometritis. Medicine was consulted for incidental finding of hepatomegaly on CT and GERD symptoms.      Abdominal pain   Concern for PP endometritis   S/p PLTCS 10/21   -Management per OB/GYN   -Currently on gentamicin and clindamycin   -Follow blood culture      Hepatomegaly   -Incidental finding on CT abdomen. Liver 18 cm in length, no steatosis noted on CT. US abdomen 5/3/24 with liver measuring 16 cm at that time. LFTs wnl. Patient denies hx of alcohol use. Review medications, no likely culprits. Denies hx of hepatitis. Denies known liver disease. Discussed with hepatology, given no significant hx of alcohol use, normal LFTs, no RUQ pain, less likely acute hepatitis. Possible enlarged liver in the setting of increased volume 2/2 recent pregnancy, hepatic congestion .   - spleen normal size   -Doppler US  shows normal doppler ,  patent splenic and hepatic veins    -Hepatitis penal  negative , mono negative as well   - consulting IM team Discussed with hepatology, can follow up with primary care provider with repeat imaging in 3-4 months       Left adnexal lesion  - defer back to OB/GYN     Hypothyroidism  - TSH low at 0.15,  levothyroxine dose reduced, follow up  TSH in 3-4 weeks       Hx of Post partum depression   -Continue PTA citalopram      GERD   Lifelong hx of reflux and sensitive gag reflex. Uses famotidine, metoclopramide, and ondansetron as needed. Patient denies any recent changes in GERD symptoms.   -Continue antinausea medications as allowed per OB while breastfeeding        "  Will sign off, please do not hesitate to contact us with any further questions or  concerns         Diet: Regular Diet Adult  Diet    DVT Prophylaxis: Defer to primary service  Garcia Catheter: Not present  Lines: None     Cardiac Monitoring: None  Code Status: Full Code      Clinically Significant Risk Factors                              # Overweight: Estimated body mass index is 28.61 kg/m  as calculated from the following:    Height as of this encounter: 1.626 m (5' 4\").    Weight as of this encounter: 75.6 kg (166 lb 10.7 oz)., PRESENT ON ADMISSION            Disposition Plan     Medically Ready for Discharge: per OB/GYN              Gris Millard MD  Hospitalist Service, GOLD TEAM 17  [facility]  Securely message with [facility] (more info)  Text page via [employer] Paging/Directory   See signed in provider for up to date coverage information  ______________________________________________________________________    Interval History   She is feeling better today, some pain in lower abdomen, no right upper quadrant pain , no nausea vomiting   Physical Exam   Vital Signs: Temp: 98.4  F (36.9  C) Temp src: Oral BP: 110/75 Pulse: 74   Resp: 16        Weight: 166 lbs 10.68 oz  General appearence: awake alert  in  no apparent distress    RESPIRATORY: lungs clear    CARDIOVASCULAR:S1 S2 regular rate and rhythm, no rubs gallops or murmurs appreciated  GASTROINTESTINAL:soft, non-distended ,  incisional tenderness no ruq tenderness , + bowel sounds,   SKIN: warm and dry, no mottling noted   NEUROLOGIC; awake alert and oriented, no focal deficits found  EXTREMITIES: no clubbing, cyanosis or edema , moves all extremity,       Data     I have personally reviewed the following data over the past 24 hrs:    7.5  \   10.9 (L)   / 312     136 102 9.0 /  86   4.1 24 0.71 \     ALT: 35 AST: 27 AP: 106 TBILI: 0.2   ALB: 3.6 TOT PROTEIN: 7.0 LIPASE: N/A     TSH: N/A T4: N/A A1C: N/A     "   Imaging results reviewed over the past 24 hrs:   No results found for this or any previous visit (from the past 24 hours).  Recent Labs   Lab 11/08/24  0755 11/07/24  1232   WBC 7.5 11.2*   HGB 10.9* 11.9   MCV 89 87    296    136   POTASSIUM 4.1 3.8   CHLORIDE 102 99   CO2 24 23   BUN 9.0 9.4   CR 0.71 0.57   ANIONGAP 10 14   DESIREE 9.1 9.9   GLC 86 89   ALBUMIN 3.6 4.0   PROTTOTAL 7.0 7.7   BILITOTAL 0.2 0.2   ALKPHOS 106 123   ALT 35 42   AST 27 34   LIPASE  --  20

## 2024-11-09 NOTE — PLAN OF CARE
Goal Outcome Evaluation:      Plan of Care Reviewed With: patient    Overall Patient Progress: improvingOverall Patient Progress: improving    Outcome Evaluation: Postpartum readmit for endometritis finished her antibiotics the evening of 11/8/24. Plan for liver panel labs in the morning and RUQ abdominal US due to incidental findings on abdominal CT. NPO started at 23:30. IV site leaking, discontinued. IV site started on right arm for overnight IV fluids. Vital signs stable, mild lower abdominal pain relieved with acetaminophen and ibuprofen. Up ad jonas, voiding spontaneously without difficulties. Breastfeeding infant in room accompanied by spouse and toddler. Plan of care reviewed with patient; she states understanding and agrees to plan of care.      Problem: Adult Inpatient Plan of Care  Goal: Plan of Care Review  Description: The Plan of Care Review/Shift note should be completed every shift.  The Outcome Evaluation is a brief statement about your assessment that the patient is improving, declining, or no change.  This information will be displayed automatically on your shift  note.  Outcome: Progressing  Flowsheets (Taken 11/9/2024 0305)  Outcome Evaluation:   Postpartum readmit for endometritis finished her antibiotics the evening of 11/8/24. Plan for liver panel labs in the morning and RUQ abdominal US due to incidental findings on abdominal CT. NPO started at 23:30. IV site leaking, discontinued. IV site started on right arm for overnight IV fluids. Vital signs stable, mild lower abdominal pain relieved with acetaminophen and ibuprofen. Up ad jonas, voiding spontaneously without difficulties. Breastfeeding infant in room accompanied by spouse and toddler. Plan of care reviewed with patient   she states understanding and agrees to plan of care.  Plan of Care Reviewed With: patient  Overall Patient Progress: improving  Goal: Patient-Specific Goal (Individualized)  Description: You can add care plan  "individualizations to a care plan. Examples of Individualization might be:  \"Parent requests to be called daily at 9am for status\", \"I have a hard time hearing out of my right ear\", or \"Do not touch me to wake me up as it startles  me\".  Outcome: Progressing  Flowsheets (Taken 11/9/2024 0305)  Individualized Care Needs: Offer pain medication when available, allow infant to room in for breastfeeding  Anxieties, Fears or Concerns: pain control, fever control  Patient/Family-Specific Goals (Include Timeframe): Pain level 2 out of 10 or less after interventions prior to discharge.  Goal: Absence of Hospital-Acquired Illness or Injury  Outcome: Progressing  Intervention: Prevent Skin Injury  Recent Flowsheet Documentation  Taken 11/9/2024 0019 by Linda Bravo RN  Body Position: position changed independently  Taken 11/8/2024 2045 by Linda Bravo RN  Body Position: position changed independently  Intervention: Prevent and Manage VTE (Venous Thromboembolism) Risk  Recent Flowsheet Documentation  Taken 11/9/2024 0019 by Linda Bravo RN  VTE Prevention/Management: SCDs off (sequential compression devices)  Taken 11/8/2024 2045 by Linda Bravo RN  VTE Prevention/Management: SCDs off (sequential compression devices)  Intervention: Prevent Infection  Recent Flowsheet Documentation  Taken 11/9/2024 0019 by Linda Bravo RN  Infection Prevention:   environmental surveillance performed   equipment surfaces disinfected   hand hygiene promoted   personal protective equipment utilized   rest/sleep promoted   single patient room provided   visitors restricted/screened  Taken 11/8/2024 2045 by Linda Bravo RN  Infection Prevention:   environmental surveillance performed   equipment surfaces disinfected   hand hygiene promoted   personal protective equipment utilized   rest/sleep promoted   single patient room provided   visitors restricted/screened  Goal: Optimal Comfort and Wellbeing  Outcome: " Progressing  Intervention: Provide Person-Centered Care  Recent Flowsheet Documentation  Taken 11/9/2024 0019 by Linda Bravo, RN  Trust Relationship/Rapport:   care explained   choices provided   emotional support provided   empathic listening provided   questions answered   questions encouraged   reassurance provided   thoughts/feelings acknowledged  Taken 11/8/2024 2045 by Linda Bravo, RN  Trust Relationship/Rapport:   care explained   choices provided   emotional support provided   empathic listening provided   questions answered   questions encouraged   reassurance provided   thoughts/feelings acknowledged  Goal: Readiness for Transition of Care  Outcome: Progressing     Problem: Infection  Goal: Absence of Infection Signs and Symptoms  Outcome: Progressing       Linda Bravo RN on 11/9/2024 at 3:12 AM

## 2024-11-11 ENCOUNTER — TELEPHONE (OUTPATIENT)
Dept: FAMILY MEDICINE | Facility: CLINIC | Age: 29
End: 2024-11-11

## 2024-11-11 ENCOUNTER — MYC MEDICAL ADVICE (OUTPATIENT)
Dept: FAMILY MEDICINE | Facility: CLINIC | Age: 29
End: 2024-11-11

## 2024-11-11 NOTE — TELEPHONE ENCOUNTER
Post Hospitalization Follow Up    SW called pt to discuss recent discharge (11/9) from [facility].  SW left voicemail asking for a return call and encouraging a follow up visit with her PCP (Dr. Andres).     CARMITA will also reach out via [employer].    ABHI Richard, LGSW  [facility]

## 2024-11-11 NOTE — TELEPHONE ENCOUNTER
Pt returned SW call.  Pt stated she is doing well since being home from the hospital.  She has no question or concerns and knows who to call should that change.     Pt stated she has an appt with Dr. Kimberly Chavarria (Women's Health) on 11/15.     ABHI Richard, [facility]

## 2024-11-12 LAB — BACTERIA BLD CULT: NO GROWTH

## 2024-12-12 RX ORDER — CITALOPRAM HYDROBROMIDE 10 MG/1
10 TABLET ORAL DAILY
Qty: 30 TABLET | Refills: 0 | Status: SHIPPED | OUTPATIENT
Start: 2024-12-12

## 2024-12-12 NOTE — TELEPHONE ENCOUNTER
Received refill request for citalopram. Pt last seen 11/4 with plan to reassess effective ness at 11/15 PP extended visit. Pt canceled multiple PP appointments, has one scheduled for 12/23. Refilled one time to last until this appt.

## 2024-12-22 ASSESSMENT — EDINBURGH POSTNATAL DEPRESSION SCALE (EPDS)
I HAVE BEEN SO UNHAPPY THAT I HAVE HAD DIFFICULTY SLEEPING: NOT AT ALL
I HAVE BEEN SO UNHAPPY THAT I HAVE BEEN CRYING: NO, NEVER
I HAVE BLAMED MYSELF UNNECESSARILY WHEN THINGS WENT WRONG: NO, NEVER
I HAVE FELT SAD OR MISERABLE: NO, NOT AT ALL
THINGS HAVE BEEN GETTING ON TOP OF ME: NO, I HAVE BEEN COPING AS WELL AS EVER
I HAVE LOOKED FORWARD WITH ENJOYMENT TO THINGS: AS MUCH AS I EVER DID
I HAVE FELT SCARED OR PANICKY FOR NO GOOD REASON: NO, NOT AT ALL
TOTAL SCORE: 0
I HAVE BEEN ANXIOUS OR WORRIED FOR NO GOOD REASON: NO, NOT AT ALL
THE THOUGHT OF HARMING MYSELF HAS OCCURRED TO ME: NEVER
I HAVE BEEN ABLE TO LAUGH AND SEE THE FUNNY SIDE OF THINGS: AS MUCH AS I ALWAYS COULD

## 2024-12-23 ENCOUNTER — PRENATAL OFFICE VISIT (OUTPATIENT)
Dept: OBGYN | Facility: CLINIC | Age: 29
End: 2024-12-23
Attending: ADVANCED PRACTICE MIDWIFE
Payer: COMMERCIAL

## 2024-12-23 VITALS — SYSTOLIC BLOOD PRESSURE: 104 MMHG | HEART RATE: 96 BPM | DIASTOLIC BLOOD PRESSURE: 70 MMHG

## 2024-12-23 DIAGNOSIS — K64.4 EXTERNAL HEMORRHOIDS: ICD-10-CM

## 2024-12-23 DIAGNOSIS — K60.2 RECTAL FISSURE: ICD-10-CM

## 2024-12-23 DIAGNOSIS — E03.9 HYPOTHYROIDISM (ACQUIRED): ICD-10-CM

## 2024-12-23 PROCEDURE — 99213 OFFICE O/P EST LOW 20 MIN: CPT | Performed by: ADVANCED PRACTICE MIDWIFE

## 2024-12-23 NOTE — PATIENT INSTRUCTIONS
Thank you for trusting us with your care!   Please be aware, if you are on Mychart, you may see your results prior to your providers review. If labs are abnormal, we will call or message you on [employer] with a follow up plan.    If you need to contact us for questions about:  Symptoms, Scheduling & Medical Questions; Non-urgent (2-3 day response) [employer] message, Urgent (needing response today) [phone] (if after 3:30pm next day response)   Prescriptions: Please call your Pharmacy   Billing: Chris [phone] or [facility]:[phone]

## 2024-12-23 NOTE — PROGRESS NOTES
Nursing Notes:   Smith Sera  2024  2:49 PM  Addendum  Chief Complaint   Patient presents with    Postpartum Care     6 wk PP     SUBJECTIVE:   Cathy Mace is here for her 6-week postpartum checkup.     EPDS score: 0    Delivery Date: 10/21/2024  Delivering provider:  Paulina Robertson MD  Type of delivery:  .     Delivery complications: None  Infant gender:  girl, weight 5 lbs 15 oz  Feeding Method:  .  Complications reported with feeding:  none, infant thriving .    Bleeding:  Moderate.  Duration:  5 weeks.  Menses resumed:  No  Bowel/Urinary problems:  No    Contraception Planned:  not right now  She  has not had intercourse since delivery..        ================================================================  ROS: 10 point ROS neg other than the symptoms noted above in the HPI.       EXAM:  /70   Pulse 96   LMP 2024 (Exact Date)   Breastfeeding Yes     General: healthy, alert, and no distress  Psych: NEGATIVE  Last PHQ-9 score on record= No Value exists for the : HP#PHQ9  Abdomen: Benign, Soft, flat, non-tender, No masses, organomegaly, and Diastasis less than 1-2 FB  Incision:  well healed     Plans condoms    Hx of hemorrhoids and small fissure. Has seen GI and understand surgical options. Would like some hydrocortisone/lidocaine cream for occasion use, knows limit use is recommended.  Will reach out to GI if fissures worse.    ASSESSMENT:   Encounter Diagnoses   Name Primary?    Postpartum depression     Hypothyroidism (acquired)     Encounter for supervision of other normal pregnancy in first trimester       Normal postpartum exam after c/s for breech or transverse  Pregnancy was complicated by:  none.      PLAN:  No orders of the defined types were placed in this encounter.     No orders of the defined types were placed in this encounter.       Risks and benefits of prescribed medications discussed.  Medication instructions reviewed.  Contraception methods  discussed  Discussed calcium intake, vitamins and supplements including Vitamin D  Exercise encouraged  Pap due, needs THS repeat in a month  Will return in 1 month for annual with pap, TSH labs, check hemorrhoids.telly Choi, DNP, APRN, CNM, FACNM

## 2024-12-23 NOTE — NURSING NOTE
Chief Complaint   Patient presents with    Postpartum Care     6 wk PP     SUBJECTIVE:   Cathy Mace is here for her 6-week postpartum checkup.     EPDS score: 0    Delivery Date: 10/21/2024  Delivering provider:  Paulina Robertson MD  Type of delivery:  .     Delivery complications: None  Infant gender:  girl, weight 5 lbs 15 oz  Feeding Method:  .  Complications reported with feeding:  none, infant thriving .    Bleeding:  Moderate.  Duration:  5 weeks.  Menses resumed:  No  Bowel/Urinary problems:  No    Contraception Planned:  not right now  She  has not had intercourse since delivery..

## 2024-12-24 RX ORDER — LEVOTHYROXINE SODIUM 50 UG/1
50 TABLET ORAL DAILY
Qty: 30 TABLET | Refills: 0 | Status: SHIPPED | OUTPATIENT
Start: 2024-12-24

## 2024-12-24 RX ORDER — CITALOPRAM HYDROBROMIDE 10 MG/1
10 TABLET ORAL DAILY
Qty: 30 TABLET | Refills: 0 | Status: SHIPPED | OUTPATIENT
Start: 2024-12-24

## 2024-12-24 RX ORDER — VITAMIN A ACETATE, BETA CAROTENE, ASCORBIC ACID, CHOLECALCIFEROL, .ALPHA.-TOCOPHEROL ACETATE, DL-, THIAMINE MONONITRATE, RIBOFLAVIN, NIACINAMIDE, PYRIDOXINE HYDROCHLORIDE, FOLIC ACID, CYANOCOBALAMIN, CALCIUM CARBONATE, FERROUS FUMARATE, ZINC OXIDE, CUPRIC OXIDE 3080; 12; 120; 400; 1; 1.84; 3; 20; 22; 920; 25; 200; 27; 10; 2 [IU]/1; UG/1; MG/1; [IU]/1; MG/1; MG/1; MG/1; MG/1; MG/1; [IU]/1; MG/1; MG/1; MG/1; MG/1; MG/1
1 TABLET, FILM COATED ORAL DAILY
Qty: 90 TABLET | Refills: 2 | Status: SHIPPED | OUTPATIENT
Start: 2024-12-24

## 2024-12-26 ENCOUNTER — TELEPHONE (OUTPATIENT)
Dept: OBGYN | Facility: CLINIC | Age: 29
End: 2024-12-26
Payer: COMMERCIAL

## 2024-12-26 DIAGNOSIS — K64.4 EXTERNAL HEMORRHOIDS: Primary | ICD-10-CM

## 2024-12-26 RX ORDER — LIDOCAINE HYDROCHLORIDE AND HYDROCORTISONE ACETATE 30; 5 MG/G; MG/G
1 CREAM RECTAL
Qty: 28.3 G | Refills: 1 | Status: SHIPPED | OUTPATIENT
Start: 2024-12-26

## 2024-12-26 NOTE — TELEPHONE ENCOUNTER
Received prescriber message about Cathy's Lidocaine-Hydrocortisone Ace 1-1 % CREAM prescribed for hemorrhoids.    They only have lidocaine 3% hydrocortisone 0.5% available -- pended to CNM team.

## 2025-01-13 PROBLEM — M25.531 RIGHT WRIST PAIN: Status: RESOLVED | Noted: 2024-05-22 | Resolved: 2025-01-13

## 2025-01-22 ENCOUNTER — TELEPHONE (OUTPATIENT)
Dept: OBGYN | Facility: CLINIC | Age: 30
End: 2025-01-22
Payer: COMMERCIAL

## 2025-01-22 NOTE — TELEPHONE ENCOUNTER
"From pt CRM request in  box:   \"I have attached the document (only need to sign PART 5 by the Physician) to this email with the Itemized bill from 10/21/2024 and 11/07/2024. Kindly sign the itemized bill documents as well. Also, even though its says that the doctor needs to be in jurisdiction of [place], I have spoken with the [employer] and they say, [place] jurisdiction is okay as well.\"  Forms printed and placed in [facility] mailbox to be singed.   "

## 2025-01-23 ENCOUNTER — MYC MEDICAL ADVICE (OUTPATIENT)
Dept: OBGYN | Facility: CLINIC | Age: 30
End: 2025-01-23
Payer: COMMERCIAL

## 2025-01-23 NOTE — TELEPHONE ENCOUNTER
[facility] Call Center    Phone Message    May a detailed message be left on voicemail: yes     Reason for Call: Other: . Pts spouse Braydon will be coming to [facility] to  the forms shortly, FYI only, thank you!    Action Taken: Message routed to:  Other: obgyn    Travel Screening: Not Applicable     Date of Service:

## 2025-01-23 NOTE — TELEPHONE ENCOUNTER
Forms completed and signed - left a phone message to see hwat they would like me to do with forms-  it seems it needs to be completed by patient and signed.- will have it scanned into chart

## 2025-02-02 ENCOUNTER — MEDICAL CORRESPONDENCE (OUTPATIENT)
Dept: HEALTH INFORMATION MANAGEMENT | Facility: CLINIC | Age: 30
End: 2025-02-02
Payer: COMMERCIAL

## 2025-02-04 ENCOUNTER — MYC MEDICAL ADVICE (OUTPATIENT)
Dept: OBGYN | Facility: CLINIC | Age: 30
End: 2025-02-04
Payer: COMMERCIAL

## 2025-02-13 ENCOUNTER — LAB (OUTPATIENT)
Dept: LAB | Facility: CLINIC | Age: 30
End: 2025-02-13
Attending: ADVANCED PRACTICE MIDWIFE
Payer: COMMERCIAL

## 2025-02-13 ENCOUNTER — OFFICE VISIT (OUTPATIENT)
Dept: OBGYN | Facility: CLINIC | Age: 30
End: 2025-02-13
Attending: ADVANCED PRACTICE MIDWIFE
Payer: COMMERCIAL

## 2025-02-13 VITALS
DIASTOLIC BLOOD PRESSURE: 79 MMHG | HEART RATE: 89 BPM | WEIGHT: 175 LBS | HEIGHT: 64 IN | SYSTOLIC BLOOD PRESSURE: 114 MMHG | BODY MASS INDEX: 29.88 KG/M2

## 2025-02-13 DIAGNOSIS — E03.9 HYPOTHYROIDISM (ACQUIRED): ICD-10-CM

## 2025-02-13 DIAGNOSIS — Z12.4 SCREENING FOR MALIGNANT NEOPLASM OF CERVIX: ICD-10-CM

## 2025-02-13 DIAGNOSIS — Z01.419 ENCOUNTER FOR GYNECOLOGICAL EXAMINATION WITHOUT ABNORMAL FINDING: Primary | ICD-10-CM

## 2025-02-13 LAB
T4 FREE SERPL-MCNC: 1.29 NG/DL (ref 0.9–1.7)
TSH SERPL DL<=0.005 MIU/L-ACNC: 2.43 UIU/ML (ref 0.3–4.2)

## 2025-02-13 PROCEDURE — 84439 ASSAY OF FREE THYROXINE: CPT

## 2025-02-13 PROCEDURE — 84443 ASSAY THYROID STIM HORMONE: CPT

## 2025-02-13 PROCEDURE — 99213 OFFICE O/P EST LOW 20 MIN: CPT | Performed by: ADVANCED PRACTICE MIDWIFE

## 2025-02-13 PROCEDURE — 36415 COLL VENOUS BLD VENIPUNCTURE: CPT

## 2025-02-13 PROCEDURE — 84481 FREE ASSAY (FT-3): CPT

## 2025-02-13 PROCEDURE — G0145 SCR C/V CYTO,THINLAYER,RESCR: HCPCS | Performed by: ADVANCED PRACTICE MIDWIFE

## 2025-02-13 RX ORDER — VITAMIN A ACETATE, BETA CAROTENE, ASCORBIC ACID, CHOLECALCIFEROL, .ALPHA.-TOCOPHEROL ACETATE, DL-, THIAMINE MONONITRATE, RIBOFLAVIN, NIACINAMIDE, PYRIDOXINE HYDROCHLORIDE, FOLIC ACID, CYANOCOBALAMIN, CALCIUM CARBONATE, FERROUS FUMARATE, ZINC OXIDE, CUPRIC OXIDE 3080; 12; 120; 400; 1; 1.84; 3; 20; 22; 920; 25; 200; 27; 10; 2 [IU]/1; UG/1; MG/1; [IU]/1; MG/1; MG/1; MG/1; MG/1; MG/1; [IU]/1; MG/1; MG/1; MG/1; MG/1; MG/1
1 TABLET, FILM COATED ORAL DAILY
Qty: 90 TABLET | Refills: 2 | Status: SHIPPED | OUTPATIENT
Start: 2025-02-13

## 2025-02-13 RX ORDER — LEVOTHYROXINE SODIUM 50 UG/1
50 TABLET ORAL DAILY
Qty: 30 TABLET | Refills: 0 | Status: CANCELLED | OUTPATIENT
Start: 2025-02-13

## 2025-02-13 RX ORDER — CITALOPRAM HYDROBROMIDE 10 MG/1
10 TABLET ORAL DAILY
Qty: 30 TABLET | Refills: 0 | Status: CANCELLED | OUTPATIENT
Start: 2025-02-13

## 2025-02-13 RX ORDER — CITALOPRAM HYDROBROMIDE 10 MG/1
10 TABLET ORAL DAILY
Qty: 90 TABLET | Refills: 3 | Status: SHIPPED | OUTPATIENT
Start: 2025-02-13

## 2025-02-13 RX ORDER — VITAMIN A ACETATE, BETA CAROTENE, ASCORBIC ACID, CHOLECALCIFEROL, .ALPHA.-TOCOPHEROL ACETATE, DL-, THIAMINE MONONITRATE, RIBOFLAVIN, NIACINAMIDE, PYRIDOXINE HYDROCHLORIDE, FOLIC ACID, CYANOCOBALAMIN, CALCIUM CARBONATE, FERROUS FUMARATE, ZINC OXIDE, CUPRIC OXIDE 3080; 12; 120; 400; 1; 1.84; 3; 20; 22; 920; 25; 200; 27; 10; 2 [IU]/1; UG/1; MG/1; [IU]/1; MG/1; MG/1; MG/1; MG/1; MG/1; [IU]/1; MG/1; MG/1; MG/1; MG/1; MG/1
1 TABLET, FILM COATED ORAL DAILY
Qty: 90 TABLET | Refills: 2 | Status: CANCELLED | OUTPATIENT
Start: 2025-02-13

## 2025-02-13 ASSESSMENT — PAIN SCALES - GENERAL: PAINLEVEL_OUTOF10: NO PAIN (0)

## 2025-02-13 NOTE — PROGRESS NOTES
Progress Note    SUBJECTIVE:  Cathy Mace is an 29 year old  , who requests a pap smear and thyroid labs.        Concerns today include: pt is leaving in 2 weeks to [place] for 2 months, needs med refill before travel  Need to check thyroid levels today in case dose change is needed  Here w  and 2 children, including 4 mo baby who is doing well, thriving, breastfeeding  Mood is stable on celexa    Menstrual History:      3/2/2024     2:20 AM 2024     2:20 PM 2024     2:30 PM 2024     2:20 PM   Menstrual History   LAST MENSTRUAL PERIOD 2024         HISTORY:  Prescription Medications as of 2/15/2025         Rx Number Disp Refills Start End Last Dispensed Date Next Fill Date Owning Pharmacy    citalopram (CELEXA) 10 MG tablet  90 tablet 3 2025 --   [facility] #[id number] [place] [address] [address]    Sig: Take 1 tablet (10 mg) by mouth daily. Need to come to 24 appointment for continued refills.    Class: E-Prescribe    Route: Oral    levothyroxine (SYNTHROID/LEVOTHROID) 50 MCG tablet  90 tablet 3 2/15/2025 --   [facility] #[id number] [place] [address] [address]    Sig: Take 1 tablet (50 mcg) by mouth daily.    Class: E-Prescribe    Route: Oral    Lidocaine-Hydrocort, Perianal, 3-0.5 % CREA  28.3 g 1 2024 --   [facility] #[id number] - [place] [address] [address]    Sig: Externally apply 1 g topically twice a week.    Class: E-Prescribe    Route: Apply externally    Lidocaine-Hydrocortisone Ace 1-1 % CREA  30 g 2 2024 --   [facility] #[id number] - [facility] [address]    Sig: Externally apply 1 g topically twice a week.    Class: E-Prescribe    Route: Apply externally    Prenatal Vit-Fe Fumarate-FA (PRENATAL PLUS) 27-1 MG TABS   90 tablet 2 2025 --   [facility] #[id number] - [place], [place] - [address] [facility] AT [facility] [address]    Sig: Take 1 tablet by mouth daily.    Class: E-Prescribe    Route: Oral          No Known Allergies  Immunization History   Administered Date(s) Administered    RSV Vaccine (Abrysvo) 2024    TDAP (Adacel,Boostrix) 2024       OB History    Para Term  AB Living   2 2 0 2 0 2   SAB IAB Ectopic Multiple Live Births   0 0 0 0 2     Past Medical History:   Diagnosis Date    Hypothyroidism (acquired) 2024    Post partum depression 2024    was on Lexapro, weaned from 20 mg to 5 mg and ended     Rectal fissure 2024     Past Surgical History:   Procedure Laterality Date    APPENDECTOMY       SECTION N/A 10/21/2024    Procedure:  section;  Surgeon: Paulina Robertson MD;  Location:  L+D     Family History   Problem Relation Age of Onset    No Known Problems Mother     No Known Problems Father     No Known Problems Sister     No Known Problems Brother     No Known Problems Brother     No Known Problems Maternal Grandmother         unknown    No Known Problems Maternal Grandfather         unknown    No Known Problems Paternal Grandmother         unknown    No Known Problems Paternal Grandfather         unknown     Social History     Socioeconomic History    Marital status:      Spouse name: Braydon    Number of children: 1   Occupational History    Occupation: [employer]   Tobacco Use    Smoking status: Never    Smokeless tobacco: Never   Substance and Sexual Activity    Alcohol use: Never    Drug use: Never    Sexual activity: Yes     Partners: Male     Social Drivers of Health     Financial Resource Strain: Low Risk  (2024)    Financial Resource Strain     Within the past 12 months, have you or your family members you live with been unable to get utilities (heat, electricity) when it was really needed?: No   Food Insecurity:  "Low Risk  (11/8/2024)    Food Insecurity     Within the past 12 months, did you worry that your food would run out before you got money to buy more?: No     Within the past 12 months, did the food you bought just not last and you didn t have money to get more?: No   Transportation Needs: Low Risk  (11/8/2024)    Transportation Needs     Within the past 12 months, has lack of transportation kept you from medical appointments, getting your medicines, non-medical meetings or appointments, work, or from getting things that you need?: No   Interpersonal Safety: Low Risk  (11/8/2024)    Interpersonal Safety     Do you feel physically and emotionally safe where you currently live?: Yes     Within the past 12 months, have you been hit, slapped, kicked or otherwise physically hurt by someone?: No     Within the past 12 months, have you been humiliated or emotionally abused in other ways by your partner or ex-partner?: No   Housing Stability: Low Risk  (11/8/2024)    Housing Stability     Do you have housing? : Yes     Are you worried about losing your housing?: No   Recent Concern: Housing Stability - High Risk (9/5/2024)    Housing Stability     Do you have housing? : No     Are you worried about losing your housing?: No         EXAM: /79   Pulse 89   Ht 1.626 m (5' 4.02\")   Wt 79.4 kg (175 lb)   LMP  (Exact Date)   Breastfeeding Yes   BMI 30.02 kg/m      Blood pressure 114/79, pulse 89, height 1.626 m (5' 4.02\"), weight 79.4 kg (175 lb), currently breastfeeding. Body mass index is 30.02 kg/m .  General appearance: Pleasant female in no acute distress.       PELVIC EXAM:  EG/BUS: Normal genital architecture without lesions, erythema or abnormal secretions Bartholin's, Urethra, [place]'s normal   Urethral meatus: normal   Urethra: no masses, tenderness, or scarring   Vagina: moist, pink, rugae with creamy, white, and odorless  secretions  Cervix: Multiparous, and no lesions  Pap obtained      ASSESSMENT:  Encounter " Diagnoses   Name Primary?    Screening for malignant neoplasm of cervix     Encounter for gynecological examination without abnormal finding Yes    Postpartum depression     Hypothyroidism (acquired)         PLAN:   Orders Placed This Encounter   Procedures    Pap Smear Exam []    TSH    T4 free    T3 Free    Pap Screen Reflex to HPV if ASCUS - Recommended Age 25 - 29 Years     (Z01.419) Encounter for gynecological examination without abnormal finding  (primary encounter diagnosis)    Plan: Pap Screen Reflex to HPV if ASCUS - Recommended        Age 25 - 29 Years, Prenatal Vit-Fe Fumarate-FA         (PRENATAL PLUS) 27-1 MG TABS            (Z12.4) Screening for malignant neoplasm of cervix  Comment:  Plan: Pap Smear Exam [], Pap Screen Reflex to         HPV if ASCUS - Recommended Age 25 - 29 Years            (F53.0) Postpartum depression  Comment:   Plan: citalopram (CELEXA) 10 MG tablet            (E03.9) Hypothyroidism (acquired)  Comment:   Plan: TSH, T4 free, T3 Free, levothyroxine         (SYNTHROID/LEVOTHROID) 50 MCG tablet--continue same dose              Fall 2025 for preventive care or problems/concerns.     Verbalized understanding and agreement with visit plan.  20 minutes spent on the date of the encounter doing chart review, history and exam, documentation and further activities per the note.    Hafsa Choi, DNP, APRN, CNM, FACNM

## 2025-02-13 NOTE — LETTER
2025       RE: Cathy Mace  [address]  [place] [address]     Dear Colleague,    Thank you for referring your patient, Cathy Mace, to the [facility] [place] [facility]. Please see a copy of my visit note below.    Progress Note    SUBJECTIVE:  Cathy Mace is an 29 year old  , who requests a pap smear and thyroid labs.        Concerns today include: pt is leaving in 2 weeks to [place] for 2 months, needs med refill before travel  Need to check thyroid levels today in case dose change is needed  Here w  and 2 children, including 4 mo baby who is doing well, thriving, breastfeeding  Mood is stable on celexa    Menstrual History:      3/2/2024     2:20 AM 2024     2:20 PM 2024     2:30 PM 2024     2:20 PM   Menstrual History   LAST MENSTRUAL PERIOD 2024         HISTORY:  Prescription Medications as of 2/15/2025         Rx Number Disp Refills Start End Last Dispensed Date Next Fill Date Owning Pharmacy    citalopram (CELEXA) 10 MG tablet  90 tablet 3 2025 --   [facility] #[id number] [place] [address] [address]    Sig: Take 1 tablet (10 mg) by mouth daily. Need to come to 24 appointment for continued refills.    Class: E-Prescribe    Route: Oral    levothyroxine (SYNTHROID/LEVOTHROID) 50 MCG tablet  90 tablet 3 2/15/2025 --   [facility] #[id number] - [facility] [address]    Sig: Take 1 tablet (50 mcg) by mouth daily.    Class: E-Prescribe    Route: Oral    Lidocaine-Hydrocort, Perianal, 3-0.5 % CREA  28.3 g 1 2024 --   [facility] #[id number] - [place], [place] - [address]    Sig: Externally apply 1 g topically twice a week.    Class: E-Prescribe    Route: Apply externally     Lidocaine-Hydrocortisone Ace 1-1 % CREA  30 g 2 2024 --   [facility] #[id number] - [facility] [address]    Sig: Externally apply 1 g topically twice a week.    Class: E-Prescribe    Route: Apply externally    Prenatal Vit-Fe Fumarate-FA (PRENATAL PLUS) 27-1 MG TABS  90 tablet 2 2025 --   [facility] #[id number] - [facility] [address]    Sig: Take 1 tablet by mouth daily.    Class: E-Prescribe    Route: Oral          No Known Allergies  Immunization History   Administered Date(s) Administered     RSV Vaccine (Abrysvo) 2024     TDAP (Adacel,Boostrix) 2024       OB History    Para Term  AB Living   2 2 0 2 0 2   SAB IAB Ectopic Multiple Live Births   0 0 0 0 2     Past Medical History:   Diagnosis Date     Hypothyroidism (acquired) 2024     Post partum depression 2024    was on Lexapro, weaned from 20 mg to 5 mg and ended      Rectal fissure 2024     Past Surgical History:   Procedure Laterality Date     APPENDECTOMY        SECTION N/A 10/21/2024    Procedure:  section;  Surgeon: Paulina Robertson MD;  Location:  L+D     Family History   Problem Relation Age of Onset     No Known Problems Mother      No Known Problems Father      No Known Problems Sister      No Known Problems Brother      No Known Problems Brother      No Known Problems Maternal Grandmother         unknown     No Known Problems Maternal Grandfather         unknown     No Known Problems Paternal Grandmother         unknown     No Known Problems Paternal Grandfather         unknown     Social History     Socioeconomic History     Marital status:      Spouse name: Braydon     Number of children: 1   Occupational History     Occupation: [employer]   Tobacco Use     Smoking status: Never     Smokeless tobacco: Never   Substance and Sexual Activity     Alcohol use:  "Never     Drug use: Never     Sexual activity: Yes     Partners: Male     Social Drivers of Health     Financial Resource Strain: Low Risk  (11/8/2024)    Financial Resource Strain      Within the past 12 months, have you or your family members you live with been unable to get utilities (heat, electricity) when it was really needed?: No   Food Insecurity: Low Risk  (11/8/2024)    Food Insecurity      Within the past 12 months, did you worry that your food would run out before you got money to buy more?: No      Within the past 12 months, did the food you bought just not last and you didn t have money to get more?: No   Transportation Needs: Low Risk  (11/8/2024)    Transportation Needs      Within the past 12 months, has lack of transportation kept you from medical appointments, getting your medicines, non-medical meetings or appointments, work, or from getting things that you need?: No   Interpersonal Safety: Low Risk  (11/8/2024)    Interpersonal Safety      Do you feel physically and emotionally safe where you currently live?: Yes      Within the past 12 months, have you been hit, slapped, kicked or otherwise physically hurt by someone?: No      Within the past 12 months, have you been humiliated or emotionally abused in other ways by your partner or ex-partner?: No   Housing Stability: Low Risk  (11/8/2024)    Housing Stability      Do you have housing? : Yes      Are you worried about losing your housing?: No   Recent Concern: Housing Stability - High Risk (9/5/2024)    Housing Stability      Do you have housing? : No      Are you worried about losing your housing?: No         EXAM: /79   Pulse 89   Ht 1.626 m (5' 4.02\")   Wt 79.4 kg (175 lb)   LMP  (Exact Date)   Breastfeeding Yes   BMI 30.02 kg/m      Blood pressure 114/79, pulse 89, height 1.626 m (5' 4.02\"), weight 79.4 kg (175 lb), currently breastfeeding. Body mass index is 30.02 kg/m .  General appearance: Pleasant female in no acute " distress.       PELVIC EXAM:  EG/BUS: Normal genital architecture without lesions, erythema or abnormal secretions Bartholin's, Urethra, [place]'s normal   Urethral meatus: normal   Urethra: no masses, tenderness, or scarring   Vagina: moist, pink, rugae with creamy, white, and odorless  secretions  Cervix: Multiparous, and no lesions  Pap obtained      ASSESSMENT:  Encounter Diagnoses   Name Primary?     Screening for malignant neoplasm of cervix      Encounter for gynecological examination without abnormal finding Yes     Postpartum depression      Hypothyroidism (acquired)         PLAN:   Orders Placed This Encounter   Procedures     Pap Smear Exam []     TSH     T4 free     T3 Free     Pap Screen Reflex to HPV if ASCUS - Recommended Age 25 - 29 Years     (Z01.419) Encounter for gynecological examination without abnormal finding  (primary encounter diagnosis)    Plan: Pap Screen Reflex to HPV if ASCUS - Recommended        Age 25 - 29 Years, Prenatal Vit-Fe Fumarate-FA         (PRENATAL PLUS) 27-1 MG TABS            (Z12.4) Screening for malignant neoplasm of cervix  Comment:  Plan: Pap Smear Exam [], Pap Screen Reflex to         HPV if ASCUS - Recommended Age 25 - 29 Years            (F53.0) Postpartum depression  Comment:   Plan: citalopram (CELEXA) 10 MG tablet            (E03.9) Hypothyroidism (acquired)  Comment:   Plan: TSH, T4 free, T3 Free, levothyroxine         (SYNTHROID/LEVOTHROID) 50 MCG tablet--continue same dose              Fall 2025 for preventive care or problems/concerns.     Verbalized understanding and agreement with visit plan.  20 minutes spent on the date of the encounter doing chart review, history and exam, documentation and further activities per the note.    Hafsa Choi, DNP, APRN, CNM, FACNM        Again, thank you for allowing me to participate in the care of your patient.      Sincerely,    RADHA Deras CNM

## 2025-02-14 LAB — T3FREE SERPL-MCNC: 2.9 PG/ML (ref 2–4.4)

## 2025-02-15 RX ORDER — LEVOTHYROXINE SODIUM 50 UG/1
50 TABLET ORAL DAILY
Qty: 90 TABLET | Refills: 3 | Status: SHIPPED | OUTPATIENT
Start: 2025-02-15

## 2025-02-15 NOTE — PATIENT INSTRUCTIONS
Jad Morgan,    Given your stable thyroid levels, I sent the prescription to [facility] for the same dose.    Have fun on your travels and with your family!    Best,  Hafsa

## 2025-02-19 LAB
BKR LAB AP GYN ADEQUACY: NORMAL
BKR LAB AP GYN INTERPRETATION: NORMAL
BKR LAB AP HPV REFLEX: NORMAL
BKR LAB AP PREVIOUS ABNORMAL: NORMAL
PATH REPORT.COMMENTS IMP SPEC: NORMAL
PATH REPORT.COMMENTS IMP SPEC: NORMAL
PATH REPORT.RELEVANT HX SPEC: NORMAL

## 2025-05-30 ENCOUNTER — MEDICAL CORRESPONDENCE (OUTPATIENT)
Dept: HEALTH INFORMATION MANAGEMENT | Facility: CLINIC | Age: 30
End: 2025-05-30
Payer: COMMERCIAL

## 2025-07-12 SDOH — HEALTH STABILITY: PHYSICAL HEALTH: ON AVERAGE, HOW MANY MINUTES DO YOU ENGAGE IN EXERCISE AT THIS LEVEL?: 30 MIN

## 2025-07-12 SDOH — HEALTH STABILITY: PHYSICAL HEALTH: ON AVERAGE, HOW MANY DAYS PER WEEK DO YOU ENGAGE IN MODERATE TO STRENUOUS EXERCISE (LIKE A BRISK WALK)?: 6 DAYS

## 2025-07-12 ASSESSMENT — SOCIAL DETERMINANTS OF HEALTH (SDOH): HOW OFTEN DO YOU GET TOGETHER WITH FRIENDS OR RELATIVES?: ONCE A WEEK

## 2025-07-17 ENCOUNTER — OFFICE VISIT (OUTPATIENT)
Dept: FAMILY MEDICINE | Facility: CLINIC | Age: 30
End: 2025-07-17
Payer: COMMERCIAL

## 2025-07-17 VITALS
SYSTOLIC BLOOD PRESSURE: 111 MMHG | TEMPERATURE: 97.6 F | HEART RATE: 92 BPM | BODY MASS INDEX: 30.07 KG/M2 | DIASTOLIC BLOOD PRESSURE: 73 MMHG | WEIGHT: 187.08 LBS | HEIGHT: 66 IN | OXYGEN SATURATION: 98 %

## 2025-07-17 DIAGNOSIS — F41.9 ANXIETY AND DEPRESSION: ICD-10-CM

## 2025-07-17 DIAGNOSIS — R16.0 HEPATOMEGALY: ICD-10-CM

## 2025-07-17 DIAGNOSIS — E03.9 HYPOTHYROIDISM (ACQUIRED): ICD-10-CM

## 2025-07-17 DIAGNOSIS — F32.A ANXIETY AND DEPRESSION: ICD-10-CM

## 2025-07-17 DIAGNOSIS — R06.83 SNORING: ICD-10-CM

## 2025-07-17 DIAGNOSIS — Z00.00 ROUTINE GENERAL MEDICAL EXAMINATION AT A HEALTH CARE FACILITY: Primary | ICD-10-CM

## 2025-07-17 PROBLEM — N71.9 ENDOMETRITIS: Status: RESOLVED | Noted: 2024-11-07 | Resolved: 2025-07-17

## 2025-07-17 PROBLEM — O99.891 PAIN IN SYMPHYSIS PUBIS DURING PREGNANCY: Status: RESOLVED | Noted: 2024-03-20 | Resolved: 2025-07-17

## 2025-07-17 PROBLEM — O32.0XX0 UNSTABLE LIE OF FETUS: Status: RESOLVED | Noted: 2024-10-20 | Resolved: 2025-07-17

## 2025-07-17 PROBLEM — M79.18 PAIN IN SYMPHYSIS PUBIS DURING PREGNANCY: Status: RESOLVED | Noted: 2024-03-20 | Resolved: 2025-07-17

## 2025-07-17 PROBLEM — O32.9XX0 MALPRESENTATION BEFORE ONSET OF LABOR: Status: RESOLVED | Noted: 2024-10-21 | Resolved: 2025-07-17

## 2025-07-17 PROBLEM — O42.919 PRETERM PREMATURE RUPTURE OF MEMBRANES: Status: RESOLVED | Noted: 2024-10-21 | Resolved: 2025-07-17

## 2025-07-17 PROBLEM — Q27.0 SINGLE UMBILICAL ARTERY: Status: RESOLVED | Noted: 2024-06-29 | Resolved: 2025-07-17

## 2025-07-17 PROBLEM — Z34.81 ENCOUNTER FOR SUPERVISION OF OTHER NORMAL PREGNANCY IN FIRST TRIMESTER: Status: RESOLVED | Noted: 2024-04-25 | Resolved: 2025-07-17

## 2025-07-17 LAB
ERYTHROCYTE [DISTWIDTH] IN BLOOD BY AUTOMATED COUNT: 13.8 % (ref 10–15)
HCT VFR BLD AUTO: 40.5 % (ref 35–47)
HGB BLD-MCNC: 13 G/DL (ref 11.7–15.7)
INR PPP: 0.99 (ref 0.85–1.15)
MCH RBC QN AUTO: 27.9 PG (ref 26.5–33)
MCHC RBC AUTO-ENTMCNC: 32.1 G/DL (ref 31.5–36.5)
MCV RBC AUTO: 87 FL (ref 78–100)
PLATELET # BLD AUTO: 311 10E3/UL (ref 150–450)
PROTHROMBIN TIME: 13.4 SECONDS (ref 11.8–14.8)
RBC # BLD AUTO: 4.66 10E6/UL (ref 3.8–5.2)
WBC # BLD AUTO: 7.4 10E3/UL (ref 4–11)

## 2025-07-17 PROCEDURE — 85027 COMPLETE CBC AUTOMATED: CPT | Performed by: FAMILY MEDICINE

## 2025-07-17 PROCEDURE — 85610 PROTHROMBIN TIME: CPT | Performed by: FAMILY MEDICINE

## 2025-07-17 RX ORDER — HYDROXYZINE HYDROCHLORIDE 25 MG/1
25 TABLET, FILM COATED ORAL
Qty: 30 TABLET | Refills: 5 | Status: SHIPPED | OUTPATIENT
Start: 2025-07-17

## 2025-07-17 NOTE — PATIENT INSTRUCTIONS
Birth Control Options:   https://www.bedsider.org/birth-control    https://www.[facility].edu/files/webfm-uploads/documents/outreach/im/handout_sleep.pdf    Self-help workbooks for insomnia -      If you have found self-help books useful in the past for changing health-related habits, you may want to consider one of the following resources:     Say Lenny to Insomnia:The Six-Week, Drug-Free Program Developed At [facility].  Rudy Ray MD. Available in paperback, Napoleon and audiobook.       Overcoming Insomnia: A Cognitive-Behavioral Therapy Approach, Workbook.  Jacoby Shetty, PhD  and Patricia Steiner, PhD.  Available in paperback and Napoleon.       Quiet Your Mind and Get to Sleep: Solutions to Insomnia for Those with Depression, Anxiety, or Chronic Pain.  Karlee Goldberg, PhD and Patricia Steiner, PhD.  Available in paperback and Napoleon      Another resource -   You can also try [employer] online CBT for insomnia program called Go!ToSleep.  The program is in partnership with [facility].  The cost is $40 and you can sign up using the link   [email or web address]       Patient Education   Preventive Care Advice   This is general advice given by our system to help you stay healthy. However, your care team may have specific advice just for you. Please talk to your care team about your preventive care needs.  Nutrition  Eat 5 or more servings of fruits and vegetables each day.  Try wheat bread, brown rice and whole grain pasta (instead of white bread, rice, and pasta).  Get enough calcium and vitamin D. Check the label on foods and aim for 100% of the RDA (recommended daily allowance).  Lifestyle  Exercise at least 150 minutes each week  (30 minutes a day, 5 days a week).  Do muscle strengthening activities 2 days a week. These help control your weight and prevent disease.  No smoking.  Wear sunscreen to prevent skin cancer.  Have a dental exam and cleaning every 6  months.  Yearly exams  See your health care team every year to talk about:  Any changes in your health.  Any medicines your care team has prescribed.  Preventive care, family planning, and ways to prevent chronic diseases.  Shots (vaccines)   HPV shots (up to age 26), if you've never had them before.  Hepatitis B shots (up to age 59), if you've never had them before.  COVID-19 shot: Get this shot when it's due.  Flu shot: Get a flu shot every year.  Tetanus shot: Get a tetanus shot every 10 years.  Pneumococcal, hepatitis A, and RSV shots: Ask your care team if you need these based on your risk.  Shingles shot (for age 50 and up)  General health tests  Diabetes screening:  Starting at age 35, Get screened for diabetes at least every 3 years.  If you are younger than age 35, ask your care team if you should be screened for diabetes.  Cholesterol test: At age 39, start having a cholesterol test every 5 years, or more often if advised.  Bone density scan (DEXA): At age 50, ask your care team if you should have this scan for osteoporosis (brittle bones).  Hepatitis C: Get tested at least once in your life.  STIs (sexually transmitted infections)  Before age 24: Ask your care team if you should be screened for STIs.  After age 24: Get screened for STIs if you're at risk. You are at risk for STIs (including HIV) if:  You are sexually active with more than one person.  You don't use condoms every time.  You or a partner was diagnosed with a sexually transmitted infection.  If you are at risk for HIV, ask about PrEP medicine to prevent HIV.  Get tested for HIV at least once in your life, whether you are at risk for HIV or not.  Cancer screening tests  Cervical cancer screening: If you have a cervix, begin getting regular cervical cancer screening tests starting at age 21.  Breast cancer scan (mammogram): If you've ever had breasts, begin having regular mammograms starting at age 40. This is a scan to check for breast  cancer.  Colon cancer screening: It is important to start screening for colon cancer at age 45.  Have a colonoscopy test every 10 years (or more often if you're at risk) Or, ask your provider about stool tests like a FIT test every year or Cologuard test every 3 years.  To learn more about your testing options, visit:   .  For help making a decision, visit:   https://bit.ly/uj56950.  Prostate cancer screening test: If you have a prostate, ask your care team if a prostate cancer screening test (PSA) at age 55 is right for you.  Lung cancer screening: If you are a current or former smoker ages 50 to 80, ask your care team if ongoing lung cancer screenings are right for you.  For informational purposes only. Not to replace the advice of your health care provider. Copyright   2023 [employer]. All rights reserved. Clinically reviewed by the [facility] Transitions Program. [employer] [id number] - REV 01/24.  Learning About Stress  What is stress?     Stress is your body's response to a hard situation. Your body can have a physical, emotional, or mental response. Stress is a fact of life for most people, and it affects everyone differently. What causes stress for you may not be stressful for someone else.  A lot of things can cause stress. You may feel stress when you go on a job interview, take a test, or run a race. This kind of short-term stress is normal and even useful. It can help you if you need to work hard or react quickly. For example, stress can help you finish an important job on time.  Long-term stress is caused by ongoing stressful situations or events. Examples of long-term stress include long-term health problems, ongoing problems at work, or conflicts in your family. Long-term stress can harm your health.  How does stress affect your health?  When you are stressed, your body responds as though you are in danger. It makes hormones that speed up your heart, make you breathe faster, and give  you a burst of energy. This is called the fight-or-flight stress response. If the stress is over quickly, your body goes back to normal and no harm is done.  But if stress happens too often or lasts too long, it can have bad effects. Long-term stress can make you more likely to get sick, and it can make symptoms of some diseases worse. If you tense up when you are stressed, you may develop neck, shoulder, or low back pain. Stress is linked to high blood pressure and heart disease.  Stress also harms your emotional health. It can make you cardenas, tense, or depressed. Your relationships may suffer, and you may not do well at work or school.  What can you do to manage stress?  You can try these things to help manage stress:   Do something active. Exercise or activity can help reduce stress. Walking is a great way to get started. Even everyday activities such as housecleaning or yard work can help.  Try yoga or betsy chi. These techniques combine exercise and meditation. You may need some training at first to learn them.  Do something you enjoy. For example, listen to music or go to a movie. Practice your hobby or do volunteer work.  Meditate. This can help you relax, because you are not worrying about what happened before or what may happen in the future.  Do guided imagery. Imagine yourself in any setting that helps you feel calm. You can use online videos, books, or a teacher to guide you.  Do breathing exercises. For example:  From a standing position, bend forward from the waist with your knees slightly bent. Let your arms dangle close to the floor.  Breathe in slowly and deeply as you return to a standing position. Roll up slowly and lift your head last.  Hold your breath for just a few seconds in the standing position.  Breathe out slowly and bend forward from the waist.  Let your feelings out. Talk, laugh, cry, and express anger when you need to. Talking with supportive friends or family, a counselor, or a fanny  "leader about your feelings is a healthy way to relieve stress. Avoid discussing your feelings with people who make you feel worse.  Write. It may help to write about things that are bothering you. This helps you find out how much stress you feel and what is causing it. When you know this, you can find better ways to cope.  What can you do to prevent stress?  You might try some of these things to help prevent stress:  Manage your time. This helps you find time to do the things you want and need to do.  Get enough sleep. Your body recovers from the stresses of the day while you are sleeping.  Get support. Your family, friends, and community can make a difference in how you experience stress.  Limit your news feed. Avoid or limit time on social media or news that may make you feel stressed.  Do something active. Exercise or activity can help reduce stress. Walking is a great way to get started.  Where can you learn more?  Go to https://www.[employer].net/patiented  Enter N032 in the search box to learn more about \"Learning About Stress.\"  Current as of: October 24, 2024  Content Version: 14.5    3842-7076 [employer].   Care instructions adapted under license by your healthcare professional. If you have questions about a medical condition or this instruction, always ask your healthcare professional. [employer] disclaims any warranty or liability for your use of this information.       "

## 2025-07-17 NOTE — PROGRESS NOTES
Preventive Care Visit  [facility]  Aurora Andres MD, Family Medicine  Jul 17, 2025      Assessment & Plan     Routine general medical examination at a health care facility    Hypothyroidism (acquired)  The current medical regimen is effective;  continue present plan and medications.  - TSH with free T4 reflex; Future    Anxiety and depression  Insomnia   [facility] medicine handout on insomnia provided and reviewed with patient. Also discussed CBT-I. Trial of hydroxyzine prn, discussed breastfeeding limited data but likely low risk, and advised NOT to co-sleep if she takes hydroxyzine. If anxiety not controlled with addition of hydroxyzine prn, consider dose increase of citalopram.   - hydrOXYzine HCl (ATARAX) 25 MG tablet; Take 1 tablet (25 mg) by mouth nightly as needed for anxiety.    Snoring  Referral to sleep medicine for evaluation.   - Adult Sleep Eval & Management Referral; Future    Hepatomegaly  Noted incidentally on imaging. Check labs today, consider hepatitis serologies, iron studies, A1AT, and autoimmune markers if initial workup unrevealing.   - Comprehensive metabolic panel; Future  - CBC with platelets; Future  - INR; Future    Counseling  Appropriate preventive services were addressed with this patient via screening, questionnaire, or discussion as appropriate for fall prevention, nutrition, physical activity, Tobacco-use cessation, social engagement, weight loss and cognition.  Checklist reviewing preventive services available has been given to the patient.  Reviewed patient's diet, addressing concerns and/or questions.   She is at risk for psychosocial distress and has been provided with information to reduce risk.     Follow-up  Return in about 53 weeks (around 7/23/2026) for Annual Wellness Visit.    Keyon Morgan is a 30 year old, presenting for the following:  Physical (Headaches /Sleeping concern- Hard to fall and stay asleep /Enlarged liver concern, happened while giving  birth /Options for birth control )           HPI  Hypothyroidism. Long-standing diagnosis.   - Medication: Has been on stable dosing of levothyroxine 50 mcg daily.   - Most recent TSH reviewed today.   Lab Results   Component Value Date    TSH 2.43 02/13/2025     Anxiety and depression. Long-standing diagnosis.   - Medications: Has been treating with citalopram 10 mg daily. No adverse medication effects.   - Counseling: Patient is not currently seeing a therapist/counselor.   - Past medication trials: escitalopram.     Insomnia and headaches.   - For the last month.  - Baby is sleeping through the night for the most part but still Aiman has a hard time staying asleep through the night. She is co-sleeping with her 8 month old daughter.   - Will wake up with headaches that persist throughout the day. Top of her head. Sharp pain that comes and goes. Has tried Tylenol and Advil and it helps a little bit.   - Has not tried any treatment for insomnia.   - Menses have not resumed. She is still breastfeeding.   - Will wake up and stay awake for 3-4 hours.   - She does snore at night. No witnessed apneas.     Contraceptive counseling.   - Still breastfeeding, menses have not resumed.   - Previously on birth control pill and had irregular bleeding.     Hepatomegaly.   - Admitted with PP endometritis.   - Hepatomegaly noted on imaging. 18 cm.   - No alcohol use.              7/12/2025   General Health   How would you rate your overall physical health? (!) FAIR   Feel stress (tense, anxious, or unable to sleep) Rather much   (!) STRESS CONCERN      7/12/2025   Nutrition   Three or more servings of calcium each day? (!) NO   Diet: Regular (no restrictions)   How many servings of fruit and vegetables per day? (!) 0-1   How many sweetened beverages each day? 0-1         7/12/2025   Exercise   Days per week of moderate/strenous exercise 6 days   Average minutes spent exercising at this level 30 min         7/12/2025   Social  Factors   Frequency of gathering with friends or relatives Once a week   Worry food won't last until get money to buy more No   Food not last or not have enough money for food? No   Do you have housing? (Housing is defined as stable permanent housing and does not include staying outside in a car, in a tent, in an abandoned building, in an overnight shelter, or couch-surfing.) Yes   Are you worried about losing your housing? No   Lack of transportation? No   Unable to get utilities (heat,electricity)? No         7/12/2025   Dental   Dentist two times every year? Yes       PHQ-2 Score:       2/12/2025     4:40 PM   PHQ-2 ( 1999 [employer])   Q1: Little interest or pleasure in doing things 0   Q2: Feeling down, depressed or hopeless 1   PHQ-2 Score 1    Q1: Little interest or pleasure in doing things Not at all   Q2: Feeling down, depressed or hopeless Several days   PHQ-2 Score 1       Patient-reported           7/12/2025   Substance Use   Alcohol more than 3/day or more than 7/wk No   Do you use any other substances recreationally? No     Social History     Tobacco Use    Smoking status: Never    Smokeless tobacco: Never   Substance Use Topics    Alcohol use: Never    Drug use: Never          Mammogram Screening - Patient under 40 years of age: Routine Mammogram Screening not recommended.         7/12/2025   STI Screening   New sexual partner(s) since last STI/HIV test? No     History of abnormal Pap smear: No - age 30- 64 PAP with HPV every 5 years recommended        2/13/2025     3:31 PM   PAP / HPV   PAP Negative for Intraepithelial Lesion or Malignancy (NILM)            7/12/2025   Contraception/Family Planning   Questions about contraception or family planning (!) YES    What are your periods like? Not currently having periods        Reviewed and updated as needed this visit by Provider    Allergies   Problems                  Objective    Exam  /73   Pulse 92   Temp 97.6  F (36.4  C)   Ht 1.675 m (5'  "5.95\")   Wt 84.9 kg (187 lb 1.3 oz)   SpO2 98%   Breastfeeding Yes   BMI 30.25 kg/m         Physical Exam  GENERAL: alert and no distress  EYES: Eyes grossly normal to inspection, PERRL and conjunctivae and sclerae normal  HENT: ear canals and TM's normal, nose and mouth without ulcers or lesions  NECK: no adenopathy, no asymmetry, masses, or scars  RESP: lungs clear to auscultation - no rales, rhonchi or wheezes  CV: regular rate and rhythm, normal S1 S2, no S3 or S4, no murmur, click or rub, no peripheral edema  ABDOMEN: soft, nontender, no hepatosplenomegaly, no masses and bowel sounds normal  MS: no gross musculoskeletal defects noted, no edema  SKIN: no suspicious lesions or rashes  NEURO: Normal strength and tone, mentation intact and speech normal  PSYCH: mentation appears normal, affect normal/bright        Signed Electronically by: Aurora Andres MD    "

## 2025-07-21 ENCOUNTER — PATIENT OUTREACH (OUTPATIENT)
Dept: CARE COORDINATION | Facility: CLINIC | Age: 30
End: 2025-07-21
Payer: COMMERCIAL

## (undated) DEVICE — SU VICRYL 0 CT 36" J358H

## (undated) DEVICE — ESU GROUND PAD UNIVERSAL W/O CORD

## (undated) DEVICE — PACK C-SECTION LF PL15OTA83B

## (undated) DEVICE — SU CHROMIC 1 CTX 36" 905H

## (undated) DEVICE — CATH TRAY FOLEY 16FR BARDEX W/DRAIN BAG STATLOCK 300316A

## (undated) DEVICE — PREP CHLORAPREP 26ML TINTED HI-LITE ORANGE 930815

## (undated) DEVICE — GLOVE PROTEXIS W/NEU-THERA 7.0  2D73TE70

## (undated) DEVICE — BLADE CLIPPER 4406

## (undated) DEVICE — SOL NACL 0.9% IRRIG 1000ML BOTTLE 07138-09

## (undated) DEVICE — SUCTION CANISTER MEDIVAC LINER 3000ML W/LID 65651-530

## (undated) DEVICE — GLOVE PROTEXIS W/NEU-THERA 6.5  2D73TE65

## (undated) DEVICE — SU PLAIN 2-0 CT 27" 853H

## (undated) DEVICE — LINEN C-SECTION 5415

## (undated) RX ORDER — LIDOCAINE HYDROCHLORIDE 10 MG/ML
INJECTION, SOLUTION EPIDURAL; INFILTRATION; INTRACAUDAL; PERINEURAL
Status: DISPENSED
Start: 2024-05-20

## (undated) RX ORDER — DEXAMETHASONE SODIUM PHOSPHATE 4 MG/ML
INJECTION, SOLUTION INTRA-ARTICULAR; INTRALESIONAL; INTRAMUSCULAR; INTRAVENOUS; SOFT TISSUE
Status: DISPENSED
Start: 2024-07-29

## (undated) RX ORDER — LIDOCAINE HYDROCHLORIDE 10 MG/ML
INJECTION, SOLUTION EPIDURAL; INFILTRATION; INTRACAUDAL; PERINEURAL
Status: DISPENSED
Start: 2024-07-29

## (undated) RX ORDER — OXYTOCIN/0.9 % SODIUM CHLORIDE 30/500 ML
PLASTIC BAG, INJECTION (ML) INTRAVENOUS
Status: DISPENSED
Start: 2024-10-21

## (undated) RX ORDER — ONDANSETRON 2 MG/ML
INJECTION INTRAMUSCULAR; INTRAVENOUS
Status: DISPENSED
Start: 2024-10-21

## (undated) RX ORDER — DEXAMETHASONE SODIUM PHOSPHATE 4 MG/ML
INJECTION, SOLUTION INTRA-ARTICULAR; INTRALESIONAL; INTRAMUSCULAR; INTRAVENOUS; SOFT TISSUE
Status: DISPENSED
Start: 2024-05-20

## (undated) RX ORDER — MORPHINE SULFATE 1 MG/ML
INJECTION, SOLUTION EPIDURAL; INTRATHECAL; INTRAVENOUS
Status: DISPENSED
Start: 2024-10-21